# Patient Record
Sex: FEMALE | Race: WHITE | NOT HISPANIC OR LATINO | Employment: OTHER | URBAN - METROPOLITAN AREA
[De-identification: names, ages, dates, MRNs, and addresses within clinical notes are randomized per-mention and may not be internally consistent; named-entity substitution may affect disease eponyms.]

---

## 2018-02-07 ENCOUNTER — TELEPHONE (OUTPATIENT)
Dept: OBSTETRICS AND GYNECOLOGY | Facility: CLINIC | Age: 63
End: 2018-02-07

## 2018-02-07 DIAGNOSIS — R92.8 ABNORMAL MAMMOGRAM: Primary | ICD-10-CM

## 2018-02-07 NOTE — TELEPHONE ENCOUNTER
Tried to call patient to schedule her for the [facility] with Dr. Damon, left voicemail for patient to call back.

## 2018-02-07 NOTE — TELEPHONE ENCOUNTER
----- Message from Clara Puga MD sent at 2/7/2018 10:19 AM CST -----  Hi,     This patient is a former patient of Dr. Leigh and would like to see Dr. Damon at the [facility] for an annual exam. Please call to schedule.     Thanks!  Dr. Puga

## 2018-02-07 NOTE — TELEPHONE ENCOUNTER
Spoke with patient and notified her that annual exam is due 04/25/2018. Advised to call the office the beginning of April to schedule. She verbalized understanding

## 2018-02-07 NOTE — TELEPHONE ENCOUNTER
----- Message from Marielena Ramos MA sent at 2/7/2018  8:52 AM CST -----  Contact: patient      ----- Message -----  From: Rachid Sheridan  Sent: 2/6/2018   3:20 PM  To: Edd Elizabeth Staff    x_ 1st Request   _ 2nd Request   _ 3rd Request     Who: AKANKSHA MCINTYRE [[id number]]    Why: Former patient of Dr Bay is requesting a call back in reference to getting an U/S ordered at [facility].  Patient had a mammogram with findings and needs additional testing.  Patient did not want to schedule appointment in clinic.  Please call the patient and see how you can help her.    What Number to Call Back: [phone]    When to Expect a call back: (Before the end of the day)   -- if call after 3:00 call back will be tomorrow.

## 2018-02-15 ENCOUNTER — HOSPITAL ENCOUNTER (OUTPATIENT)
Dept: RADIOLOGY | Facility: HOSPITAL | Age: 63
Discharge: HOME | End: 2018-02-15
Attending: INTERNAL MEDICINE
Payer: COMMERCIAL

## 2018-02-15 VITALS — WEIGHT: 170 LBS | HEIGHT: 69 IN | BODY MASS INDEX: 25.18 KG/M2

## 2018-02-15 DIAGNOSIS — R92.8 ABNORMAL MAMMOGRAM: ICD-10-CM

## 2018-02-15 PROCEDURE — 76642 ULTRASOUND BREAST LIMITED: CPT | Mod: TC,PO,RT

## 2018-02-15 PROCEDURE — 76642 ULTRASOUND BREAST LIMITED: CPT | Mod: 26,RT,, | Performed by: RADIOLOGY

## 2018-03-20 ENCOUNTER — HOSPITAL ENCOUNTER (OUTPATIENT)
Dept: RADIOLOGY | Facility: HOSPITAL | Age: 63
Discharge: HOME | End: 2018-03-20
Payer: COMMERCIAL

## 2018-03-20 DIAGNOSIS — J40 BRONCHITIS, NOT SPECIFIED AS ACUTE OR CHRONIC: Primary | ICD-10-CM

## 2018-03-20 DIAGNOSIS — J40 BRONCHITIS, NOT SPECIFIED AS ACUTE OR CHRONIC: ICD-10-CM

## 2018-03-20 PROCEDURE — 71046 X-RAY EXAM CHEST 2 VIEWS: CPT | Mod: 26,,, | Performed by: RADIOLOGY

## 2018-03-20 PROCEDURE — 71046 X-RAY EXAM CHEST 2 VIEWS: CPT | Mod: TC

## 2018-05-08 ENCOUNTER — CLINICAL SUPPORT (OUTPATIENT)
Dept: SMOKING CESSATION | Facility: CLINIC | Age: 63
End: 2018-05-08
Payer: COMMERCIAL

## 2018-05-08 ENCOUNTER — TELEPHONE (OUTPATIENT)
Dept: SMOKING CESSATION | Facility: CLINIC | Age: 63
End: 2018-05-08

## 2018-05-08 VITALS
BODY MASS INDEX: 25.85 KG/M2 | DIASTOLIC BLOOD PRESSURE: 80 MMHG | HEART RATE: 89 BPM | WEIGHT: 175.06 LBS | SYSTOLIC BLOOD PRESSURE: 133 MMHG

## 2018-05-08 DIAGNOSIS — F17.210 NICOTINE DEPENDENCE, CIGARETTES, UNCOMPLICATED: Primary | ICD-10-CM

## 2018-05-08 PROCEDURE — 99404 PREV MED CNSL INDIV APPRX 60: CPT | Mod: S$GLB,,,

## 2018-05-08 RX ORDER — VARENICLINE TARTRATE 0.5 (11)-1
KIT ORAL
Qty: 1 PACKAGE | Refills: 0 | Status: SHIPPED | OUTPATIENT
Start: 2018-05-08 | End: 2018-07-23 | Stop reason: SINTOL

## 2018-05-08 RX ORDER — DM/P-EPHED/ACETAMINOPH/DOXYLAM 30-7.5/3
LIQUID (ML) ORAL
Qty: 168 LOZENGE | Refills: 0 | Status: SHIPPED | OUTPATIENT
Start: 2018-05-08 | End: 2019-03-11

## 2018-05-08 NOTE — PROGRESS NOTES
5/8/18    See Smoking Cessation Smart Form    Additional Interventions:  · Recommended patient participate in Smoking Cessation Group .  · Discussed triggers and planning for quit date.  · Given patient education handouts from [employer] Tool Kit #3  · Educated patient about and gave patient education handouts from  [employer] Drug Information on:  NRT, Wellbutrin, Chantix  · Provided phone number to reach Cessation Clinic CTTS (Certified Tobacco Treatment Specialist) for future assistance and numbers to 24/7 Quit lines.

## 2018-05-14 ENCOUNTER — CLINICAL SUPPORT (OUTPATIENT)
Dept: SMOKING CESSATION | Facility: CLINIC | Age: 63
End: 2018-05-14
Payer: COMMERCIAL

## 2018-05-14 DIAGNOSIS — F17.210 NICOTINE DEPENDENCE, CIGARETTES, UNCOMPLICATED: Primary | ICD-10-CM

## 2018-05-14 PROCEDURE — 90853 GROUP PSYCHOTHERAPY: CPT | Mod: S$GLB,,,

## 2018-05-14 NOTE — PROGRESS NOTES
Smoking Cessation Group Pre Group Session     Site: [facility]  Date:  5/14/18   Clinical Status of Patient: Outpatient   Length of Service and Code: 60 minutes - 80774   Number in Attendance: 3  Group Activities/Focus of Group:  Completion of TCRS (Tobacco Cessation Rating Scale) learned addiction model, personal reasons for quitting, medications, goals, quit date.  Specific session focus:  Personal views of necessary behaviors to get and stay quit, quit date planning, stages of readiness, behaviors and circumstances of previous quits, what seemed to work and what didnt, importance of assigning a planned quit date, mindfulness practices to assist with stress management and moving through urges without smoking.   Group Guidelines and orientation, withdrawal symptoms and side effects listed on the TCRS.   We viewed a [employer] news piece explaining mindfulness and the benefits.    Group practiced a mindful meditation on the breath and we processed this afterward.       Target symptoms:  withdrawal and medication side effects             The following were rated moderate (3) to severe (4) on TCRS:       Moderate 3: none     Severe 4:   none  Patient's Response to Intervention: Active participation, self-disclosure, supportive of group peers.  Progress Toward Goals and Other Mental Status Changes:  Patient shared smoking history and reasons for quitting with the group.  She has not received Chantix from [employer] yet.  Diagnosis:   F17.210  Plan: Group therapy, individual support/cessation counseling and medication monitoring by CTTS. Medication management by providers.  Return to Clinic: 1 week  Planned Quit Date:  Unknown

## 2018-06-13 ENCOUNTER — CLINICAL SUPPORT (OUTPATIENT)
Dept: SMOKING CESSATION | Facility: CLINIC | Age: 63
End: 2018-06-13
Payer: COMMERCIAL

## 2018-06-13 DIAGNOSIS — F17.210 NICOTINE DEPENDENCE, CIGARETTES, UNCOMPLICATED: Primary | ICD-10-CM

## 2018-06-13 PROCEDURE — 99407 BEHAV CHNG SMOKING > 10 MIN: CPT | Mod: S$GLB,,,

## 2018-06-20 ENCOUNTER — LAB VISIT (OUTPATIENT)
Dept: LAB | Facility: HOSPITAL | Age: 63
End: 2018-06-20
Attending: INTERNAL MEDICINE
Payer: COMMERCIAL

## 2018-06-20 ENCOUNTER — TELEPHONE (OUTPATIENT)
Dept: INTERNAL MEDICINE | Facility: CLINIC | Age: 63
End: 2018-06-20

## 2018-06-20 ENCOUNTER — OFFICE VISIT (OUTPATIENT)
Dept: INTERNAL MEDICINE | Facility: CLINIC | Age: 63
End: 2018-06-20
Payer: COMMERCIAL

## 2018-06-20 VITALS
HEART RATE: 87 BPM | TEMPERATURE: 98 F | BODY MASS INDEX: 25.87 KG/M2 | DIASTOLIC BLOOD PRESSURE: 70 MMHG | HEIGHT: 69 IN | WEIGHT: 174.69 LBS | SYSTOLIC BLOOD PRESSURE: 122 MMHG

## 2018-06-20 DIAGNOSIS — E78.5 HYPERLIPIDEMIA, UNSPECIFIED HYPERLIPIDEMIA TYPE: ICD-10-CM

## 2018-06-20 DIAGNOSIS — Z12.83 SKIN CANCER SCREENING: ICD-10-CM

## 2018-06-20 DIAGNOSIS — Z72.0 TOBACCO USE: ICD-10-CM

## 2018-06-20 DIAGNOSIS — Z12.11 COLON CANCER SCREENING: ICD-10-CM

## 2018-06-20 DIAGNOSIS — Z00.00 ANNUAL PHYSICAL EXAM: ICD-10-CM

## 2018-06-20 DIAGNOSIS — Z00.00 ANNUAL PHYSICAL EXAM: Primary | ICD-10-CM

## 2018-06-20 DIAGNOSIS — F41.9 ANXIETY: ICD-10-CM

## 2018-06-20 DIAGNOSIS — K21.9 GASTROESOPHAGEAL REFLUX DISEASE, ESOPHAGITIS PRESENCE NOT SPECIFIED: ICD-10-CM

## 2018-06-20 LAB
25(OH)D3+25(OH)D2 SERPL-MCNC: 40 NG/ML
ALBUMIN SERPL BCP-MCNC: 3.9 G/DL
ALP SERPL-CCNC: 53 U/L
ALT SERPL W/O P-5'-P-CCNC: 38 U/L
ANION GAP SERPL CALC-SCNC: 8 MMOL/L
AST SERPL-CCNC: 34 U/L
BASOPHILS # BLD AUTO: 0.02 K/UL
BASOPHILS NFR BLD: 0.3 %
BILIRUB SERPL-MCNC: 0.6 MG/DL
BUN SERPL-MCNC: 11 MG/DL
CALCIUM SERPL-MCNC: 9.5 MG/DL
CHLORIDE SERPL-SCNC: 103 MMOL/L
CHOLEST SERPL-MCNC: 214 MG/DL
CHOLEST/HDLC SERPL: 2.6 {RATIO}
CO2 SERPL-SCNC: 30 MMOL/L
CREAT SERPL-MCNC: 0.6 MG/DL
DIFFERENTIAL METHOD: ABNORMAL
EOSINOPHIL # BLD AUTO: 0.2 K/UL
EOSINOPHIL NFR BLD: 2.2 %
ERYTHROCYTE [DISTWIDTH] IN BLOOD BY AUTOMATED COUNT: 13.3 %
EST. GFR  (AFRICAN AMERICAN): >60 ML/MIN/1.73 M^2
EST. GFR  (NON AFRICAN AMERICAN): >60 ML/MIN/1.73 M^2
ESTIMATED AVG GLUCOSE: 100 MG/DL
GLUCOSE SERPL-MCNC: 116 MG/DL
HBA1C MFR BLD HPLC: 5.1 %
HCT VFR BLD AUTO: 45.1 %
HDLC SERPL-MCNC: 83 MG/DL
HDLC SERPL: 38.8 %
HGB BLD-MCNC: 15.1 G/DL
LDLC SERPL CALC-MCNC: 121.6 MG/DL
LYMPHOCYTES # BLD AUTO: 2.8 K/UL
LYMPHOCYTES NFR BLD: 41.2 %
MCH RBC QN AUTO: 31.8 PG
MCHC RBC AUTO-ENTMCNC: 33.5 G/DL
MCV RBC AUTO: 95 FL
MONOCYTES # BLD AUTO: 0.7 K/UL
MONOCYTES NFR BLD: 9.8 %
NEUTROPHILS # BLD AUTO: 3.2 K/UL
NEUTROPHILS NFR BLD: 46.4 %
NONHDLC SERPL-MCNC: 131 MG/DL
PLATELET # BLD AUTO: 260 K/UL
PMV BLD AUTO: 9.5 FL
POTASSIUM SERPL-SCNC: 4.2 MMOL/L
PROT SERPL-MCNC: 7.1 G/DL
RBC # BLD AUTO: 4.75 M/UL
SODIUM SERPL-SCNC: 141 MMOL/L
TRIGL SERPL-MCNC: 47 MG/DL
TSH SERPL DL<=0.005 MIU/L-ACNC: 0.95 UIU/ML
WBC # BLD AUTO: 6.85 K/UL

## 2018-06-20 PROCEDURE — 80053 COMPREHEN METABOLIC PANEL: CPT

## 2018-06-20 PROCEDURE — 99386 PREV VISIT NEW AGE 40-64: CPT | Mod: S$GLB,,, | Performed by: INTERNAL MEDICINE

## 2018-06-20 PROCEDURE — 82306 VITAMIN D 25 HYDROXY: CPT

## 2018-06-20 PROCEDURE — 84443 ASSAY THYROID STIM HORMONE: CPT

## 2018-06-20 PROCEDURE — 80061 LIPID PANEL: CPT

## 2018-06-20 PROCEDURE — 85025 COMPLETE CBC W/AUTO DIFF WBC: CPT

## 2018-06-20 PROCEDURE — 83036 HEMOGLOBIN GLYCOSYLATED A1C: CPT

## 2018-06-20 PROCEDURE — 36415 COLL VENOUS BLD VENIPUNCTURE: CPT

## 2018-06-20 PROCEDURE — 99999 PR PBB SHADOW E&M-EST. PATIENT-LVL III: CPT | Mod: PBBFAC,,, | Performed by: INTERNAL MEDICINE

## 2018-06-20 RX ORDER — ASCORBIC ACID 1000 MG
175 TABLET ORAL DAILY
COMMUNITY
End: 2023-03-14

## 2018-06-20 NOTE — TELEPHONE ENCOUNTER
Spoke with pt, advised of results. Pt is still taking pravastatin 80mg and fish oil daily. Pt would like to know if an order can be placed for a colonoscopy    Thanks

## 2018-06-21 ENCOUNTER — TELEPHONE (OUTPATIENT)
Dept: ENDOSCOPY | Facility: HOSPITAL | Age: 63
End: 2018-06-21

## 2018-06-21 NOTE — TELEPHONE ENCOUNTER
Contacted Pt to schedule colonoscopy,Pt not ready to schedule at this time, Pt to call back to schedule, number provided [phone].

## 2018-06-28 ENCOUNTER — CLINICAL SUPPORT (OUTPATIENT)
Dept: SMOKING CESSATION | Facility: CLINIC | Age: 63
End: 2018-06-28
Payer: COMMERCIAL

## 2018-06-28 DIAGNOSIS — F17.210 NICOTINE DEPENDENCE, CIGARETTES, UNCOMPLICATED: Primary | ICD-10-CM

## 2018-06-28 PROCEDURE — 99402 PREV MED CNSL INDIV APPRX 30: CPT | Mod: S$GLB,,,

## 2018-06-28 NOTE — PROGRESS NOTES
Individual Follow-Up Form    6/28/2018    Quit Date:   Has not quit yet.    Clinical Status of Patient: Outpatient    Length of Service: 30 minutes    Continuing Medication:   Chantix & nicotine lozenges    Target Symptoms: Withdrawal and medication side effects. The following were  rated moderate (3) to severe (4) on TCRS:  · Moderate (3): none  · Severe (4): none    Comments:   Patient is still smoking 1 pack of cigarettes/day.  She had to put things on hold for a while because of other pressing responsibilities.  She started the Chantix about 2 days ago.  No problems or side effects.  I reviewed with her how to take the Chantix correctly to avoid side effects and reminded her to call me if she has any problems. We talked at length about making a plan prior to her planned quit day so she could set up the process to be as successful as possible.  We went over the planned quit date checklist together.   We also talked about her supports and teaching them how to be supportive of her in a way that was helpful, ie not policing.  I gave her a handout she could share with her family about this.    Diagnosis: F17.210    Next Visit:   I will call patient in two weeks.  It is difficult for her to come in for a Clinic appointment because she is helping her mother-in-law settle in after a recent move and helps take care of her grandchildren.

## 2018-07-23 ENCOUNTER — CLINICAL SUPPORT (OUTPATIENT)
Dept: SMOKING CESSATION | Facility: CLINIC | Age: 63
End: 2018-07-23
Payer: COMMERCIAL

## 2018-07-23 DIAGNOSIS — F17.210 NICOTINE DEPENDENCE, CIGARETTES, UNCOMPLICATED: Primary | ICD-10-CM

## 2018-07-23 PROCEDURE — 99407 BEHAV CHNG SMOKING > 10 MIN: CPT | Mod: S$GLB,,,

## 2018-07-23 RX ORDER — IBUPROFEN 200 MG
1 TABLET ORAL DAILY
Qty: 28 PATCH | Refills: 0 | Status: SHIPPED | OUTPATIENT
Start: 2018-07-23 | End: 2019-03-11

## 2018-08-03 ENCOUNTER — CLINICAL SUPPORT (OUTPATIENT)
Dept: SMOKING CESSATION | Facility: CLINIC | Age: 63
End: 2018-08-03
Payer: COMMERCIAL

## 2018-08-03 DIAGNOSIS — F17.200 NICOTINE DEPENDENCE: Primary | ICD-10-CM

## 2018-08-03 PROCEDURE — 99407 BEHAV CHNG SMOKING > 10 MIN: CPT | Mod: S$GLB,,,

## 2018-08-03 NOTE — PROGRESS NOTES
Successful contact with patient regarding tobacco cessation quit #1. Pt states, she continue to work on quitting, she currently smoke about 10 cigarettes per day, and she is not ready to schedule a follow up appointment at this time.  Pt inform of her current benefit status, contact information to schedule an appointment when she is ready, and her 6 month telephone follow up. Tobacco cessation smart form completed for 3 month.

## 2018-08-14 ENCOUNTER — TELEPHONE (OUTPATIENT)
Dept: SMOKING CESSATION | Facility: CLINIC | Age: 63
End: 2018-08-14

## 2018-08-14 NOTE — TELEPHONE ENCOUNTER
8/14/18   10:15 a.m.      Telephone call to patient to follow up on progress quitting smoking.  Left voice mail #1 for return call.

## 2018-10-11 ENCOUNTER — INITIAL CONSULT (OUTPATIENT)
Dept: DERMATOLOGY | Facility: CLINIC | Age: 63
End: 2018-10-11
Payer: COMMERCIAL

## 2018-10-11 VITALS — BODY MASS INDEX: 25.7 KG/M2 | WEIGHT: 174 LBS

## 2018-10-11 DIAGNOSIS — L81.4 LENTIGINES: ICD-10-CM

## 2018-10-11 DIAGNOSIS — L30.1 DYSHIDROSIS: ICD-10-CM

## 2018-10-11 DIAGNOSIS — D22.9 NEVUS OF MULTIPLE SITES: ICD-10-CM

## 2018-10-11 DIAGNOSIS — L82.1 SEBORRHEIC KERATOSES: Primary | ICD-10-CM

## 2018-10-11 PROCEDURE — 99999 PR PBB SHADOW E&M-EST. PATIENT-LVL III: CPT | Mod: PBBFAC,,, | Performed by: DERMATOLOGY

## 2018-10-11 PROCEDURE — 3008F BODY MASS INDEX DOCD: CPT | Mod: CPTII,S$GLB,, | Performed by: DERMATOLOGY

## 2018-10-11 PROCEDURE — 99202 OFFICE O/P NEW SF 15 MIN: CPT | Mod: S$GLB,,, | Performed by: DERMATOLOGY

## 2018-10-11 NOTE — PROGRESS NOTES
Subjective:       Patient ID:  Karina Thomas is a 63 y.o. female who presents for   Chief Complaint   Patient presents with    Skin Discoloration     right back     Dry Skin     scalp    Skin Check     History of Present Illness: The patient presents with chief complaint of spots  Location: back  Duration: years  Signs/Symptoms: none    Prior treatments: none  Also rash on right foot for years previously treated per Dr [facility] tested not tinea, tried multiple topicals always recurs.         Review of Systems   Constitutional: Negative for fever.   Skin: Positive for rash. Negative for itching.   Hematologic/Lymphatic: Does not bruise/bleed easily.        Objective:    Physical Exam   Skin:   Areas Examined (abnormalities noted in diagram):   Scalp / Hair Palpated and Inspected  Head / Face Inspection Performed  Neck Inspection Performed  Chest / Axilla Inspection Performed  Back Inspection Performed  RUE Inspected  LUE Inspection Performed  RLE Inspected                       Diagram Legend     Erythematous scaling macule/papule c/w actinic keratosis       Vascular papule c/w angioma      Pigmented verrucoid papule/plaque c/w seborrheic keratosis      Yellow umbilicated papule c/w sebaceous hyperplasia      Irregularly shaped tan macule c/w lentigo     1-2 mm smooth white papules consistent with Milia      Movable subcutaneous cyst with punctum c/w epidermal inclusion cyst      Subcutaneous movable cyst c/w pilar cyst      Firm pink to brown papule c/w dermatofibroma      Pedunculated fleshy papule(s) c/w skin tag(s)      Evenly pigmented macule c/w junctional nevus     Mildly variegated pigmented, slightly irregular-bordered macule c/w mildly atypical nevus      Flesh colored to evenly pigmented papule c/w intradermal nevus       Pink pearly papule/plaque c/w basal cell carcinoma      Erythematous hyperkeratotic cursted plaque c/w SCC      Surgical scar with no sign of skin cancer recurrence      Open and  "closed comedones      Inflammatory papules and pustules      Verrucoid papule consistent consistent with wart     Erythematous eczematous patches and plaques     Dystrophic onycholytic nail with subungual debris c/w onychomycosis     Umbilicated papule    Erythematous-base heme-crusted tan verrucoid plaque consistent with inflamed seborrheic keratosis     Erythematous Silvery Scaling Plaque c/w Psoriasis     See annotation      Assessment / Plan:        Seborrheic keratoses  Brochure provided  reassurance      Dyshidrosis right foot  hibiclens weekly  Zeasorb powder  cerave psoriasis cream    Lentigines  The "ABCD" rules to observe pigmented lesions were reviewed.      Nevus of multiple sites  The "ABCD" rules to observe pigmented lesions were reviewed.               Follow-up in about 1 year (around 10/11/2019).  "

## 2018-10-11 NOTE — LETTER
October 11, 2018      Pinky Reyes MD  [address]  [facility] [address]           [place] [facility]  [address]  [place] [address]  Phone: [phone]  Fax: [phone]          Patient: Karina Thomas   MR Number: [id number]   YOB: 1955   Date of Visit: 10/11/2018       Dear Dr. Pinky Reyes:    Thank you for referring Karina Thomas to me for evaluation. Attached you will find relevant portions of my assessment and plan of care.    If you have questions, please do not hesitate to call me. I look forward to following Karina Thomas along with you.    Sincerely,    Wendy Shelley MD    Enclosure  CC:  No Recipients    If you would like to receive this communication electronically, please contact [email or web address] or [phone] to request more information on [email or web address] Link access.    For providers and/or their staff who would like to refer a patient to [facility], please contact us through our one-stop-shop provider referral line, [facility], at [phone].    If you feel you have received this communication in error or would no longer like to receive these types of communications, please e-mail [email or web address]

## 2018-11-05 ENCOUNTER — TELEPHONE (OUTPATIENT)
Dept: SMOKING CESSATION | Facility: CLINIC | Age: 63
End: 2018-11-05

## 2018-11-15 ENCOUNTER — OFFICE VISIT (OUTPATIENT)
Dept: URGENT CARE | Facility: CLINIC | Age: 63
End: 2018-11-15
Payer: COMMERCIAL

## 2018-11-15 VITALS
WEIGHT: 174 LBS | SYSTOLIC BLOOD PRESSURE: 131 MMHG | HEART RATE: 105 BPM | OXYGEN SATURATION: 96 % | DIASTOLIC BLOOD PRESSURE: 81 MMHG | TEMPERATURE: 97 F | HEIGHT: 69 IN | BODY MASS INDEX: 25.77 KG/M2

## 2018-11-15 DIAGNOSIS — J98.01 ACUTE BRONCHOSPASM: Primary | ICD-10-CM

## 2018-11-15 PROCEDURE — 99214 OFFICE O/P EST MOD 30 MIN: CPT | Mod: 25,S$GLB,, | Performed by: INTERNAL MEDICINE

## 2018-11-15 PROCEDURE — 94640 AIRWAY INHALATION TREATMENT: CPT | Mod: 59,S$GLB,, | Performed by: INTERNAL MEDICINE

## 2018-11-15 PROCEDURE — 3008F BODY MASS INDEX DOCD: CPT | Mod: CPTII,S$GLB,, | Performed by: INTERNAL MEDICINE

## 2018-11-15 PROCEDURE — 96372 THER/PROPH/DIAG INJ SC/IM: CPT | Mod: 59,S$GLB,, | Performed by: INTERNAL MEDICINE

## 2018-11-15 RX ORDER — METHYLPREDNISOLONE 4 MG/1
TABLET ORAL
Qty: 21 TABLET | Refills: 0 | Status: SHIPPED | OUTPATIENT
Start: 2018-11-16 | End: 2018-11-19

## 2018-11-15 RX ORDER — IPRATROPIUM BROMIDE 0.5 MG/2.5ML
0.5 SOLUTION RESPIRATORY (INHALATION)
Status: COMPLETED | OUTPATIENT
Start: 2018-11-15 | End: 2018-11-15

## 2018-11-15 RX ORDER — BETAMETHASONE SODIUM PHOSPHATE AND BETAMETHASONE ACETATE 3; 3 MG/ML; MG/ML
9 INJECTION, SUSPENSION INTRA-ARTICULAR; INTRALESIONAL; INTRAMUSCULAR; SOFT TISSUE ONCE
Status: COMPLETED | OUTPATIENT
Start: 2018-11-15 | End: 2018-11-15

## 2018-11-15 RX ORDER — ALBUTEROL SULFATE 0.83 MG/ML
2.5 SOLUTION RESPIRATORY (INHALATION)
Status: COMPLETED | OUTPATIENT
Start: 2018-11-15 | End: 2018-11-15

## 2018-11-15 RX ORDER — METHYLPREDNISOLONE 4 MG/1
TABLET ORAL
Qty: 21 TABLET | Refills: 0 | Status: SHIPPED | OUTPATIENT
Start: 2018-11-16 | End: 2018-11-15 | Stop reason: SDUPTHER

## 2018-11-15 RX ADMIN — BETAMETHASONE SODIUM PHOSPHATE AND BETAMETHASONE ACETATE 9 MG: 3; 3 INJECTION, SUSPENSION INTRA-ARTICULAR; INTRALESIONAL; INTRAMUSCULAR; SOFT TISSUE at 01:11

## 2018-11-15 RX ADMIN — IPRATROPIUM BROMIDE 0.5 MG: 0.5 SOLUTION RESPIRATORY (INHALATION) at 01:11

## 2018-11-15 RX ADMIN — ALBUTEROL SULFATE 2.5 MG: 0.83 SOLUTION RESPIRATORY (INHALATION) at 01:11

## 2018-11-15 NOTE — PROGRESS NOTES
"Subjective:       Patient ID: Karina Thomas is a 63 y.o. female.    Vitals:  height is 5' 9" (1.753 m) and weight is 78.9 kg (174 lb). Her oral temperature is 97.2 °F (36.2 °C). Her blood pressure is 131/81 and her pulse is 105. Her oxygen saturation is 96%.     Chief Complaint: URI    URI    This is a new problem. Episode onset: Sunday. The problem has been gradually worsening. There has been no fever. Associated symptoms include congestion, coughing, rhinorrhea and sneezing. Pertinent negatives include no ear pain, nausea, rash, sinus pain, vomiting or wheezing. She has tried decongestant for the symptoms. The treatment provided mild relief.       Constitution: Positive for chills and fatigue. Negative for sweating and fever.   HENT: Positive for congestion. Negative for ear pain, sinus pain, sinus pressure and voice change.    Neck: Negative for painful lymph nodes.   Eyes: Negative for eye redness.   Respiratory: Positive for chest tightness, cough and sputum production. Negative for bloody sputum, COPD, shortness of breath, stridor, wheezing and asthma.    Gastrointestinal: Negative for nausea and vomiting.   Musculoskeletal: Negative for muscle ache.   Skin: Negative for rash.   Allergic/Immunologic: Positive for sneezing. Negative for seasonal allergies and asthma.   Hematologic/Lymphatic: Negative for swollen lymph nodes.       Objective:      Physical Exam    Assessment:       1. Acute bronchospasm        Plan:         Acute bronchospasm  -     betamethasone acetate-betamethasone sodium phosphate injection 9 mg  -     ipratropium 0.02 % nebulizer solution 0.5 mg  -     albuterol nebulizer solution 2.5 mg    Other orders  -     Discontinue: methylPREDNISolone (MEDROL DOSEPACK) 4 mg tablet; use as directed  Dispense: 21 tablet; Refill: 0  -     methylPREDNISolone (MEDROL DOSEPACK) 4 mg tablet; use as directed  Dispense: 21 tablet; Refill: 0         "

## 2018-11-19 ENCOUNTER — OFFICE VISIT (OUTPATIENT)
Dept: URGENT CARE | Facility: CLINIC | Age: 63
End: 2018-11-19
Payer: COMMERCIAL

## 2018-11-19 VITALS
TEMPERATURE: 97 F | DIASTOLIC BLOOD PRESSURE: 83 MMHG | RESPIRATION RATE: 18 BRPM | HEIGHT: 69 IN | BODY MASS INDEX: 25.77 KG/M2 | WEIGHT: 174 LBS | HEART RATE: 87 BPM | OXYGEN SATURATION: 97 % | SYSTOLIC BLOOD PRESSURE: 136 MMHG

## 2018-11-19 DIAGNOSIS — F17.200 SMOKER: ICD-10-CM

## 2018-11-19 DIAGNOSIS — R09.1 PLEURISY: ICD-10-CM

## 2018-11-19 DIAGNOSIS — R05.9 COUGH: Primary | ICD-10-CM

## 2018-11-19 DIAGNOSIS — J98.01 ACUTE BRONCHOSPASM: ICD-10-CM

## 2018-11-19 DIAGNOSIS — J20.9 ACUTE PURULENT BRONCHITIS: ICD-10-CM

## 2018-11-19 PROCEDURE — 3008F BODY MASS INDEX DOCD: CPT | Mod: CPTII,S$GLB,, | Performed by: INTERNAL MEDICINE

## 2018-11-19 PROCEDURE — 71046 X-RAY EXAM CHEST 2 VIEWS: CPT | Mod: FY,S$GLB,, | Performed by: RADIOLOGY

## 2018-11-19 PROCEDURE — 99214 OFFICE O/P EST MOD 30 MIN: CPT | Mod: S$GLB,,, | Performed by: INTERNAL MEDICINE

## 2018-11-19 RX ORDER — ALBUTEROL SULFATE 90 UG/1
2 AEROSOL, METERED RESPIRATORY (INHALATION) EVERY 4 HOURS PRN
Qty: 1 INHALER | Refills: 1 | Status: SHIPPED | OUTPATIENT
Start: 2018-11-19 | End: 2018-11-19

## 2018-11-19 RX ORDER — LEVOFLOXACIN 500 MG/1
500 TABLET, FILM COATED ORAL DAILY
Qty: 10 TABLET | Refills: 0 | Status: SHIPPED | OUTPATIENT
Start: 2018-11-19 | End: 2018-11-29

## 2018-11-19 RX ORDER — METHYLPREDNISOLONE 4 MG/1
TABLET ORAL
Qty: 1 PACKAGE | Refills: 0 | Status: SHIPPED | OUTPATIENT
Start: 2018-11-19 | End: 2019-03-11

## 2018-11-19 RX ORDER — LEVOFLOXACIN 500 MG/1
500 TABLET, FILM COATED ORAL DAILY
Qty: 10 TABLET | Refills: 0 | Status: SHIPPED | OUTPATIENT
Start: 2018-11-19 | End: 2018-11-19

## 2018-11-19 RX ORDER — ALBUTEROL SULFATE 90 UG/1
2 AEROSOL, METERED RESPIRATORY (INHALATION) EVERY 4 HOURS PRN
Qty: 1 INHALER | Refills: 1 | Status: SHIPPED | OUTPATIENT
Start: 2018-11-19 | End: 2019-03-11 | Stop reason: ALTCHOICE

## 2018-11-19 RX ORDER — METHYLPREDNISOLONE 4 MG/1
TABLET ORAL
Qty: 1 PACKAGE | Refills: 0 | Status: SHIPPED | OUTPATIENT
Start: 2018-11-19 | End: 2018-11-19

## 2018-11-19 RX ORDER — CODEINE PHOSPHATE AND GUAIFENESIN 10; 100 MG/5ML; MG/5ML
5 SOLUTION ORAL EVERY 4 HOURS PRN
Qty: 118 ML | Refills: 0 | Status: SHIPPED | OUTPATIENT
Start: 2018-11-19 | End: 2018-11-29

## 2018-11-19 NOTE — PROGRESS NOTES
"Subjective:       Patient ID: Karina Thomas is a 63 y.o. female.    Vitals:  height is 5' 9" (1.753 m) and weight is 78.9 kg (174 lb). Her temperature is 97.1 °F (36.2 °C). Her blood pressure is 136/83 and her pulse is 87. Her respiration is 18 and oxygen saturation is 97%.     Chief Complaint: URI    Pt seen here on Thursday, thinks she is getting better but thinks may there is more that can be done      URI    This is a new problem. The current episode started in the past 7 days. The problem has been unchanged. Associated symptoms include coughing and wheezing. Pertinent negatives include no congestion, ear pain, nausea, rash, sinus pain or vomiting.       Constitution: Negative for chills, sweating, fatigue and fever.   HENT: Negative for ear pain, congestion, sinus pain, sinus pressure and voice change.    Neck: Negative for painful lymph nodes.   Eyes: Negative for eye redness.   Respiratory: Positive for cough and wheezing. Negative for chest tightness, sputum production, bloody sputum, COPD, shortness of breath, stridor and asthma.    Gastrointestinal: Negative for nausea and vomiting.   Musculoskeletal: Negative for muscle ache.   Skin: Negative for rash.   Allergic/Immunologic: Negative for seasonal allergies and asthma.   Hematologic/Lymphatic: Negative for swollen lymph nodes.       Objective:      Physical Exam   Constitutional: She appears well-developed and well-nourished.   HENT:   Head: Normocephalic and atraumatic.   Eyes: Conjunctivae and EOM are normal. Pupils are equal, round, and reactive to light.   Neck: Normal range of motion. Neck supple.   Cardiovascular: Normal rate and regular rhythm.   Pulmonary/Chest: She has wheezes.   Diffuse ronchi and upper airway congestion   Nursing note and vitals reviewed.      Assessment:       1. Cough    2. Pleurisy    3. Smoker    4. Acute bronchospasm    5. Acute purulent bronchitis        Plan:         Cough  -     XR CHEST PA AND LATERAL; Future; Expected " date: 11/19/2018    Pleurisy  -     XR CHEST PA AND LATERAL; Future; Expected date: 11/19/2018    Smoker  -     Ambulatory referral to Smoking Cessation Program    Acute bronchospasm  -     Discontinue: methylPREDNISolone (MEDROL DOSEPACK) 4 mg tablet; use as directed  Dispense: 1 Package; Refill: 0  -     Discontinue: albuterol (PROVENTIL/VENTOLIN HFA) 90 mcg/actuation inhaler; Inhale 2 puffs into the lungs every 4 (four) hours as needed for Wheezing. Rescue  Dispense: 1 Inhaler; Refill: 1  -     Discontinue: levoFLOXacin (LEVAQUIN) 500 MG tablet; Take 1 tablet (500 mg total) by mouth once daily. for 10 days  Dispense: 10 tablet; Refill: 0    Acute purulent bronchitis  -     Discontinue: methylPREDNISolone (MEDROL DOSEPACK) 4 mg tablet; use as directed  Dispense: 1 Package; Refill: 0  -     Discontinue: albuterol (PROVENTIL/VENTOLIN HFA) 90 mcg/actuation inhaler; Inhale 2 puffs into the lungs every 4 (four) hours as needed for Wheezing. Rescue  Dispense: 1 Inhaler; Refill: 1  -     Discontinue: levoFLOXacin (LEVAQUIN) 500 MG tablet; Take 1 tablet (500 mg total) by mouth once daily. for 10 days  Dispense: 10 tablet; Refill: 0    Other orders  -     albuterol (PROVENTIL/VENTOLIN HFA) 90 mcg/actuation inhaler; Inhale 2 puffs into the lungs every 4 (four) hours as needed for Wheezing. Rescue  Dispense: 1 Inhaler; Refill: 1  -     methylPREDNISolone (MEDROL DOSEPACK) 4 mg tablet; use as directed  Dispense: 1 Package; Refill: 0  -     levoFLOXacin (LEVAQUIN) 500 MG tablet; Take 1 tablet (500 mg total) by mouth once daily. for 10 days  Dispense: 10 tablet; Refill: 0  -     guaifenesin-codeine 100-10 mg/5 ml (CHERATUSSIN AC)  mg/5 mL syrup; Take 5 mLs by mouth every 4 (four) hours as needed for Cough.  Dispense: 118 mL; Refill: 0

## 2018-11-20 ENCOUNTER — TELEPHONE (OUTPATIENT)
Dept: SMOKING CESSATION | Facility: CLINIC | Age: 63
End: 2018-11-20

## 2018-11-30 RX ORDER — LORAZEPAM 0.5 MG/1
0.5 TABLET ORAL DAILY PRN
Qty: 30 TABLET | Refills: 1 | Status: SHIPPED | OUTPATIENT
Start: 2018-11-30 | End: 2019-05-08 | Stop reason: SDUPTHER

## 2018-11-30 RX ORDER — PRAVASTATIN SODIUM 80 MG/1
80 TABLET ORAL DAILY
Qty: 90 TABLET | Refills: 3 | Status: SHIPPED | OUTPATIENT
Start: 2018-11-30 | End: 2019-11-18 | Stop reason: SDUPTHER

## 2018-11-30 NOTE — TELEPHONE ENCOUNTER
----- Message from Jaswinder Mares sent at 11/30/2018 11:35 AM CST -----  Contact: [facility]/[phone]  Type: Rx    Name of medication(s): pravastatin (PRAVACHOL) 80 MG tablet,lorazepam (ATIVAN) 0.5 MG tablet    Is this a refill? New rx? New     Who prescribed medication? NA    Pharmacy Name, Phone, & Location:[facility] - [address]    Comments: Please advise,        Thanks

## 2019-02-04 RX ORDER — ESOMEPRAZOLE MAGNESIUM 40 MG/1
40 CAPSULE, DELAYED RELEASE ORAL DAILY
Qty: 90 CAPSULE | Refills: 0 | Status: SHIPPED | OUTPATIENT
Start: 2019-02-04 | End: 2019-03-12 | Stop reason: SDUPTHER

## 2019-02-07 ENCOUNTER — TELEPHONE (OUTPATIENT)
Dept: INTERNAL MEDICINE | Facility: CLINIC | Age: 64
End: 2019-02-07

## 2019-02-07 DIAGNOSIS — Z12.31 ENCOUNTER FOR SCREENING MAMMOGRAM FOR BREAST CANCER: Primary | ICD-10-CM

## 2019-02-07 NOTE — TELEPHONE ENCOUNTER
----- Message from Wyatt Moser sent at 2/7/2019 11:41 AM CST -----  Contact: Patient [phone] Cell  Type: Orders Request    What orders/ testing are being requested? Mammo Gram    Is there a future appointment scheduled for the patient with PCP? No    When?    Comments: last Mammo Gram 02/15 , would like to have test done at [facility], Patient [phone] Cell    Please call an advise  Thank you

## 2019-02-19 ENCOUNTER — HOSPITAL ENCOUNTER (OUTPATIENT)
Dept: RADIOLOGY | Facility: HOSPITAL | Age: 64
Discharge: HOME | End: 2019-02-19
Attending: INTERNAL MEDICINE
Payer: COMMERCIAL

## 2019-02-19 DIAGNOSIS — Z12.31 ENCOUNTER FOR SCREENING MAMMOGRAM FOR BREAST CANCER: ICD-10-CM

## 2019-02-19 PROCEDURE — 77067 MAMMO DIGITAL SCREENING BILAT WITH TOMOSYNTHESIS_CAD: ICD-10-PCS | Mod: 26,,, | Performed by: RADIOLOGY

## 2019-02-19 PROCEDURE — 77067 SCR MAMMO BI INCL CAD: CPT | Mod: 26,,, | Performed by: RADIOLOGY

## 2019-02-19 PROCEDURE — 77063 MAMMO DIGITAL SCREENING BILAT WITH TOMOSYNTHESIS_CAD: ICD-10-PCS | Mod: 26,,, | Performed by: RADIOLOGY

## 2019-02-19 PROCEDURE — 77063 BREAST TOMOSYNTHESIS BI: CPT | Mod: 26,,, | Performed by: RADIOLOGY

## 2019-02-19 PROCEDURE — 77067 SCR MAMMO BI INCL CAD: CPT | Mod: TC

## 2019-03-08 ENCOUNTER — TELEPHONE (OUTPATIENT)
Dept: INTERNAL MEDICINE | Facility: CLINIC | Age: 64
End: 2019-03-08

## 2019-03-08 DIAGNOSIS — Z00.00 ANNUAL PHYSICAL EXAM: Primary | ICD-10-CM

## 2019-03-08 NOTE — TELEPHONE ENCOUNTER
----- Message from Wyatt Moser sent at 3/8/2019 10:21 AM CST -----  Contact: Patient   7/09/19 Annual Physical need lab orders placed and linked    Thank you

## 2019-03-11 ENCOUNTER — OFFICE VISIT (OUTPATIENT)
Dept: URGENT CARE | Facility: CLINIC | Age: 64
End: 2019-03-11
Payer: COMMERCIAL

## 2019-03-11 VITALS
OXYGEN SATURATION: 97 % | TEMPERATURE: 98 F | HEIGHT: 69 IN | WEIGHT: 174 LBS | HEART RATE: 75 BPM | SYSTOLIC BLOOD PRESSURE: 150 MMHG | BODY MASS INDEX: 25.77 KG/M2 | RESPIRATION RATE: 16 BRPM | DIASTOLIC BLOOD PRESSURE: 91 MMHG

## 2019-03-11 DIAGNOSIS — J06.9 VIRAL URI WITH COUGH: Primary | ICD-10-CM

## 2019-03-11 DIAGNOSIS — F17.200 SMOKER: ICD-10-CM

## 2019-03-11 DIAGNOSIS — R52 BODY ACHES: ICD-10-CM

## 2019-03-11 LAB
CTP QC/QA: YES
FLUAV AG NPH QL: NEGATIVE
FLUBV AG NPH QL: NEGATIVE

## 2019-03-11 PROCEDURE — 3008F BODY MASS INDEX DOCD: CPT | Mod: CPTII,S$GLB,, | Performed by: NURSE PRACTITIONER

## 2019-03-11 PROCEDURE — 99214 PR OFFICE/OUTPT VISIT, EST, LEVL IV, 30-39 MIN: ICD-10-PCS | Mod: S$GLB,,, | Performed by: NURSE PRACTITIONER

## 2019-03-11 PROCEDURE — 87804 POCT INFLUENZA A/B: ICD-10-PCS | Mod: 59,QW,S$GLB, | Performed by: NURSE PRACTITIONER

## 2019-03-11 PROCEDURE — 3008F PR BODY MASS INDEX (BMI) DOCUMENTED: ICD-10-PCS | Mod: CPTII,S$GLB,, | Performed by: NURSE PRACTITIONER

## 2019-03-11 PROCEDURE — 99214 OFFICE O/P EST MOD 30 MIN: CPT | Mod: S$GLB,,, | Performed by: NURSE PRACTITIONER

## 2019-03-11 PROCEDURE — 87804 INFLUENZA ASSAY W/OPTIC: CPT | Mod: QW,S$GLB,, | Performed by: NURSE PRACTITIONER

## 2019-03-11 NOTE — PROGRESS NOTES
"Subjective:       Patient ID: Karina Thomas is a 63 y.o. female.    Vitals:  height is 5' 9" (1.753 m) and weight is 78.9 kg (174 lb). Her oral temperature is 98.4 °F (36.9 °C). Her blood pressure is 150/91 (abnormal) and her pulse is 75. Her respiration is 16 and oxygen saturation is 97%.     Chief Complaint: Generalized Body Aches    Denies exposure to the flu. Did not receive flu vaccine this year.      Cough   This is a new problem. Episode onset: 2 days. The problem has been gradually worsening. The problem occurs hourly. Associated symptoms include chills, ear congestion, myalgias and postnasal drip. Pertinent negatives include no ear pain, eye redness, fever, hemoptysis, rash, sore throat, shortness of breath or wheezing. She has tried nothing for the symptoms. The treatment provided no relief.       Constitution: Positive for chills. Negative for sweating, fatigue and fever.   HENT: Positive for postnasal drip. Negative for ear pain, congestion, sinus pain, sore throat and voice change.    Neck: Negative for painful lymph nodes.   Eyes: Negative for eye redness.   Respiratory: Positive for cough. Negative for chest tightness, sputum production, bloody sputum, COPD, shortness of breath, stridor, wheezing and asthma.    Gastrointestinal: Negative for nausea and vomiting.   Musculoskeletal: Positive for muscle ache.   Skin: Negative for rash.   Allergic/Immunologic: Negative for seasonal allergies and asthma.   Hematologic/Lymphatic: Negative for swollen lymph nodes.       Objective:      Physical Exam   Constitutional: She is oriented to person, place, and time. She appears well-developed and well-nourished. She is cooperative.  Non-toxic appearance. She does not appear ill. No distress.   HENT:   Head: Normocephalic and atraumatic.   Right Ear: Hearing, tympanic membrane, external ear and ear canal normal.   Left Ear: Hearing, tympanic membrane, external ear and ear canal normal.   Nose: Nose normal. No " mucosal edema, rhinorrhea or nasal deformity. No epistaxis. Right sinus exhibits no maxillary sinus tenderness and no frontal sinus tenderness. Left sinus exhibits no maxillary sinus tenderness and no frontal sinus tenderness.   Mouth/Throat: Uvula is midline and mucous membranes are normal. No trismus in the jaw. Normal dentition. No uvula swelling. Posterior oropharyngeal erythema (MILD) present.   Eyes: Conjunctivae and lids are normal. No scleral icterus.   Sclera clear bilat   Neck: Trachea normal, full passive range of motion without pain and phonation normal. Neck supple.   Cardiovascular: Normal rate, regular rhythm, normal heart sounds, intact distal pulses and normal pulses.   Pulmonary/Chest: Effort normal and breath sounds normal. No respiratory distress.   Abdominal: Soft. Normal appearance and bowel sounds are normal. She exhibits no distension. There is no tenderness.   Musculoskeletal: Normal range of motion. She exhibits no edema or deformity.   Neurological: She is alert and oriented to person, place, and time. She exhibits normal muscle tone. Coordination normal.   Skin: Skin is warm, dry and intact. She is not diaphoretic. No pallor.   Psychiatric: She has a normal mood and affect. Her speech is normal and behavior is normal. Judgment and thought content normal. Cognition and memory are normal.   Nursing note and vitals reviewed.      Results for orders placed or performed in visit on 03/11/19   POCT Influenza A/B   Result Value Ref Range    Rapid Influenza A Ag Negative Negative    Rapid Influenza B Ag Negative Negative     Acceptable Yes      Assessment:       1. Viral URI with cough    2. Body aches        Plan:         Viral URI with cough    Body aches  -     POCT Influenza A/B            Patient Instructions     FOLLOW-UP IF YOUR SYMPTOMS WORSEN OR DO NOT IMPROVE.    You must understand that you've received an Urgent Care treatment only and that you may be released before all  your medical problems are known or treated. You, the patient, will arrange for follow up care as instructed.  If your condition worsens we recommend that you receive another evaluation at the emergency room immediately or contact your primary medical clinics after hours call service to discuss your concerns.  Please return here or go to the Emergency Department for any concerns or worsening of condition.      Viral Upper Respiratory Illness (Adult)  You have a viral upper respiratory illness (URI), which is another term for the common cold. This illness is contagious during the first few days. It is spread through the air by coughing and sneezing. It may also be spread by direct contact (touching the sick person and then touching your own eyes, nose, or mouth). Frequent handwashing will decrease risk of spread. Most viral illnesses go away within 7 to 10 days with rest and simple home remedies. Sometimes the illness may last for several weeks. Antibiotics will not kill a virus, and they are generally not prescribed for this condition.    Home care  · If symptoms are severe, rest at home for the first 2 to 3 days. When you resume activity, don't let yourself get too tired.  · Avoid being exposed to cigarette smoke (yours or others).  · You may use acetaminophen or ibuprofen to control pain and fever, unless another medicine was prescribed. (Note: If you have chronic liver or kidney disease, have ever had a stomach ulcer or gastrointestinal bleeding, or are taking blood-thinning medicines, talk with your healthcare provider before using these medicines.) Aspirin should never be given to anyone under 18 years of age who is ill with a viral infection or fever. It may cause severe liver or brain damage.  · Your appetite may be poor, so a light diet is fine. Avoid dehydration by drinking 6 to 8 glasses of fluids per day (water, soft drinks, juices, tea, or soup). Extra fluids will help loosen secretions in the nose and  lungs.  · Over-the-counter cold medicines will not shorten the length of time youre sick, but they may be helpful for the following symptoms: cough, sore throat, and nasal and sinus congestion. (Note: Do not use decongestants if you have high blood pressure.)  Follow-up care  Follow up with your healthcare provider, or as advised.  When to seek medical advice  Call your healthcare provider right away if any of these occur:  · Cough with lots of colored sputum (mucus)  · Severe headache; face, neck, or ear pain  · Difficulty swallowing due to throat pain  · Fever of 100.4°F (38°C)  Call 911, or get immediate medical care  Call emergency services right away if any of these occur:  · Chest pain, shortness of breath, wheezing, or difficulty breathing  · Coughing up blood  · Inability to swallow due to throat pain  Date Last Reviewed: 9/13/2015  © 6436-5755 [employer]. [address] [place] [address], [place] [address]. All rights reserved. This information is not intended as a substitute for professional medical care. Always follow your healthcare professional's instructions.

## 2019-03-11 NOTE — PATIENT INSTRUCTIONS
FOLLOW-UP IF YOUR SYMPTOMS WORSEN OR DO NOT IMPROVE.    You must understand that you've received an Urgent Care treatment only and that you may be released before all your medical problems are known or treated. You, the patient, will arrange for follow up care as instructed.  If your condition worsens we recommend that you receive another evaluation at the emergency room immediately or contact your primary medical clinics after hours call service to discuss your concerns.  Please return here or go to the Emergency Department for any concerns or worsening of condition.      Viral Upper Respiratory Illness (Adult)  You have a viral upper respiratory illness (URI), which is another term for the common cold. This illness is contagious during the first few days. It is spread through the air by coughing and sneezing. It may also be spread by direct contact (touching the sick person and then touching your own eyes, nose, or mouth). Frequent handwashing will decrease risk of spread. Most viral illnesses go away within 7 to 10 days with rest and simple home remedies. Sometimes the illness may last for several weeks. Antibiotics will not kill a virus, and they are generally not prescribed for this condition.    Home care  · If symptoms are severe, rest at home for the first 2 to 3 days. When you resume activity, don't let yourself get too tired.  · Avoid being exposed to cigarette smoke (yours or others).  · You may use acetaminophen or ibuprofen to control pain and fever, unless another medicine was prescribed. (Note: If you have chronic liver or kidney disease, have ever had a stomach ulcer or gastrointestinal bleeding, or are taking blood-thinning medicines, talk with your healthcare provider before using these medicines.) Aspirin should never be given to anyone under 18 years of age who is ill with a viral infection or fever. It may cause severe liver or brain damage.  · Your appetite may be poor, so a light diet is  fine. Avoid dehydration by drinking 6 to 8 glasses of fluids per day (water, soft drinks, juices, tea, or soup). Extra fluids will help loosen secretions in the nose and lungs.  · Over-the-counter cold medicines will not shorten the length of time youre sick, but they may be helpful for the following symptoms: cough, sore throat, and nasal and sinus congestion. (Note: Do not use decongestants if you have high blood pressure.)  Follow-up care  Follow up with your healthcare provider, or as advised.  When to seek medical advice  Call your healthcare provider right away if any of these occur:  · Cough with lots of colored sputum (mucus)  · Severe headache; face, neck, or ear pain  · Difficulty swallowing due to throat pain  · Fever of 100.4°F (38°C)  Call 911, or get immediate medical care  Call emergency services right away if any of these occur:  · Chest pain, shortness of breath, wheezing, or difficulty breathing  · Coughing up blood  · Inability to swallow due to throat pain  Date Last Reviewed: 9/13/2015 © 2000-2017 [employer]. [address] [place] [address]. All rights reserved. This information is not intended as a substitute for professional medical care. Always follow your healthcare professional's instructions.

## 2019-03-12 RX ORDER — ESOMEPRAZOLE MAGNESIUM 40 MG/1
CAPSULE, DELAYED RELEASE ORAL
Qty: 90 CAPSULE | Refills: 3 | Status: SHIPPED | OUTPATIENT
Start: 2019-03-12 | End: 2020-04-03

## 2019-03-13 DIAGNOSIS — E78.5 HYPERLIPIDEMIA, UNSPECIFIED HYPERLIPIDEMIA TYPE: Primary | ICD-10-CM

## 2019-03-13 RX ORDER — OMEGA-3-ACID ETHYL ESTERS 1 G/1
2 CAPSULE, LIQUID FILLED ORAL 2 TIMES DAILY
Qty: 360 CAPSULE | Refills: 3 | Status: SHIPPED | OUTPATIENT
Start: 2019-03-13 | End: 2023-03-14

## 2019-03-13 NOTE — TELEPHONE ENCOUNTER
----- Message from Demetrice Espinal sent at 3/13/2019  8:01 AM CDT -----  Contact: [facility] [phone]  Prescription Request:     Name of medication: omega-3 acid ethyl esters (LOVAZA) 1 gram capsule    Reason for request: Refill    Pharmacy: [facility] - [address]    Please advise.    Thank You

## 2019-05-07 ENCOUNTER — OFFICE VISIT (OUTPATIENT)
Dept: URGENT CARE | Facility: CLINIC | Age: 64
End: 2019-05-07
Payer: COMMERCIAL

## 2019-05-07 VITALS
WEIGHT: 175 LBS | TEMPERATURE: 98 F | OXYGEN SATURATION: 98 % | HEART RATE: 82 BPM | DIASTOLIC BLOOD PRESSURE: 85 MMHG | HEIGHT: 69 IN | SYSTOLIC BLOOD PRESSURE: 134 MMHG | BODY MASS INDEX: 25.92 KG/M2

## 2019-05-07 DIAGNOSIS — M70.22 OLECRANON BURSITIS OF LEFT ELBOW: Primary | ICD-10-CM

## 2019-05-07 PROCEDURE — 99214 PR OFFICE/OUTPT VISIT, EST, LEVL IV, 30-39 MIN: ICD-10-PCS | Mod: 25,S$GLB,, | Performed by: FAMILY MEDICINE

## 2019-05-07 PROCEDURE — 99214 OFFICE O/P EST MOD 30 MIN: CPT | Mod: 25,S$GLB,, | Performed by: FAMILY MEDICINE

## 2019-05-07 PROCEDURE — 20600 PR DRAIN/INJECT SMALL JOINT/BURSA: ICD-10-PCS | Mod: S$GLB,,, | Performed by: FAMILY MEDICINE

## 2019-05-07 PROCEDURE — 20600 DRAIN/INJ JOINT/BURSA W/O US: CPT | Mod: S$GLB,,, | Performed by: FAMILY MEDICINE

## 2019-05-07 RX ORDER — DICLOFENAC SODIUM 75 MG/1
75 TABLET, DELAYED RELEASE ORAL 2 TIMES DAILY
Qty: 30 TABLET | Refills: 0 | Status: SHIPPED | OUTPATIENT
Start: 2019-05-07 | End: 2019-05-22

## 2019-05-07 NOTE — PROCEDURES
Procedures   The left elbow bursitis was sterile prepped with alcohol.  4 ml of lidocaine 1% without epi use for local anesthesia.  18 gauge needle with a 30 ml syringes use to aspirate the bursitis.  15 ml of clear red fluid drained.  1 ml of celestone use to inject into the bursitis.  Clean dressing applied.  Pt tolerated the procedure well.

## 2019-05-07 NOTE — PATIENT INSTRUCTIONS
Bursitis  You have bursitis. This is an inflammation of the bursa. These are small, fluid-filled sacs that surround the larger joints of the body. The bursa help the muscles and tendons move smoothly over the joints.  Bursitis often happens in the shoulder. But it can also affect the elbows, hips, pelvis, knees, toes, and heels. Bursitis can be caused by injury, overuse of the joint, or infection of the bursa. Symptoms include pain and tenderness over a joint. Symptoms get worse with movement.  Bursitis is treated with an anti-inflammatory medicine and by resting the joint. More severe cases require injection of medicine directly into the bursa.    Home care  · Rest the painful joint and protect it from movement. This will allow the inflammation to heal faster.  · Apply an ice pack over the injured area for no more than 15 to 20 minutes. Do this every 3 to 6 hours for the first 24 to 48 hours. Keep using ice packs 3 to 4 times a day until the pain and swelling improves.   · To make an ice pack, put ice cubes in a sealed plastic zip-lock bag. Wrap the bag in a clean, thin towel or cloth. Never put ice or an ice pack directly on the skin. As the ice melts, be careful to avoid getting any wrap or splint wet.  · You may take over-the-counter pain medicine to treat pain and inflammation, unless another medicine was prescribed. Anti-inflammatory pain medicines may be more effective. Talk with your provider beforeusing these medicines if you have chronic liver or kidney disease, or ever had a stomach ulcer or GI (gastrointestinal) bleeding.  · As your symptoms improve, slowly begin to move the joint. Do not overuse the joint. This may cause the symptoms to flare up again.  When to seek medical advice  Call your healthcare provider right away if any of these occur:  · Redness over the painful area  · Increasing pain or swelling at the joint  · Fever of 100.4°F (38°C) or above lasting for 24 to 48 hours  Date Last  Reviewed: 11/21/2015  © 0790-9691 [employer]. [address] [place] [address], [place] [address]. All rights reserved. This information is not intended as a substitute for professional medical care. Always follow your healthcare professional's instructions.      Follow up with your doctor in a few days as needed.  Return to the urgent care or go to the ER if symptoms get worse.    Jaswinder Rodriguez MD

## 2019-05-07 NOTE — PROGRESS NOTES
"Subjective:       Patient ID: Karina Thomas is a 64 y.o. female.    Vitals:  height is 5' 9" (1.753 m) and weight is 79.4 kg (175 lb). Her oral temperature is 97.8 °F (36.6 °C). Her blood pressure is 134/85 and her pulse is 82. Her oxygen saturation is 98%.     Chief Complaint: Joint Swelling    Pt has 'goose egg' on L. Elbow from hitting it while mopping 3wks ago. No pain    Edema   This is a new problem. The current episode started 1 to 4 weeks ago (3 wks). The problem occurs constantly. The problem has been unchanged. Pertinent negatives include no arthralgias, chest pain, chills, congestion, coughing, fatigue, fever, headaches, joint swelling, myalgias, nausea, rash, sore throat, vertigo, vomiting or weakness. Nothing aggravates the symptoms. She has tried nothing for the symptoms.       Constitution: Negative for chills, fatigue and fever.   HENT: Negative for congestion and sore throat.    Neck: Negative for painful lymph nodes.   Cardiovascular: Negative for chest pain and leg swelling.   Eyes: Negative for double vision and blurred vision.   Respiratory: Negative for cough and shortness of breath.    Gastrointestinal: Negative for nausea, vomiting and diarrhea.   Genitourinary: Negative for dysuria, frequency, urgency and history of kidney stones.   Musculoskeletal: Negative for joint pain, joint swelling, muscle cramps and muscle ache.   Skin: Negative for color change, pale, rash, erythema and bruising.   Allergic/Immunologic: Negative for seasonal allergies.   Neurological: Negative for dizziness, history of vertigo, light-headedness, passing out and headaches.   Hematologic/Lymphatic: Negative for swollen lymph nodes.   Psychiatric/Behavioral: Negative for nervous/anxious, sleep disturbance and depression. The patient is not nervous/anxious.        Objective:      Physical Exam   Constitutional: She is oriented to person, place, and time. She appears well-developed and well-nourished.   HENT:   Head: " Normocephalic and atraumatic.   Eyes: Pupils are equal, round, and reactive to light. EOM are normal.   Neck: Normal range of motion. Neck supple.   Cardiovascular: Normal rate, regular rhythm and normal heart sounds.   No murmur heard.  Pulmonary/Chest: Breath sounds normal. No respiratory distress. She has no wheezes. She has no rales.   Abdominal: Soft. Bowel sounds are normal. She exhibits no distension. There is no tenderness.   Musculoskeletal: Normal range of motion.        Arms:  Lymphadenopathy:     She has no cervical adenopathy.   Neurological: She is alert and oriented to person, place, and time. No cranial nerve deficit. She exhibits normal muscle tone. Coordination normal.   Skin: Skin is warm. Capillary refill takes less than 2 seconds. No rash noted. No erythema. No pallor.   Psychiatric: She has a normal mood and affect. Her behavior is normal. Judgment and thought content normal.   Vitals reviewed.      Assessment:       1. Olecranon bursitis of left elbow        Plan:         Olecranon bursitis of left elbow  -     diclofenac (VOLTAREN) 75 MG EC tablet; Take 1 tablet (75 mg total) by mouth 2 (two) times daily. for 15 days  Dispense: 30 tablet; Refill: 0    Other orders  -     Cancel: Incision & Drainage          Patient Instructions     Bursitis  You have bursitis. This is an inflammation of the bursa. These are small, fluid-filled sacs that surround the larger joints of the body. The bursa help the muscles and tendons move smoothly over the joints.  Bursitis often happens in the shoulder. But it can also affect the elbows, hips, pelvis, knees, toes, and heels. Bursitis can be caused by injury, overuse of the joint, or infection of the bursa. Symptoms include pain and tenderness over a joint. Symptoms get worse with movement.  Bursitis is treated with an anti-inflammatory medicine and by resting the joint. More severe cases require injection of medicine directly into the bursa.    Home  care  · Rest the painful joint and protect it from movement. This will allow the inflammation to heal faster.  · Apply an ice pack over the injured area for no more than 15 to 20 minutes. Do this every 3 to 6 hours for the first 24 to 48 hours. Keep using ice packs 3 to 4 times a day until the pain and swelling improves.   · To make an ice pack, put ice cubes in a sealed plastic zip-lock bag. Wrap the bag in a clean, thin towel or cloth. Never put ice or an ice pack directly on the skin. As the ice melts, be careful to avoid getting any wrap or splint wet.  · You may take over-the-counter pain medicine to treat pain and inflammation, unless another medicine was prescribed. Anti-inflammatory pain medicines may be more effective. Talk with your provider beforeusing these medicines if you have chronic liver or kidney disease, or ever had a stomach ulcer or GI (gastrointestinal) bleeding.  · As your symptoms improve, slowly begin to move the joint. Do not overuse the joint. This may cause the symptoms to flare up again.  When to seek medical advice  Call your healthcare provider right away if any of these occur:  · Redness over the painful area  · Increasing pain or swelling at the joint  · Fever of 100.4°F (38°C) or above lasting for 24 to 48 hours  Date Last Reviewed: 11/21/2015  © 2311-3774 [employer]. [address] [place] [address], [place] [address]. All rights reserved. This information is not intended as a substitute for professional medical care. Always follow your healthcare professional's instructions.      Follow up with your doctor in a few days as needed.  Return to the urgent care or go to the ER if symptoms get worse.    Jaswinder Rodriguez MD

## 2019-05-08 RX ORDER — LORAZEPAM 0.5 MG/1
TABLET ORAL
Qty: 30 TABLET | Refills: 1 | Status: SHIPPED | OUTPATIENT
Start: 2019-05-08 | End: 2019-11-17 | Stop reason: SDUPTHER

## 2019-06-06 ENCOUNTER — CLINICAL SUPPORT (OUTPATIENT)
Dept: SMOKING CESSATION | Facility: CLINIC | Age: 64
End: 2019-06-06
Payer: COMMERCIAL

## 2019-06-06 DIAGNOSIS — F17.200 NICOTINE DEPENDENCE: Primary | ICD-10-CM

## 2019-06-06 PROCEDURE — 99407 PR TOBACCO USE CESSATION INTENSIVE >10 MINUTES: ICD-10-PCS | Mod: S$GLB,,,

## 2019-06-06 PROCEDURE — 99407 BEHAV CHNG SMOKING > 10 MIN: CPT | Mod: S$GLB,,,

## 2019-06-06 NOTE — PROGRESS NOTES
Successful contact with patient regarding tobacco cessation quit #1. Pt states, she was unable to become tobacco free and she's not ready to return to the program at this time. Pt informed of her benefit status and contact information to schedule an appointment when she's ready. Will update the tobacco cessation smart form for 6-12 months on quit #1 and resolve the episode.

## 2019-06-26 ENCOUNTER — PATIENT OUTREACH (OUTPATIENT)
Dept: ADMINISTRATIVE | Facility: HOSPITAL | Age: 64
End: 2019-06-26

## 2019-06-26 DIAGNOSIS — Z11.59 ENCOUNTER FOR HEPATITIS C SCREENING TEST FOR LOW RISK PATIENT: Primary | ICD-10-CM

## 2019-06-26 NOTE — PROGRESS NOTES
Health Maintenance Due   Topic Date Due    Hepatitis C Screening  1955    TETANUS VACCINE  04/16/1973    Colonoscopy  04/16/2005     Shingles vaccine due.    Pre-visit chart check complete.

## 2019-07-02 ENCOUNTER — LAB VISIT (OUTPATIENT)
Dept: LAB | Facility: HOSPITAL | Age: 64
End: 2019-07-02
Attending: INTERNAL MEDICINE
Payer: COMMERCIAL

## 2019-07-02 DIAGNOSIS — Z00.00 ANNUAL PHYSICAL EXAM: ICD-10-CM

## 2019-07-02 DIAGNOSIS — Z11.59 ENCOUNTER FOR HEPATITIS C SCREENING TEST FOR LOW RISK PATIENT: ICD-10-CM

## 2019-07-02 LAB
ALBUMIN SERPL BCP-MCNC: 4.2 G/DL (ref 3.5–5.2)
ALP SERPL-CCNC: 56 U/L (ref 55–135)
ALT SERPL W/O P-5'-P-CCNC: 35 U/L (ref 10–44)
ANION GAP SERPL CALC-SCNC: 12 MMOL/L (ref 8–16)
AST SERPL-CCNC: 27 U/L (ref 10–40)
BASOPHILS # BLD AUTO: 0.01 K/UL (ref 0–0.2)
BASOPHILS NFR BLD: 0.1 % (ref 0–1.9)
BILIRUB SERPL-MCNC: 0.6 MG/DL (ref 0.1–1)
BUN SERPL-MCNC: 10 MG/DL (ref 8–23)
CALCIUM SERPL-MCNC: 9.7 MG/DL (ref 8.7–10.5)
CHLORIDE SERPL-SCNC: 101 MMOL/L (ref 95–110)
CHOLEST SERPL-MCNC: 218 MG/DL (ref 120–199)
CHOLEST/HDLC SERPL: 2.5 {RATIO} (ref 2–5)
CO2 SERPL-SCNC: 26 MMOL/L (ref 23–29)
CREAT SERPL-MCNC: 0.8 MG/DL (ref 0.5–1.4)
DIFFERENTIAL METHOD: ABNORMAL
EOSINOPHIL # BLD AUTO: 0.2 K/UL (ref 0–0.5)
EOSINOPHIL NFR BLD: 3.1 % (ref 0–8)
ERYTHROCYTE [DISTWIDTH] IN BLOOD BY AUTOMATED COUNT: 13 % (ref 11.5–14.5)
EST. GFR  (AFRICAN AMERICAN): >60 ML/MIN/1.73 M^2
EST. GFR  (NON AFRICAN AMERICAN): >60 ML/MIN/1.73 M^2
GLUCOSE SERPL-MCNC: 104 MG/DL (ref 70–110)
HCT VFR BLD AUTO: 46.2 % (ref 37–48.5)
HCV AB SERPL QL IA: NEGATIVE
HDLC SERPL-MCNC: 86 MG/DL (ref 40–75)
HDLC SERPL: 39.4 % (ref 20–50)
HGB BLD-MCNC: 15.5 G/DL (ref 12–16)
LDLC SERPL CALC-MCNC: 116.2 MG/DL (ref 63–159)
LYMPHOCYTES # BLD AUTO: 2.7 K/UL (ref 1–4.8)
LYMPHOCYTES NFR BLD: 37.5 % (ref 18–48)
MCH RBC QN AUTO: 32.8 PG (ref 27–31)
MCHC RBC AUTO-ENTMCNC: 33.5 G/DL (ref 32–36)
MCV RBC AUTO: 98 FL (ref 82–98)
MONOCYTES # BLD AUTO: 0.6 K/UL (ref 0.3–1)
MONOCYTES NFR BLD: 8.5 % (ref 4–15)
NEUTROPHILS # BLD AUTO: 3.6 K/UL (ref 1.8–7.7)
NEUTROPHILS NFR BLD: 50.8 % (ref 38–73)
NONHDLC SERPL-MCNC: 132 MG/DL
PLATELET # BLD AUTO: 257 K/UL (ref 150–350)
PMV BLD AUTO: 9.5 FL (ref 9.2–12.9)
POTASSIUM SERPL-SCNC: 4.1 MMOL/L (ref 3.5–5.1)
PROT SERPL-MCNC: 7.3 G/DL (ref 6–8.4)
RBC # BLD AUTO: 4.73 M/UL (ref 4–5.4)
SODIUM SERPL-SCNC: 139 MMOL/L (ref 136–145)
TRIGL SERPL-MCNC: 79 MG/DL (ref 30–150)
TSH SERPL DL<=0.005 MIU/L-ACNC: 1.24 UIU/ML (ref 0.4–4)
WBC # BLD AUTO: 7.15 K/UL (ref 3.9–12.7)

## 2019-07-02 PROCEDURE — 84443 ASSAY THYROID STIM HORMONE: CPT

## 2019-07-02 PROCEDURE — 80053 COMPREHEN METABOLIC PANEL: CPT

## 2019-07-02 PROCEDURE — 80061 LIPID PANEL: CPT

## 2019-07-02 PROCEDURE — 85025 COMPLETE CBC W/AUTO DIFF WBC: CPT

## 2019-07-02 PROCEDURE — 36415 COLL VENOUS BLD VENIPUNCTURE: CPT

## 2019-07-02 PROCEDURE — 86803 HEPATITIS C AB TEST: CPT

## 2019-07-09 ENCOUNTER — OFFICE VISIT (OUTPATIENT)
Dept: INTERNAL MEDICINE | Facility: CLINIC | Age: 64
End: 2019-07-09
Payer: COMMERCIAL

## 2019-07-09 VITALS
WEIGHT: 176.56 LBS | TEMPERATURE: 98 F | DIASTOLIC BLOOD PRESSURE: 84 MMHG | SYSTOLIC BLOOD PRESSURE: 126 MMHG | HEART RATE: 85 BPM | BODY MASS INDEX: 26.15 KG/M2 | HEIGHT: 69 IN

## 2019-07-09 DIAGNOSIS — K21.9 GASTROESOPHAGEAL REFLUX DISEASE, ESOPHAGITIS PRESENCE NOT SPECIFIED: ICD-10-CM

## 2019-07-09 DIAGNOSIS — F41.9 ANXIETY: ICD-10-CM

## 2019-07-09 DIAGNOSIS — E78.5 HYPERLIPIDEMIA, UNSPECIFIED HYPERLIPIDEMIA TYPE: ICD-10-CM

## 2019-07-09 DIAGNOSIS — Z72.0 TOBACCO USE: ICD-10-CM

## 2019-07-09 DIAGNOSIS — Z00.00 ANNUAL PHYSICAL EXAM: Primary | ICD-10-CM

## 2019-07-09 PROCEDURE — 99999 PR PBB SHADOW E&M-EST. PATIENT-LVL III: CPT | Mod: PBBFAC,,, | Performed by: INTERNAL MEDICINE

## 2019-07-09 PROCEDURE — 99999 PR PBB SHADOW E&M-EST. PATIENT-LVL III: ICD-10-PCS | Mod: PBBFAC,,, | Performed by: INTERNAL MEDICINE

## 2019-07-09 PROCEDURE — 99396 PR PREVENTIVE VISIT,EST,40-64: ICD-10-PCS | Mod: S$GLB,,, | Performed by: INTERNAL MEDICINE

## 2019-07-09 PROCEDURE — 99396 PREV VISIT EST AGE 40-64: CPT | Mod: S$GLB,,, | Performed by: INTERNAL MEDICINE

## 2019-07-09 NOTE — PROGRESS NOTES
INTERNAL MEDICINE ANNUAL VISIT NOTE      CHIEF COMPLAINT     ANNUAL    HPI     Karina Thomas is a 64 y.o. C female who presents for her annual exam.    Rare use of ativan 0.5mg prn. Takes a few times a week.  appropriate. #30 5/8/19 and still w/ about 15 left.   Mom is at [facility].     HLD - pravastatin 80mg daily.     GERD nexium 40mg daily.     Chronic smoker.     Past Medical History:  Past Medical History:   Diagnosis Date    Anxiety     GERD (gastroesophageal reflux disease)     GERD (gastroesophageal reflux disease)     Hyperlipidemia        Past Surgical History:  Past Surgical History:   Procedure Laterality Date    APPENDECTOMY      HYSTERECTOMY  1989    TVH ov in situ - due to abnormal Pap smear       Allergies:  Review of patient's allergies indicates:  No Known Allergies    Home Medications:  Prior to Admission medications    Medication Sig Start Date End Date Taking? Authorizing Provider   ascorbate calcium (ZENIA-C ORAL) Take by mouth.    Historical Provider, MD   aspirin 81 MG Chew Take 81 mg by mouth once daily.    Historical Provider, MD   BILBERRY ORAL Take by mouth.    Historical Provider, MD   CHOLINE ORAL Take 500 mg by mouth.    Historical Provider, MD   cyanocobalamin (VITAMIN B-12) 1000 MCG tablet Take 3,000 mcg by mouth once daily.     Historical Provider, MD   esomeprazole (NEXIUM) 40 MG capsule TAKE ONE CAPSULE BY MOUTH EVERY DAY 3/12/19   Pinky Reyes MD   INV folate 800 MCG tablet Take 800 mcg by mouth once daily. Methylfolate    Historical Provider, MD   L. rhamnosus GG/inulin (CULTURELLE PROBIOTICS ORAL) Take by mouth.    Historical Provider, MD   LORazepam (ATIVAN) 0.5 MG tablet TAKE ONE TABLET BY MOUTH EVERY DAY AS NEEDED 5/8/19   Pinky Reyes MD   milk thistle 175 mg tablet Take 175 mg by mouth once daily.    Historical Provider, MD   omega-3 acid ethyl esters (LOVAZA) 1 gram capsule Take 2 capsules (2 g total) by mouth 2 (two) times daily. 3/13/19 3/12/20  Pinky Reyes MD  "  pravastatin (PRAVACHOL) 80 MG tablet Take 1 tablet (80 mg total) by mouth once daily. 11/30/18   Pinky Reyes MD   ubidecarenone/vitamin E mixed (COQ10  ORAL) Take by mouth.    Historical Provider, MD   vitamin D 1000 units Tab Take 185 mg by mouth once daily.    Historical Provider, MD       Family History:  Family History   Problem Relation Age of Onset    Cancer Father         unknown CA    Alzheimer's disease Mother     Heart disease Brother         cad s/p stent 64    No Known Problems Daughter     No Known Problems Son     Breast cancer Neg Hx     Colon cancer Neg Hx     Ovarian cancer Neg Hx        Social History:  Social History     Tobacco Use    Smoking status: Current Every Day Smoker     Packs/day: 1.00     Years: 25.00     Pack years: 25.00    Smokeless tobacco: Never Used   Substance Use Topics    Alcohol use: Yes     Alcohol/week: 4.2 oz     Types: 7 Glasses of wine per week    Drug use: No       Review of Systems:  Review of Systems Comprehensive review of systems otherwise negative. See history/subjective section for more details.    Health Maintainence:   Td - within last 10 yrs.   Flu - out of season  Pneumovax - had this already.   Zoster - had this already.   MMG - 2/19/19 - neg  C-SCOPE - due. Follows w/ Dr. Simmons - upcoming appt to schedule Cscope.   Hep C screening - done. Need record    PHYSICAL EXAM     /84 (BP Location: Left arm, Patient Position: Sitting, BP Method: Large (Manual))   Pulse 85   Temp 98.2 °F (36.8 °C)   Ht 5' 9" (1.753 m)   Wt 80.1 kg (176 lb 9.4 oz)   BMI 26.08 kg/m²     GEN - A+OX4, NAD   HEENT - PERRL, EOMI, OP clear. MMM.   Neck - No thyromegaly or cervical LAD. No thyroid masses felt.  CV - RRR, no m/r   Chest - CTAB, no wheezing or rhonchi  Abd - S/NT/ND/+BS.   Ext - 2+BDP and radial pulses. No LE edema.   Neuro - PERRL, EOMI, no nystagmus, eyebrow raise, facial sensation, hearing, m of mastication, smile, palatal raise, shoulder shrug, " tongue protrusion symmetric and intact. 5/5 BUE and BLE strength. Sensation to light touch intact throughout. 2+ DTRs. Normal gait.   MSK - No spinal tenderness to palpation. Normal gait.   Skin - No rash.    LABS     Previous labs reviewed.    ASSESSMENT/PLAN     Karina Thomas is a 64 y.o. female with  Karina was seen today for annual exam.    Diagnoses and all orders for this visit:    Annual physical exam - labs reviewed.     Anxiety - infrequent ativan use. Can cont. No issues.     Hyperlipidemia, unspecified hyperlipidemia type - cont prava 80mg dialy.     Gastroesophageal reflux disease, esophagitis presence not specified - cont PPI.    Tobacco use - Still smoking. Not ready to quit yet.     Cscope w/ Dr. Simmons and will send me a copy.  RTC in 12 months, sooner if needed and depending on labs.    Pinky Reyes MD  Department of Internal Medicine - [facility]  9:48 AM

## 2019-10-24 ENCOUNTER — OFFICE VISIT (OUTPATIENT)
Dept: URGENT CARE | Facility: CLINIC | Age: 64
End: 2019-10-24
Payer: COMMERCIAL

## 2019-10-24 VITALS
HEIGHT: 69 IN | WEIGHT: 176 LBS | HEART RATE: 93 BPM | DIASTOLIC BLOOD PRESSURE: 90 MMHG | TEMPERATURE: 97 F | SYSTOLIC BLOOD PRESSURE: 143 MMHG | RESPIRATION RATE: 18 BRPM | BODY MASS INDEX: 26.07 KG/M2 | OXYGEN SATURATION: 97 %

## 2019-10-24 DIAGNOSIS — J01.90 ACUTE BACTERIAL SINUSITIS: Primary | ICD-10-CM

## 2019-10-24 DIAGNOSIS — R05.9 COUGH: ICD-10-CM

## 2019-10-24 DIAGNOSIS — B96.89 ACUTE BACTERIAL SINUSITIS: Primary | ICD-10-CM

## 2019-10-24 DIAGNOSIS — R09.82 POST-NASAL DRIP: ICD-10-CM

## 2019-10-24 PROCEDURE — 96372 THER/PROPH/DIAG INJ SC/IM: CPT | Mod: S$GLB,,, | Performed by: NURSE PRACTITIONER

## 2019-10-24 PROCEDURE — 3008F BODY MASS INDEX DOCD: CPT | Mod: CPTII,S$GLB,, | Performed by: NURSE PRACTITIONER

## 2019-10-24 PROCEDURE — 99214 PR OFFICE/OUTPT VISIT, EST, LEVL IV, 30-39 MIN: ICD-10-PCS | Mod: 25,S$GLB,, | Performed by: NURSE PRACTITIONER

## 2019-10-24 PROCEDURE — 3008F PR BODY MASS INDEX (BMI) DOCUMENTED: ICD-10-PCS | Mod: CPTII,S$GLB,, | Performed by: NURSE PRACTITIONER

## 2019-10-24 PROCEDURE — 96372 PR INJECTION,THERAP/PROPH/DIAG2ST, IM OR SUBCUT: ICD-10-PCS | Mod: S$GLB,,, | Performed by: NURSE PRACTITIONER

## 2019-10-24 PROCEDURE — 99214 OFFICE O/P EST MOD 30 MIN: CPT | Mod: 25,S$GLB,, | Performed by: NURSE PRACTITIONER

## 2019-10-24 RX ORDER — CODEINE PHOSPHATE AND GUAIFENESIN 10; 100 MG/5ML; MG/5ML
5 SOLUTION ORAL 3 TIMES DAILY PRN
Qty: 120 ML | Refills: 0 | Status: SHIPPED | OUTPATIENT
Start: 2019-10-24 | End: 2019-11-03

## 2019-10-24 RX ORDER — BETAMETHASONE SODIUM PHOSPHATE AND BETAMETHASONE ACETATE 3; 3 MG/ML; MG/ML
9 INJECTION, SUSPENSION INTRA-ARTICULAR; INTRALESIONAL; INTRAMUSCULAR; SOFT TISSUE
Status: COMPLETED | OUTPATIENT
Start: 2019-10-24 | End: 2019-10-24

## 2019-10-24 RX ORDER — FLUTICASONE PROPIONATE 50 MCG
2 SPRAY, SUSPENSION (ML) NASAL DAILY
Qty: 1 BOTTLE | Refills: 0 | Status: SHIPPED | OUTPATIENT
Start: 2019-10-24 | End: 2022-05-03

## 2019-10-24 RX ORDER — AMOXICILLIN AND CLAVULANATE POTASSIUM 875; 125 MG/1; MG/1
1 TABLET, FILM COATED ORAL 2 TIMES DAILY
Qty: 20 TABLET | Refills: 0 | Status: SHIPPED | OUTPATIENT
Start: 2019-10-24 | End: 2020-06-01

## 2019-10-24 RX ADMIN — BETAMETHASONE SODIUM PHOSPHATE AND BETAMETHASONE ACETATE 9 MG: 3; 3 INJECTION, SUSPENSION INTRA-ARTICULAR; INTRALESIONAL; INTRAMUSCULAR; SOFT TISSUE at 10:10

## 2019-10-24 NOTE — PROGRESS NOTES
"Subjective:       Patient ID: Karina Thomas is a 64 y.o. female.    Vitals:  height is 5' 9" (1.753 m) and weight is 79.8 kg (176 lb). Her oral temperature is 97.4 °F (36.3 °C). Her blood pressure is 143/90 (abnormal) and her pulse is 93. Her respiration is 18 and oxygen saturation is 97%.     Chief Complaint: URI    Patient presents to clinic today with complaints of worsening sinus congestion and sinus pressure with persistent postnasal drip over the last week.  Patient reports that she has developed a worsening cough that is worse at night when lying flat.  Patient has been taking over-the-counter cough suppressant with minimal relief.  Patient denies any chest pain or shortness of breath.  Denies any fever or chills.    URI    This is a new problem. The current episode started 1 to 4 weeks ago (a wk). The problem has been gradually worsening. There has been no fever. Associated symptoms include congestion, coughing, headaches, rhinorrhea and sneezing. Pertinent negatives include no ear pain, nausea, rash, sinus pain, sore throat, vomiting or wheezing. Treatments tried: delsum. The treatment provided mild relief.       Constitution: Positive for fatigue. Negative for chills, sweating and fever.   HENT: Positive for congestion, postnasal drip and sinus pressure. Negative for ear pain, sinus pain, sore throat and voice change.    Neck: Negative for painful lymph nodes.   Eyes: Negative for eye redness.   Respiratory: Positive for cough and sputum production. Negative for chest tightness, bloody sputum, COPD, shortness of breath, stridor, wheezing and asthma.    Gastrointestinal: Negative for nausea and vomiting.   Musculoskeletal: Negative for muscle ache.   Skin: Negative for rash.   Allergic/Immunologic: Positive for sneezing. Negative for seasonal allergies and asthma.   Neurological: Positive for headaches.   Hematologic/Lymphatic: Negative for swollen lymph nodes.       Objective:      Physical Exam "   Constitutional: She is oriented to person, place, and time. She appears well-developed and well-nourished. She is cooperative.  Non-toxic appearance. She does not have a sickly appearance. She does not appear ill. No distress.   HENT:   Head: Normocephalic and atraumatic.   Right Ear: Hearing, external ear and ear canal normal. Tympanic membrane is bulging.   Left Ear: Hearing, external ear and ear canal normal. Tympanic membrane is bulging.   Nose: Mucosal edema and rhinorrhea present. No nasal deformity. No epistaxis. Right sinus exhibits no maxillary sinus tenderness and no frontal sinus tenderness. Left sinus exhibits no maxillary sinus tenderness and no frontal sinus tenderness.   Mouth/Throat: Uvula is midline and mucous membranes are normal. No trismus in the jaw. Normal dentition. No uvula swelling. Posterior oropharyngeal erythema and cobblestoning present. No oropharyngeal exudate or posterior oropharyngeal edema.   Eyes: Conjunctivae and lids are normal. No scleral icterus.   Neck: Trachea normal, full passive range of motion without pain and phonation normal. Neck supple. No neck rigidity. No edema and no erythema present.   Cardiovascular: Normal rate, regular rhythm, normal heart sounds, intact distal pulses and normal pulses.   Pulmonary/Chest: Effort normal and breath sounds normal. No respiratory distress. She has no decreased breath sounds. She has no rhonchi.   Abdominal: Normal appearance.   Musculoskeletal: Normal range of motion. She exhibits no edema or deformity.   Neurological: She is alert and oriented to person, place, and time. She exhibits normal muscle tone. Coordination normal.   Skin: Skin is warm, dry, intact, not diaphoretic and not pale.   Psychiatric: She has a normal mood and affect. Her speech is normal and behavior is normal. Judgment and thought content normal. Cognition and memory are normal.   Nursing note and vitals reviewed.        Assessment:       1. Acute bacterial  sinusitis    2. Post-nasal drip    3. Cough        Plan:         Acute bacterial sinusitis  -     betamethasone acetate-betamethasone sodium phosphate injection 9 mg  -     fluticasone propionate (FLONASE) 50 mcg/actuation nasal spray; 2 sprays (100 mcg total) by Each Nostril route once daily.  Dispense: 1 Bottle; Refill: 0  -     amoxicillin-clavulanate 875-125mg (AUGMENTIN) 875-125 mg per tablet; Take 1 tablet by mouth 2 (two) times daily.  Dispense: 20 tablet; Refill: 0    Post-nasal drip  -     betamethasone acetate-betamethasone sodium phosphate injection 9 mg  -     fluticasone propionate (FLONASE) 50 mcg/actuation nasal spray; 2 sprays (100 mcg total) by Each Nostril route once daily.  Dispense: 1 Bottle; Refill: 0    Cough  -     betamethasone acetate-betamethasone sodium phosphate injection 9 mg  -     guaifenesin-codeine 100-10 mg/5 ml (TUSSI-ORGANIDIN NR)  mg/5 mL syrup; Take 5 mLs by mouth 3 (three) times daily as needed for Cough.  Dispense: 120 mL; Refill: 0      Patient Instructions     Sinusitis (Antibiotic Treatment)    The sinuses are air-filled spaces within the bones of the face. They connect to the inside of the nose. Sinusitis is an inflammation of the tissue lining the sinus cavity. Sinus inflammation can occur during a cold. It can also be due to allergies to pollens and other particles in the air. Sinusitis can cause symptoms of sinus congestion and fullness. A sinus infection causes fever, headache and facial pain. There is often green or yellow drainage from the nose or into the back of the throat (post-nasal drip). You have been given antibiotics to treat this condition.  Home care:  · Take the full course of antibiotics as instructed. Do not stop taking them, even if you feel better.  · Drink plenty of water, hot tea, and other liquids. This may help thin mucus. It also may promote sinus drainage.  · Heat may help soothe painful areas of the face. Use a towel soaked in hot water.  Or,  the shower and direct the hot spray onto your face. Using a vaporizer along with a menthol rub at night may also help.   · An expectorant containing guaifenesin may help thin the mucus and promote drainage from the sinuses.  · Over-the-counter decongestants may be used unless a similar medicine was prescribed. Nasal sprays work the fastest. Use one that contains phenylephrine or oxymetazoline. First blow the nose gently. Then use the spray. Do not use these medicines more often than directed on the label or symptoms may get worse. You may also use tablets containing pseudoephedrine. Avoid products that combine ingredients, because side effects may be increased. Read labels. You can also ask the pharmacist for help. (NOTE: Persons with high blood pressure should not use decongestants. They can raise blood pressure.)  · Over-the-counter antihistamines may help if allergies contributed to your sinusitis.    · Do not use nasal rinses or irrigation during an acute sinus infection, unless told to by your health care provider. Rinsing may spread the infection to other sinuses.  · Use acetaminophen or ibuprofen to control pain, unless another pain medicine was prescribed. (If you have chronic liver or kidney disease or ever had a stomach ulcer, talk with your doctor before using these medicines. Aspirin should never be used in anyone under 18 years of age who is ill with a fever. It may cause severe liver damage.)  · Don't smoke. This can worsen symptoms.  Follow-up care  Follow up with your healthcare provider or our staff if you are not improving within the next week.  When to seek medical advice  Call your healthcare provider if any of these occur:  · Facial pain or headache becoming more severe  · Stiff neck  · Unusual drowsiness or confusion  · Swelling of the forehead or eyelids  · Vision problems, including blurred or double vision  · Fever of 100.4ºF (38ºC) or higher, or as directed by your healthcare  provider  · Seizure  · Breathing problems  · Symptoms not resolving within 10 days  Date Last Reviewed: 4/13/2015  © 5950-6838 [employer], [employer]. [address] [place] [address], [place] [address]. All rights reserved. This information is not intended as a substitute for professional medical care. Always follow your healthcare professional's instructions.      -Flonase daily.  -Steroid shot given here today.  -Cough syrup to take at night, do not drive or operate machinery while taking.  -Increase your fluid intake.  -10 days of antibiotics.  Please follow up with your Primary care provider within 2-5 days if your signs and symptoms have not resolved or worsen.     If your condition worsens or fails to improve we recommend that you receive another evaluation at the emergency room immediately or contact your primary medical clinic to discuss your concerns.   You must understand that you have received an Urgent Care treatment only and that you may be released before all of your medical problems are known or treated. You, the patient, will arrange for follow up care as instructed.

## 2019-10-24 NOTE — PATIENT INSTRUCTIONS
Sinusitis (Antibiotic Treatment)    The sinuses are air-filled spaces within the bones of the face. They connect to the inside of the nose. Sinusitis is an inflammation of the tissue lining the sinus cavity. Sinus inflammation can occur during a cold. It can also be due to allergies to pollens and other particles in the air. Sinusitis can cause symptoms of sinus congestion and fullness. A sinus infection causes fever, headache and facial pain. There is often green or yellow drainage from the nose or into the back of the throat (post-nasal drip). You have been given antibiotics to treat this condition.  Home care:  · Take the full course of antibiotics as instructed. Do not stop taking them, even if you feel better.  · Drink plenty of water, hot tea, and other liquids. This may help thin mucus. It also may promote sinus drainage.  · Heat may help soothe painful areas of the face. Use a towel soaked in hot water. Or,  the shower and direct the hot spray onto your face. Using a vaporizer along with a menthol rub at night may also help.   · An expectorant containing guaifenesin may help thin the mucus and promote drainage from the sinuses.  · Over-the-counter decongestants may be used unless a similar medicine was prescribed. Nasal sprays work the fastest. Use one that contains phenylephrine or oxymetazoline. First blow the nose gently. Then use the spray. Do not use these medicines more often than directed on the label or symptoms may get worse. You may also use tablets containing pseudoephedrine. Avoid products that combine ingredients, because side effects may be increased. Read labels. You can also ask the pharmacist for help. (NOTE: Persons with high blood pressure should not use decongestants. They can raise blood pressure.)  · Over-the-counter antihistamines may help if allergies contributed to your sinusitis.    · Do not use nasal rinses or irrigation during an acute sinus infection, unless told to by  your health care provider. Rinsing may spread the infection to other sinuses.  · Use acetaminophen or ibuprofen to control pain, unless another pain medicine was prescribed. (If you have chronic liver or kidney disease or ever had a stomach ulcer, talk with your doctor before using these medicines. Aspirin should never be used in anyone under 18 years of age who is ill with a fever. It may cause severe liver damage.)  · Don't smoke. This can worsen symptoms.  Follow-up care  Follow up with your healthcare provider or our staff if you are not improving within the next week.  When to seek medical advice  Call your healthcare provider if any of these occur:  · Facial pain or headache becoming more severe  · Stiff neck  · Unusual drowsiness or confusion  · Swelling of the forehead or eyelids  · Vision problems, including blurred or double vision  · Fever of 100.4ºF (38ºC) or higher, or as directed by your healthcare provider  · Seizure  · Breathing problems  · Symptoms not resolving within 10 days  Date Last Reviewed: 4/13/2015  © 7195-4778 [employer]. [address] [place] [address]. All rights reserved. This information is not intended as a substitute for professional medical care. Always follow your healthcare professional's instructions.      -Flonase daily.  -Steroid shot given here today.  -Cough syrup to take at night, do not drive or operate machinery while taking.  -Increase your fluid intake.  -10 days of antibiotics.  Please follow up with your Primary care provider within 2-5 days if your signs and symptoms have not resolved or worsen.     If your condition worsens or fails to improve we recommend that you receive another evaluation at the emergency room immediately or contact your primary medical clinic to discuss your concerns.   You must understand that you have received an Urgent Care treatment only and that you may be released before all of your medical problems are known or  treated. You, the patient, will arrange for follow up care as instructed.

## 2019-10-26 ENCOUNTER — PATIENT MESSAGE (OUTPATIENT)
Dept: INTERNAL MEDICINE | Facility: CLINIC | Age: 64
End: 2019-10-26

## 2019-10-30 ENCOUNTER — PATIENT MESSAGE (OUTPATIENT)
Dept: INTERNAL MEDICINE | Facility: CLINIC | Age: 64
End: 2019-10-30

## 2019-11-18 DIAGNOSIS — Z12.11 COLON CANCER SCREENING: ICD-10-CM

## 2019-11-18 RX ORDER — PRAVASTATIN SODIUM 80 MG/1
TABLET ORAL
Qty: 90 TABLET | Refills: 0 | Status: SHIPPED | OUTPATIENT
Start: 2019-11-18 | End: 2020-04-03

## 2019-11-18 RX ORDER — LORAZEPAM 0.5 MG/1
TABLET ORAL
Qty: 30 TABLET | Refills: 1 | Status: SHIPPED | OUTPATIENT
Start: 2019-11-18 | End: 2020-06-01 | Stop reason: SDUPTHER

## 2019-11-18 NOTE — TELEPHONE ENCOUNTER
Please let her know that the electronic prescription is not working currently for controlled substances. I printed to script. Please see if she can come .

## 2020-02-19 ENCOUNTER — LAB VISIT (OUTPATIENT)
Dept: LAB | Facility: HOSPITAL | Age: 65
End: 2020-02-19
Attending: INTERNAL MEDICINE
Payer: COMMERCIAL

## 2020-02-19 ENCOUNTER — TELEPHONE (OUTPATIENT)
Dept: INTERNAL MEDICINE | Facility: CLINIC | Age: 65
End: 2020-02-19

## 2020-02-19 ENCOUNTER — OFFICE VISIT (OUTPATIENT)
Dept: INTERNAL MEDICINE | Facility: CLINIC | Age: 65
End: 2020-02-19
Payer: COMMERCIAL

## 2020-02-19 VITALS
HEIGHT: 69 IN | WEIGHT: 176.38 LBS | OXYGEN SATURATION: 98 % | HEART RATE: 92 BPM | DIASTOLIC BLOOD PRESSURE: 82 MMHG | BODY MASS INDEX: 26.12 KG/M2 | SYSTOLIC BLOOD PRESSURE: 120 MMHG

## 2020-02-19 DIAGNOSIS — R19.7 DIARRHEA, UNSPECIFIED TYPE: ICD-10-CM

## 2020-02-19 DIAGNOSIS — R19.7 DIARRHEA, UNSPECIFIED TYPE: Primary | ICD-10-CM

## 2020-02-19 LAB
ALBUMIN SERPL BCP-MCNC: 3.9 G/DL (ref 3.5–5.2)
ALP SERPL-CCNC: 56 U/L (ref 55–135)
ALT SERPL W/O P-5'-P-CCNC: 33 U/L (ref 10–44)
ANION GAP SERPL CALC-SCNC: 8 MMOL/L (ref 8–16)
AST SERPL-CCNC: 24 U/L (ref 10–40)
BASOPHILS # BLD AUTO: 0.02 K/UL (ref 0–0.2)
BASOPHILS NFR BLD: 0.3 % (ref 0–1.9)
BILIRUB SERPL-MCNC: 0.4 MG/DL (ref 0.1–1)
BUN SERPL-MCNC: 9 MG/DL (ref 8–23)
CALCIUM SERPL-MCNC: 8.9 MG/DL (ref 8.7–10.5)
CHLORIDE SERPL-SCNC: 104 MMOL/L (ref 95–110)
CO2 SERPL-SCNC: 27 MMOL/L (ref 23–29)
CREAT SERPL-MCNC: 0.7 MG/DL (ref 0.5–1.4)
DIFFERENTIAL METHOD: ABNORMAL
EOSINOPHIL # BLD AUTO: 0.2 K/UL (ref 0–0.5)
EOSINOPHIL NFR BLD: 2.7 % (ref 0–8)
ERYTHROCYTE [DISTWIDTH] IN BLOOD BY AUTOMATED COUNT: 12.5 % (ref 11.5–14.5)
EST. GFR  (AFRICAN AMERICAN): >60 ML/MIN/1.73 M^2
EST. GFR  (NON AFRICAN AMERICAN): >60 ML/MIN/1.73 M^2
GLUCOSE SERPL-MCNC: 99 MG/DL (ref 70–110)
HCT VFR BLD AUTO: 44.4 % (ref 37–48.5)
HGB BLD-MCNC: 14.6 G/DL (ref 12–16)
IMM GRANULOCYTES # BLD AUTO: 0.02 K/UL (ref 0–0.04)
IMM GRANULOCYTES NFR BLD AUTO: 0.3 % (ref 0–0.5)
LYMPHOCYTES # BLD AUTO: 1.8 K/UL (ref 1–4.8)
LYMPHOCYTES NFR BLD: 26.4 % (ref 18–48)
MCH RBC QN AUTO: 32 PG (ref 27–31)
MCHC RBC AUTO-ENTMCNC: 32.9 G/DL (ref 32–36)
MCV RBC AUTO: 97 FL (ref 82–98)
MONOCYTES # BLD AUTO: 0.7 K/UL (ref 0.3–1)
MONOCYTES NFR BLD: 9.8 % (ref 4–15)
NEUTROPHILS # BLD AUTO: 4 K/UL (ref 1.8–7.7)
NEUTROPHILS NFR BLD: 60.5 % (ref 38–73)
NRBC BLD-RTO: 0 /100 WBC
PLATELET # BLD AUTO: 255 K/UL (ref 150–350)
PMV BLD AUTO: 9.6 FL (ref 9.2–12.9)
POTASSIUM SERPL-SCNC: 3.8 MMOL/L (ref 3.5–5.1)
PROT SERPL-MCNC: 6.9 G/DL (ref 6–8.4)
RBC # BLD AUTO: 4.56 M/UL (ref 4–5.4)
SODIUM SERPL-SCNC: 139 MMOL/L (ref 136–145)
WBC # BLD AUTO: 6.63 K/UL (ref 3.9–12.7)

## 2020-02-19 PROCEDURE — 99213 OFFICE O/P EST LOW 20 MIN: CPT | Mod: S$GLB,,, | Performed by: INTERNAL MEDICINE

## 2020-02-19 PROCEDURE — 85025 COMPLETE CBC W/AUTO DIFF WBC: CPT

## 2020-02-19 PROCEDURE — 3008F BODY MASS INDEX DOCD: CPT | Mod: CPTII,S$GLB,, | Performed by: INTERNAL MEDICINE

## 2020-02-19 PROCEDURE — 36415 COLL VENOUS BLD VENIPUNCTURE: CPT

## 2020-02-19 PROCEDURE — 99999 PR PBB SHADOW E&M-EST. PATIENT-LVL III: CPT | Mod: PBBFAC,,, | Performed by: INTERNAL MEDICINE

## 2020-02-19 PROCEDURE — 99999 PR PBB SHADOW E&M-EST. PATIENT-LVL III: ICD-10-PCS | Mod: PBBFAC,,, | Performed by: INTERNAL MEDICINE

## 2020-02-19 PROCEDURE — 80053 COMPREHEN METABOLIC PANEL: CPT

## 2020-02-19 PROCEDURE — 99213 PR OFFICE/OUTPT VISIT, EST, LEVL III, 20-29 MIN: ICD-10-PCS | Mod: S$GLB,,, | Performed by: INTERNAL MEDICINE

## 2020-02-19 PROCEDURE — 3008F PR BODY MASS INDEX (BMI) DOCUMENTED: ICD-10-PCS | Mod: CPTII,S$GLB,, | Performed by: INTERNAL MEDICINE

## 2020-02-19 NOTE — PROGRESS NOTES
Subjective:       Patient ID: Karina Thomas is a 64 y.o. female.    Chief Complaint: Diarrhea; Abdominal Cramping; and Shoulder Pain    64 year old lady with intermittent diarrhea for about a month.  No fever, no travel, no questionable food or water sources.  Diarrhea is watery, never bloody.  Occasionally has associated cramping but not consistently.  Some days she has no BM at all.  Most days she has at least 2 watery stools.  Is able to eat and drink normally.   No ill family members.  Patient is scheduled for colonoscopy in April with [facility]    Review of Systems   Constitutional: Negative for activity change, chills, fatigue and fever.   HENT: Negative for congestion, ear pain, nosebleeds, postnasal drip, sinus pressure and sore throat.    Eyes: Negative.  Negative for visual disturbance.   Respiratory: Negative for cough, chest tightness, shortness of breath and wheezing.    Cardiovascular: Negative for chest pain.   Gastrointestinal: Negative for abdominal pain, diarrhea, nausea and vomiting.   Genitourinary: Negative for difficulty urinating, dysuria, frequency and urgency.   Musculoskeletal: Negative for arthralgias and neck stiffness.   Skin: Negative for rash.   Neurological: Negative for dizziness, weakness and headaches.   Psychiatric/Behavioral: Negative for sleep disturbance. The patient is not nervous/anxious.        Objective:      Physical Exam   Constitutional: She is oriented to person, place, and time. She appears well-developed and well-nourished.  Non-toxic appearance. No distress.   HENT:   Head: Normocephalic and atraumatic.   Right Ear: Tympanic membrane, external ear and ear canal normal.   Left Ear: Tympanic membrane, external ear and ear canal normal.   Eyes: Pupils are equal, round, and reactive to light. EOM are normal. No scleral icterus.   Neck: Normal range of motion. Neck supple. No thyromegaly present.   Cardiovascular: Normal rate, regular rhythm and normal heart sounds.    Pulmonary/Chest: Effort normal and breath sounds normal.   Abdominal: Soft. Bowel sounds are normal. She exhibits no mass. There is no tenderness. There is no rebound.   Musculoskeletal: Normal range of motion.   Lymphadenopathy:     She has no cervical adenopathy.   Neurological: She is alert and oriented to person, place, and time. She has normal reflexes. She displays normal reflexes. No cranial nerve deficit. She exhibits normal muscle tone. Coordination normal.   Skin: Skin is warm and dry.   Psychiatric: She has a normal mood and affect. Her behavior is normal.       Assessment:       1. Diarrhea, unspecified type        Plan:   Karina was seen today for diarrhea, abdominal cramping and shoulder pain.    Diagnoses and all orders for this visit:    Diarrhea, unspecified type  -     CBC auto differential; Future  -     Comprehensive metabolic panel; Future  -     Stool Exam-Ova,Cysts,Parasites; Future  -     Stool culture; Future

## 2020-02-20 ENCOUNTER — LAB VISIT (OUTPATIENT)
Dept: LAB | Facility: HOSPITAL | Age: 65
End: 2020-02-20
Attending: INTERNAL MEDICINE
Payer: COMMERCIAL

## 2020-02-20 DIAGNOSIS — R19.7 DIARRHEA, UNSPECIFIED TYPE: ICD-10-CM

## 2020-02-20 PROCEDURE — 87045 FECES CULTURE AEROBIC BACT: CPT

## 2020-02-20 PROCEDURE — 87046 STOOL CULTR AEROBIC BACT EA: CPT | Mod: 59

## 2020-02-20 PROCEDURE — 87427 SHIGA-LIKE TOXIN AG IA: CPT | Mod: 59

## 2020-02-20 PROCEDURE — 87209 SMEAR COMPLEX STAIN: CPT

## 2020-02-21 LAB
E COLI SXT1 STL QL IA: NEGATIVE
E COLI SXT2 STL QL IA: NEGATIVE
O+P STL TRI STN: NORMAL

## 2020-02-24 LAB — BACTERIA STL CULT: NORMAL

## 2020-04-03 RX ORDER — PRAVASTATIN SODIUM 80 MG/1
TABLET ORAL
Qty: 90 TABLET | Refills: 0 | Status: SHIPPED | OUTPATIENT
Start: 2020-04-03 | End: 2020-06-29

## 2020-04-03 RX ORDER — ESOMEPRAZOLE MAGNESIUM 40 MG/1
CAPSULE, DELAYED RELEASE ORAL
Qty: 90 CAPSULE | Refills: 0 | Status: SHIPPED | OUTPATIENT
Start: 2020-04-03 | End: 2020-06-29

## 2020-04-05 ENCOUNTER — OFFICE VISIT (OUTPATIENT)
Dept: INTERNAL MEDICINE | Facility: CLINIC | Age: 65
End: 2020-04-05
Attending: INTERNAL MEDICINE
Payer: MEDICARE

## 2020-04-05 ENCOUNTER — PATIENT MESSAGE (OUTPATIENT)
Dept: INTERNAL MEDICINE | Facility: CLINIC | Age: 65
End: 2020-04-05

## 2020-04-05 ENCOUNTER — PATIENT MESSAGE (OUTPATIENT)
Dept: PRIMARY CARE CLINIC | Facility: CLINIC | Age: 65
End: 2020-04-05

## 2020-04-05 DIAGNOSIS — J30.2 SEASONAL ALLERGIC RHINITIS, UNSPECIFIED TRIGGER: Primary | ICD-10-CM

## 2020-04-05 PROCEDURE — 99213 PR OFFICE/OUTPT VISIT, EST, LEVL III, 20-29 MIN: ICD-10-PCS | Mod: 95,,, | Performed by: INTERNAL MEDICINE

## 2020-04-05 PROCEDURE — 99213 OFFICE O/P EST LOW 20 MIN: CPT | Mod: 95,,, | Performed by: INTERNAL MEDICINE

## 2020-04-05 NOTE — PATIENT INSTRUCTIONS
Fluticasone nasal spray  What is this medicine?  FLUTICASONE (floo TIK a sone) is a corticosteroid. This medicine is used to treat the symptoms of allergies like sneezing, itchy red eyes, and itchy, runny, or stuffy nose.  How should I use this medicine?  This medicine is for use in the nose. Follow the directions on your product or prescription label. This medicine works best if used at regular intervals. Do not use more often than directed. Make sure that you are using your nasal spray correctly. After 6 months of daily use without a prescription, talk to your doctor or health care professional before using it for a longer time. Ask your doctor or health care professional if you have any questions.  Talk to your pediatrician regarding the use of this medicine in children. Special care may be needed. This medicine has been used in children as young as 2 years. After two months of daily use without a prescription in a child, talk to your pediatrician before using it for a longer time.  What side effects may I notice from receiving this medicine?  Side effects that you should report to your doctor or health care professional as soon as possible:  · allergic reactions like skin rash, itching or hives, swelling of the face, lips, or tongue  · changes in vision  · flu-like symptoms  · white patches or sores in the mouth or nose  Side effects that usually do not require medical attention (report to your doctor or health care professional if they continue or are bothersome):  · burning or irritation inside the nose or throat  · cough  · headache  · nosebleed  · unusual taste or smell  What may interact with this medicine?  · ketoconazole  · metyrapone  · some medicines for HIV  · vaccines  What if I miss a dose?  If you miss a dose, use it as soon as you remember. If it is almost time for your next dose, use only that dose and continue with your regular schedule. Do not use double or extra doses.  Where should I keep my  medicine?  Keep out of the reach of children.  Store at room temperature between 15 and 30 degrees C (59 and 86 degrees F). Throw away any unused medicine after the expiration date.  What should I tell my health care provider before I take this medicine?  They need to know if you have any of these conditions:  · infection, like tuberculosis, herpes, or fungal infection  · recent surgery on nose or sinuses  · taking corticosteroid by mouth  · an unusual or allergic reaction to fluticasone, steroids, other medicines, foods, dyes, or preservatives  · pregnant or trying to get pregnant  · breast-feeding  What should I watch for while using this medicine?  Visit your doctor or health care professional for regular checks on your progress. Some symptoms may improve within 12 hours after starting use. Check with your doctor or health care professional if there is no improvement in your condition after 3 weeks of use.  Do not come in contact with people who have chickenpox or the measles while you are taking this medicine. If you do, call your doctor right away.  NOTE:This sheet is a summary. It may not cover all possible information. If you have questions about this medicine, talk to your doctor, pharmacist, or health care provider. Copyright© 2017 [employer]        Causes of Nasal Allergies  Nasal allergies are most commonly caused by one or more of 4 kinds of allergens: pollen (which causes seasonal allergies), house-dust mites, mold, and animals. Other substances, called irritants, can bother the nose and make allergy symptoms worse.    Pollen  Plants reproduce by moving tiny grains of pollen from plant to plant. Some pollen is carried by bees, and some is blown by the wind. Its the wind-blown pollen that causes nasal allergies. The amount of pollen in the air varies from season to season.  House-dust mites  House-dust mites are tiny bugs too small to see. They can live in mattresses, blankets, stuffed toys, carpets,  and curtains. The droppings of these mites are a common indoor cause of nasal allergies.  Mold  Mold loves dark, damp areas. It tends to grow in bathrooms, basements, refrigerators, and in the soil of houseplants. Mold reproduces by sending tiny grains called spores into the air. If these spores are breathed in, they can cause a nasal allergic reaction.  Animals  Pets, such as cats, dogs, birds, horses, and rabbits, are common causes of nasal allergies. Flakes of skin (dander), saliva left on fur when an animal cleans itself, urine in litter boxes and cages, and feathers can all cause nasal allergies.  Irritants make allergies worse  Although irritants dont cause nasal allergies, they can make allergy symptoms worse. Cigarette smoke, perfume, aerosol sprays, smoke from wood stoves or fireplaces, car exhaust, and strong odors are examples of irritants.   Date Last Reviewed: 9/1/2016  © 9302-4364 [employer]. [address] [place] [address], [place] [address]. All rights reserved. This information is not intended as a substitute for professional medical care. Always follow your healthcare professional's instructions.

## 2020-04-05 NOTE — TELEPHONE ENCOUNTER
Spoke with pt, she would like a virtual visit. She has the [facility] portal and pt downloaded [employer] brian.    She would like a 2pm virtual visit.

## 2020-04-05 NOTE — PROGRESS NOTES
The patient location is: home  The chief complaint leading to consultation is: congestion and ear pressure  Visit type: Virtual visit with synchronous audio and video  Total time spent with patient: 15 minutes  Each patient to whom he or she provides medical services by telemedicine is:  (1) informed of the relationship between the physician and patient and the respective role of any other health care provider with respect to management of the patient; and (2) notified that he or she may decline to receive medical services by telemedicine and may withdraw from such care at any time.    Notes: 64 year old woman who twice a year experiences some nasal congestion with ear pressure.  These symptoms started about 3 days ago.  Not much rhinorrhea or sneezing, but she is experiencing some post-nasal drip.  No antecedent sore throat.  No headache.  No fever or chills.  She has checked her temp several times the past few days, and has not had any fever.  She has been on nasal steroids in the past.  No malaise, no myalgias.  No toothache.  No purulent nasal discharge.  She has some flonase at home.     Exam:  No acute distress    A:  Allergic rhinitis with post-nasal drip -- I suspect she has some eustachian tube dysfunction from congestion that is impeding ear drainage  Plan:  Advised using flonase 2 sprays each nostril twice daily -- one spray directed at back of head while head bent forward, and second spray at ipsilateral ear  Add zyrtec or claritin once daily  Also suggested she could use afrin nasal spray but not for more than 3 days  No evidence of secondary bacterial infection at this point -- she will contact us if she develops any signs or symptoms of secondary infection, especially if symptoms persist beyond 10-14 days

## 2020-05-11 ENCOUNTER — TELEPHONE (OUTPATIENT)
Dept: INTERNAL MEDICINE | Facility: CLINIC | Age: 65
End: 2020-05-11

## 2020-05-11 DIAGNOSIS — Z12.31 ENCOUNTER FOR SCREENING MAMMOGRAM FOR BREAST CANCER: Primary | ICD-10-CM

## 2020-05-11 NOTE — TELEPHONE ENCOUNTER
----- Message from Trini Costa sent at 5/11/2020 10:04 AM CDT -----  Contact: Pt  Reason: Mammo Order Needed     Communication: [phone]

## 2020-05-20 ENCOUNTER — PATIENT MESSAGE (OUTPATIENT)
Dept: INTERNAL MEDICINE | Facility: CLINIC | Age: 65
End: 2020-05-20

## 2020-05-20 DIAGNOSIS — Z00.00 ANNUAL PHYSICAL EXAM: Primary | ICD-10-CM

## 2020-05-20 DIAGNOSIS — E78.5 HYPERLIPIDEMIA, UNSPECIFIED HYPERLIPIDEMIA TYPE: ICD-10-CM

## 2020-05-20 DIAGNOSIS — F41.9 ANXIETY: ICD-10-CM

## 2020-05-20 DIAGNOSIS — E55.9 VITAMIN D DEFICIENCY: ICD-10-CM

## 2020-05-28 ENCOUNTER — LAB VISIT (OUTPATIENT)
Dept: LAB | Facility: HOSPITAL | Age: 65
End: 2020-05-28
Attending: INTERNAL MEDICINE
Payer: MEDICARE

## 2020-05-28 DIAGNOSIS — Z00.00 ANNUAL PHYSICAL EXAM: ICD-10-CM

## 2020-05-28 DIAGNOSIS — E78.5 HYPERLIPIDEMIA, UNSPECIFIED HYPERLIPIDEMIA TYPE: ICD-10-CM

## 2020-05-28 DIAGNOSIS — E55.9 VITAMIN D DEFICIENCY: ICD-10-CM

## 2020-05-28 LAB
25(OH)D3+25(OH)D2 SERPL-MCNC: 41 NG/ML (ref 30–96)
ALBUMIN SERPL BCP-MCNC: 3.9 G/DL (ref 3.5–5.2)
ALP SERPL-CCNC: 66 U/L (ref 55–135)
ALT SERPL W/O P-5'-P-CCNC: 39 U/L (ref 10–44)
ANION GAP SERPL CALC-SCNC: 8 MMOL/L (ref 8–16)
AST SERPL-CCNC: 30 U/L (ref 10–40)
BASOPHILS # BLD AUTO: 0.04 K/UL (ref 0–0.2)
BASOPHILS NFR BLD: 0.5 % (ref 0–1.9)
BILIRUB SERPL-MCNC: 0.5 MG/DL (ref 0.1–1)
BUN SERPL-MCNC: 8 MG/DL (ref 8–23)
CALCIUM SERPL-MCNC: 9.3 MG/DL (ref 8.7–10.5)
CHLORIDE SERPL-SCNC: 101 MMOL/L (ref 95–110)
CHOLEST SERPL-MCNC: 207 MG/DL (ref 120–199)
CHOLEST/HDLC SERPL: 2.3 {RATIO} (ref 2–5)
CO2 SERPL-SCNC: 28 MMOL/L (ref 23–29)
CREAT SERPL-MCNC: 0.7 MG/DL (ref 0.5–1.4)
DIFFERENTIAL METHOD: ABNORMAL
EOSINOPHIL # BLD AUTO: 0.2 K/UL (ref 0–0.5)
EOSINOPHIL NFR BLD: 2.3 % (ref 0–8)
ERYTHROCYTE [DISTWIDTH] IN BLOOD BY AUTOMATED COUNT: 13 % (ref 11.5–14.5)
EST. GFR  (AFRICAN AMERICAN): >60 ML/MIN/1.73 M^2
EST. GFR  (NON AFRICAN AMERICAN): >60 ML/MIN/1.73 M^2
GLUCOSE SERPL-MCNC: 100 MG/DL (ref 70–110)
HCT VFR BLD AUTO: 44 % (ref 37–48.5)
HDLC SERPL-MCNC: 90 MG/DL (ref 40–75)
HDLC SERPL: 43.5 % (ref 20–50)
HGB BLD-MCNC: 14.1 G/DL (ref 12–16)
IMM GRANULOCYTES # BLD AUTO: 0.03 K/UL (ref 0–0.04)
IMM GRANULOCYTES NFR BLD AUTO: 0.4 % (ref 0–0.5)
LDLC SERPL CALC-MCNC: 108.6 MG/DL (ref 63–159)
LYMPHOCYTES # BLD AUTO: 2.2 K/UL (ref 1–4.8)
LYMPHOCYTES NFR BLD: 26.3 % (ref 18–48)
MCH RBC QN AUTO: 31.9 PG (ref 27–31)
MCHC RBC AUTO-ENTMCNC: 32 G/DL (ref 32–36)
MCV RBC AUTO: 100 FL (ref 82–98)
MONOCYTES # BLD AUTO: 0.7 K/UL (ref 0.3–1)
MONOCYTES NFR BLD: 8.6 % (ref 4–15)
NEUTROPHILS # BLD AUTO: 5.2 K/UL (ref 1.8–7.7)
NEUTROPHILS NFR BLD: 61.9 % (ref 38–73)
NONHDLC SERPL-MCNC: 117 MG/DL
NRBC BLD-RTO: 0 /100 WBC
PLATELET # BLD AUTO: 249 K/UL (ref 150–350)
PMV BLD AUTO: 10.2 FL (ref 9.2–12.9)
POTASSIUM SERPL-SCNC: 4.9 MMOL/L (ref 3.5–5.1)
PROT SERPL-MCNC: 6.8 G/DL (ref 6–8.4)
RBC # BLD AUTO: 4.42 M/UL (ref 4–5.4)
SODIUM SERPL-SCNC: 137 MMOL/L (ref 136–145)
TRIGL SERPL-MCNC: 42 MG/DL (ref 30–150)
WBC # BLD AUTO: 8.37 K/UL (ref 3.9–12.7)

## 2020-05-28 PROCEDURE — 80061 LIPID PANEL: CPT

## 2020-05-28 PROCEDURE — 85025 COMPLETE CBC W/AUTO DIFF WBC: CPT

## 2020-05-28 PROCEDURE — 36415 COLL VENOUS BLD VENIPUNCTURE: CPT

## 2020-05-28 PROCEDURE — 80053 COMPREHEN METABOLIC PANEL: CPT

## 2020-05-28 PROCEDURE — 82306 VITAMIN D 25 HYDROXY: CPT

## 2020-06-01 ENCOUNTER — HOSPITAL ENCOUNTER (OUTPATIENT)
Dept: RADIOLOGY | Facility: HOSPITAL | Age: 65
Discharge: HOME | End: 2020-06-01
Attending: INTERNAL MEDICINE
Payer: MEDICARE

## 2020-06-01 ENCOUNTER — OFFICE VISIT (OUTPATIENT)
Dept: INTERNAL MEDICINE | Facility: CLINIC | Age: 65
End: 2020-06-01
Payer: MEDICARE

## 2020-06-01 VITALS
WEIGHT: 175.5 LBS | HEART RATE: 84 BPM | SYSTOLIC BLOOD PRESSURE: 126 MMHG | HEIGHT: 69 IN | DIASTOLIC BLOOD PRESSURE: 82 MMHG | BODY MASS INDEX: 26 KG/M2

## 2020-06-01 DIAGNOSIS — K21.9 GASTROESOPHAGEAL REFLUX DISEASE, ESOPHAGITIS PRESENCE NOT SPECIFIED: ICD-10-CM

## 2020-06-01 DIAGNOSIS — F41.9 ANXIETY: ICD-10-CM

## 2020-06-01 DIAGNOSIS — Z78.0 POSTMENOPAUSAL ESTROGEN DEFICIENCY: ICD-10-CM

## 2020-06-01 DIAGNOSIS — Z12.31 ENCOUNTER FOR SCREENING MAMMOGRAM FOR BREAST CANCER: ICD-10-CM

## 2020-06-01 DIAGNOSIS — Z23 NEED FOR VACCINATION WITH 13-POLYVALENT PNEUMOCOCCAL CONJUGATE VACCINE: ICD-10-CM

## 2020-06-01 DIAGNOSIS — E78.5 HYPERLIPIDEMIA, UNSPECIFIED HYPERLIPIDEMIA TYPE: Primary | ICD-10-CM

## 2020-06-01 DIAGNOSIS — Z72.0 TOBACCO USE: ICD-10-CM

## 2020-06-01 PROCEDURE — 77063 MAMMO DIGITAL SCREENING BILAT WITH TOMOSYNTHESIS_CAD: ICD-10-PCS | Mod: 26,,, | Performed by: RADIOLOGY

## 2020-06-01 PROCEDURE — 99214 OFFICE O/P EST MOD 30 MIN: CPT | Mod: S$PBB,,, | Performed by: INTERNAL MEDICINE

## 2020-06-01 PROCEDURE — 99999 PR PBB SHADOW E&M-EST. PATIENT-LVL IV: CPT | Mod: PBBFAC,,, | Performed by: INTERNAL MEDICINE

## 2020-06-01 PROCEDURE — 77067 MAMMO DIGITAL SCREENING BILAT WITH TOMOSYNTHESIS_CAD: ICD-10-PCS | Mod: 26,,, | Performed by: RADIOLOGY

## 2020-06-01 PROCEDURE — 99999 PR PBB SHADOW E&M-EST. PATIENT-LVL IV: ICD-10-PCS | Mod: PBBFAC,,, | Performed by: INTERNAL MEDICINE

## 2020-06-01 PROCEDURE — 77067 SCR MAMMO BI INCL CAD: CPT | Mod: 26,,, | Performed by: RADIOLOGY

## 2020-06-01 PROCEDURE — 99214 OFFICE O/P EST MOD 30 MIN: CPT | Mod: PBBFAC,25 | Performed by: INTERNAL MEDICINE

## 2020-06-01 PROCEDURE — 99214 PR OFFICE/OUTPT VISIT, EST, LEVL IV, 30-39 MIN: ICD-10-PCS | Mod: S$PBB,,, | Performed by: INTERNAL MEDICINE

## 2020-06-01 PROCEDURE — 77067 SCR MAMMO BI INCL CAD: CPT | Mod: TC

## 2020-06-01 PROCEDURE — G0009 ADMIN PNEUMOCOCCAL VACCINE: HCPCS | Mod: PBBFAC

## 2020-06-01 PROCEDURE — 77063 BREAST TOMOSYNTHESIS BI: CPT | Mod: 26,,, | Performed by: RADIOLOGY

## 2020-06-01 RX ORDER — LORAZEPAM 0.5 MG/1
0.5 TABLET ORAL DAILY PRN
Qty: 30 TABLET | Refills: 1 | Status: SHIPPED | OUTPATIENT
Start: 2020-06-01 | End: 2020-10-06 | Stop reason: SDUPTHER

## 2020-06-01 RX ORDER — LORAZEPAM 0.5 MG/1
0.5 TABLET ORAL DAILY PRN
Qty: 30 TABLET | Refills: 1 | Status: SHIPPED | OUTPATIENT
Start: 2020-06-01 | End: 2020-06-01 | Stop reason: SDUPTHER

## 2020-06-01 NOTE — PROGRESS NOTES
INTERNAL MEDICINE YEARLY VISIT NOTE      CHIEF COMPLAINT     Chief Complaint   Patient presents with    Follow-up       HPI     Karina Thomas is a 65 y.o. C female who presents for yearly.     Rare use of ativan 0.5mg prn. Takes a few times a week.  appropriate. #30 3/11/20.   Mom [facility] at [facility].      HLD - pravastatin 80mg daily. Cholesterol looks good.      GERD nexium 40mg daily.      Chronic smoker.     Past Medical History:  Past Medical History:   Diagnosis Date    Anxiety     GERD (gastroesophageal reflux disease)     GERD (gastroesophageal reflux disease)     Hyperlipidemia        Past Surgical History:  Past Surgical History:   Procedure Laterality Date    APPENDECTOMY      HYSTERECTOMY  1989    TVH ov in situ - due to abnormal Pap smear       Allergies:  Review of patient's allergies indicates:  No Known Allergies    Home Medications:  Prior to Admission medications    Medication Sig Start Date End Date Taking? Authorizing Provider   ascorbate calcium (ZENIA-C ORAL) Take by mouth.   Yes Historical Provider, MD   aspirin 81 MG Chew Take 81 mg by mouth once daily.   Yes Historical Provider, MD   BILBERRY ORAL Take by mouth.   Yes Historical Provider, MD   CHOLINE ORAL Take 500 mg by mouth.   Yes Historical Provider, MD   cyanocobalamin (VITAMIN B-12) 1000 MCG tablet Take 3,000 mcg by mouth once daily.    Yes Historical Provider, MD   esomeprazole (NEXIUM) 40 MG capsule TAKE 1 CAPSULE BY MOUTH EVERY DAY 4/3/20  Yes Isis Ferrell MD   fluticasone propionate (FLONASE) 50 mcg/actuation nasal spray 2 sprays (100 mcg total) by Each Nostril route once daily. 10/24/19  Yes Ruba Mulligan NP   INV folate 800 MCG tablet Take 800 mcg by mouth once daily. Methylfolate   Yes Historical Provider, MD   L. rhamnosus GG/inulin (CULTURELLE PROBIOTICS ORAL) Take by mouth.   Yes Historical Provider, MD   LORazepam (ATIVAN) 0.5 MG tablet TAKE ONE TABLET BY MOUTH EVERY DAY AS NEEDED 11/18/19  Yes Pinky Reyes  MD   milk thistle 175 mg tablet Take 175 mg by mouth once daily.   Yes Historical Provider, MD   pravastatin (PRAVACHOL) 80 MG tablet TAKE 1 TABLET BY MOUTH EVERY DAY 4/3/20  Yes Isis Ferrell MD   ubidecarenone/vitamin E mixed (COQ10  ORAL) Take by mouth.   Yes Historical Provider, MD   vitamin D 1000 units Tab Take 185 mg by mouth once daily.   Yes Historical Provider, MD   amoxicillin-clavulanate 875-125mg (AUGMENTIN) 875-125 mg per tablet Take 1 tablet by mouth 2 (two) times daily.  Patient not taking: Reported on 2/19/2020 10/24/19   Ruba Mulligan NP   omega-3 acid ethyl esters (LOVAZA) 1 gram capsule Take 2 capsules (2 g total) by mouth 2 (two) times daily. 3/13/19 3/12/20  Pinky Reyes MD       Family History:  Family History   Problem Relation Age of Onset    Cancer Father         unknown CA    Alzheimer's disease Mother     Heart disease Brother         cad s/p stent 64    No Known Problems Daughter     No Known Problems Son     Breast cancer Neg Hx     Colon cancer Neg Hx     Ovarian cancer Neg Hx        Social History:  Social History     Tobacco Use    Smoking status: Current Every Day Smoker     Packs/day: 1.00     Years: 25.00     Pack years: 25.00    Smokeless tobacco: Never Used   Substance Use Topics    Alcohol use: Yes     Alcohol/week: 7.0 standard drinks     Types: 7 Glasses of wine per week     Frequency: 4 or more times a week     Drinks per session: 1 or 2     Binge frequency: Patient refused    Drug use: No       Review of Systems:  Review of Systems   Constitutional: Negative for activity change and unexpected weight change.   HENT: Negative for hearing loss, rhinorrhea and trouble swallowing.    Eyes: Negative for discharge and visual disturbance.   Respiratory: Positive for wheezing. Negative for chest tightness.    Cardiovascular: Negative for chest pain and palpitations.   Gastrointestinal: Positive for diarrhea. Negative for blood in stool, constipation and  "vomiting.   Endocrine: Negative for polydipsia and polyuria.   Genitourinary: Negative for difficulty urinating, dysuria, hematuria and menstrual problem.   Musculoskeletal: Positive for arthralgias and neck pain. Negative for joint swelling.   Neurological: Negative for weakness and headaches.   Psychiatric/Behavioral: Negative for confusion and dysphoric mood.       Health Maintainence:   Due for Belle shaw/ Dr. Simmons in Aug 2020.   Has MMG scheduled today.     PHYSICAL EXAM     /82 (BP Location: Left arm, Patient Position: Sitting, BP Method: Large (Manual))   Pulse 84   Ht 5' 9" (1.753 m)   Wt 79.6 kg (175 lb 7.8 oz)   BMI 25.91 kg/m²     GEN - A+OX4, NAD   HEENT - PERRL, EOMI, OP clear. MMM.   Neck - No thyromegaly or cervical LAD. No thyroid masses felt.  CV - RRR, no m/r   Chest - CTAB, no wheezing or rhonchi  Abd - S/NT/ND/+BS.   Ext - 2+BDP and radial pulses. No LE edema.   Neuro - 5/5 BUE and BLE strength. Sensation to light touch intact throughout. 2+ DTRs. Normal gait.   MSK - No spinal tenderness to palpation. Normal gait.   Skin - No rash.    LABS     Previous labs reviewed.    ASSESSMENT/PLAN     Karina Thomas is a 65 y.o. female with  Karina was seen today for follow-up.    Diagnoses and all orders for this visit:    Hyperlipidemia, unspecified hyperlipidemia type - doing well. Cont pravastatin.    Gastroesophageal reflux disease, esophagitis presence not specified - nexium prn.     Anxiety  -     LORazepam (ATIVAN) 0.5 MG tablet; Take 1 tablet (0.5 mg total) by mouth daily as needed.    Tobacco use - not ready to quit. Will let me know when ready.     Postmenopausal estrogen deficiency  -     DXA Bone Density Spine And Hip; Future; Expected date: 06/01/2020    prevnar today.   Scheduled for Belle Simmons in Aug 2020.   Scheduled for MMG today.     RTC in 12 months, sooner if needed and depending on labs.    Pinky Reyes MD  Department of Internal Medicine - [facility]  7:30 " AM

## 2020-06-09 ENCOUNTER — APPOINTMENT (OUTPATIENT)
Dept: RADIOLOGY | Facility: CLINIC | Age: 65
End: 2020-06-09
Attending: INTERNAL MEDICINE
Payer: MEDICARE

## 2020-06-09 DIAGNOSIS — Z78.0 POSTMENOPAUSAL ESTROGEN DEFICIENCY: ICD-10-CM

## 2020-06-09 PROCEDURE — 77080 DEXA BONE DENSITY SPINE HIP: ICD-10-PCS | Mod: 26,,, | Performed by: INTERNAL MEDICINE

## 2020-06-09 PROCEDURE — 77080 DXA BONE DENSITY AXIAL: CPT | Mod: TC,PO

## 2020-06-09 PROCEDURE — 77080 DXA BONE DENSITY AXIAL: CPT | Mod: 26,,, | Performed by: INTERNAL MEDICINE

## 2020-06-29 RX ORDER — ESOMEPRAZOLE MAGNESIUM 40 MG/1
CAPSULE, DELAYED RELEASE ORAL
Qty: 90 CAPSULE | Refills: 0 | Status: SHIPPED | OUTPATIENT
Start: 2020-06-29 | End: 2020-09-01

## 2020-06-29 RX ORDER — PRAVASTATIN SODIUM 80 MG/1
TABLET ORAL
Qty: 90 TABLET | Refills: 0 | Status: SHIPPED | OUTPATIENT
Start: 2020-06-29 | End: 2020-09-01

## 2020-07-07 ENCOUNTER — PATIENT OUTREACH (OUTPATIENT)
Dept: ADMINISTRATIVE | Facility: HOSPITAL | Age: 65
End: 2020-07-07

## 2020-08-20 ENCOUNTER — DOCUMENTATION ONLY (OUTPATIENT)
Dept: INTERNAL MEDICINE | Facility: CLINIC | Age: 65
End: 2020-08-20

## 2020-08-20 NOTE — PROGRESS NOTES
EGD AND COLONOSCOPY RECEIVED FROM DR. SOTO OFFICE   EGD 8/12/20-SMALL SIZE HIATAL HERNIA.  GRADE 1 ESOPHAGITIS WITH REFLUX.  NO BLEEDING.  COLONOSCOPY 08/12/2020-GRADE 1 INTERNAL AND EXTERNAL HEMORRHOIDS.  POLYPS IN TRANSVERSE COLON.  REPEAT IN 5 YEARS.

## 2020-08-21 ENCOUNTER — DOCUMENTATION ONLY (OUTPATIENT)
Dept: INTERNAL MEDICINE | Facility: CLINIC | Age: 65
End: 2020-08-21

## 2020-08-21 NOTE — PROGRESS NOTES
Outside pathology report 08/12/2020-stomach antrum normal and negative for H pylori.  Lower 3rd of esophagus shows chronic gastroesophagitis.  Negative for H pylori.  Tubular adenoma x2 in transverse colon.

## 2020-09-04 ENCOUNTER — PATIENT OUTREACH (OUTPATIENT)
Dept: ADMINISTRATIVE | Facility: HOSPITAL | Age: 65
End: 2020-09-04

## 2020-09-18 ENCOUNTER — OFFICE VISIT (OUTPATIENT)
Dept: INTERNAL MEDICINE | Facility: CLINIC | Age: 65
End: 2020-09-18
Payer: MEDICARE

## 2020-09-18 ENCOUNTER — LAB VISIT (OUTPATIENT)
Dept: LAB | Facility: HOSPITAL | Age: 65
End: 2020-09-18
Payer: MEDICARE

## 2020-09-18 VITALS
HEIGHT: 69 IN | HEART RATE: 88 BPM | OXYGEN SATURATION: 98 % | SYSTOLIC BLOOD PRESSURE: 138 MMHG | WEIGHT: 174.19 LBS | BODY MASS INDEX: 25.8 KG/M2 | DIASTOLIC BLOOD PRESSURE: 70 MMHG

## 2020-09-18 DIAGNOSIS — R14.0 ABDOMINAL DISTENSION: ICD-10-CM

## 2020-09-18 DIAGNOSIS — R10.13 EPIGASTRIC PAIN: ICD-10-CM

## 2020-09-18 DIAGNOSIS — H01.139 ECZEMA OF EYELID, UNSPECIFIED LATERALITY: ICD-10-CM

## 2020-09-18 DIAGNOSIS — K20.90 ESOPHAGITIS: ICD-10-CM

## 2020-09-18 DIAGNOSIS — R10.13 EPIGASTRIC PAIN: Primary | ICD-10-CM

## 2020-09-18 LAB
ALBUMIN SERPL BCP-MCNC: 4.1 G/DL (ref 3.5–5.2)
ALP SERPL-CCNC: 57 U/L (ref 55–135)
ALT SERPL W/O P-5'-P-CCNC: 35 U/L (ref 10–44)
AMYLASE SERPL-CCNC: 42 U/L (ref 20–110)
ANION GAP SERPL CALC-SCNC: 9 MMOL/L (ref 8–16)
AST SERPL-CCNC: 28 U/L (ref 10–40)
BASOPHILS # BLD AUTO: 0.02 K/UL (ref 0–0.2)
BASOPHILS NFR BLD: 0.3 % (ref 0–1.9)
BILIRUB SERPL-MCNC: 0.4 MG/DL (ref 0.1–1)
BUN SERPL-MCNC: 9 MG/DL (ref 8–23)
CALCIUM SERPL-MCNC: 8.9 MG/DL (ref 8.7–10.5)
CHLORIDE SERPL-SCNC: 102 MMOL/L (ref 95–110)
CO2 SERPL-SCNC: 29 MMOL/L (ref 23–29)
CREAT SERPL-MCNC: 0.7 MG/DL (ref 0.5–1.4)
DIFFERENTIAL METHOD: ABNORMAL
EOSINOPHIL # BLD AUTO: 0.1 K/UL (ref 0–0.5)
EOSINOPHIL NFR BLD: 2.2 % (ref 0–8)
ERYTHROCYTE [DISTWIDTH] IN BLOOD BY AUTOMATED COUNT: 12.9 % (ref 11.5–14.5)
EST. GFR  (AFRICAN AMERICAN): >60 ML/MIN/1.73 M^2
EST. GFR  (NON AFRICAN AMERICAN): >60 ML/MIN/1.73 M^2
GLUCOSE SERPL-MCNC: 168 MG/DL (ref 70–110)
HCT VFR BLD AUTO: 43.1 % (ref 37–48.5)
HGB BLD-MCNC: 14.2 G/DL (ref 12–16)
IMM GRANULOCYTES # BLD AUTO: 0.02 K/UL (ref 0–0.04)
IMM GRANULOCYTES NFR BLD AUTO: 0.3 % (ref 0–0.5)
LIPASE SERPL-CCNC: 26 U/L (ref 4–60)
LYMPHOCYTES # BLD AUTO: 2.3 K/UL (ref 1–4.8)
LYMPHOCYTES NFR BLD: 36 % (ref 18–48)
MCH RBC QN AUTO: 32.9 PG (ref 27–31)
MCHC RBC AUTO-ENTMCNC: 32.9 G/DL (ref 32–36)
MCV RBC AUTO: 100 FL (ref 82–98)
MONOCYTES # BLD AUTO: 0.5 K/UL (ref 0.3–1)
MONOCYTES NFR BLD: 8.2 % (ref 4–15)
NEUTROPHILS # BLD AUTO: 3.4 K/UL (ref 1.8–7.7)
NEUTROPHILS NFR BLD: 53 % (ref 38–73)
NRBC BLD-RTO: 0 /100 WBC
PLATELET # BLD AUTO: 294 K/UL (ref 150–350)
PMV BLD AUTO: 9.8 FL (ref 9.2–12.9)
POTASSIUM SERPL-SCNC: 3.7 MMOL/L (ref 3.5–5.1)
PROT SERPL-MCNC: 7 G/DL (ref 6–8.4)
RBC # BLD AUTO: 4.31 M/UL (ref 4–5.4)
SODIUM SERPL-SCNC: 140 MMOL/L (ref 136–145)
WBC # BLD AUTO: 6.34 K/UL (ref 3.9–12.7)

## 2020-09-18 PROCEDURE — 99213 OFFICE O/P EST LOW 20 MIN: CPT | Mod: S$PBB,,, | Performed by: NURSE PRACTITIONER

## 2020-09-18 PROCEDURE — 99215 OFFICE O/P EST HI 40 MIN: CPT | Mod: PBBFAC | Performed by: NURSE PRACTITIONER

## 2020-09-18 PROCEDURE — 82150 ASSAY OF AMYLASE: CPT

## 2020-09-18 PROCEDURE — 99213 PR OFFICE/OUTPT VISIT, EST, LEVL III, 20-29 MIN: ICD-10-PCS | Mod: S$PBB,,, | Performed by: NURSE PRACTITIONER

## 2020-09-18 PROCEDURE — 83690 ASSAY OF LIPASE: CPT

## 2020-09-18 PROCEDURE — 99999 PR PBB SHADOW E&M-EST. PATIENT-LVL V: ICD-10-PCS | Mod: PBBFAC,,, | Performed by: NURSE PRACTITIONER

## 2020-09-18 PROCEDURE — 99999 PR PBB SHADOW E&M-EST. PATIENT-LVL V: CPT | Mod: PBBFAC,,, | Performed by: NURSE PRACTITIONER

## 2020-09-18 PROCEDURE — 85025 COMPLETE CBC W/AUTO DIFF WBC: CPT

## 2020-09-18 PROCEDURE — 36415 COLL VENOUS BLD VENIPUNCTURE: CPT

## 2020-09-18 PROCEDURE — 80053 COMPREHEN METABOLIC PANEL: CPT

## 2020-09-18 RX ORDER — SUCRALFATE 1 G/1
1 TABLET ORAL
Qty: 120 TABLET | Refills: 3 | Status: SHIPPED | OUTPATIENT
Start: 2020-09-18 | End: 2020-10-18

## 2020-09-18 RX ORDER — DESONIDE 0.5 MG/G
CREAM TOPICAL 2 TIMES DAILY
Qty: 60 G | Refills: 0 | Status: SHIPPED | OUTPATIENT
Start: 2020-09-18 | End: 2020-09-22

## 2020-09-18 NOTE — PROGRESS NOTES
INTERNAL MEDICINE URGENT CARE NOTE    CHIEF COMPLAINT     Chief Complaint   Patient presents with    Bloated     x1 month     Abdominal Pain    Dry Skin     eye lids       HPI     Karina Thomas is a 65 y.o. female smoker with HLD, Anxiety, GERD who presents for an urgent visit today.    C/o burning and itching to the eyelids - treated with vaseline     Here with c/o abd pain and bloating x 1 month   C/o discomfort 2/2 bloating and radiates through to the back.   Denies diarrhea, constipation, nausea or vomiting. Denies alternating bw diarrhea and constipation   Taking nexium 40mg daily   Food improves pain - worse when hungry     EGD AND COLONOSCOPY RECEIVED FROM DR. SOTO OFFICE   EGD 8/12/20-SMALL SIZE HIATAL HERNIA.  GRADE 1 ESOPHAGITIS WITH REFLUX.  NO BLEEDING.  COLONOSCOPY 08/12/2020-GRADE 1 INTERNAL AND EXTERNAL HEMORRHOIDS.  POLYPS IN TRANSVERSE COLON.  REPEAT IN 5 YEARS  stomach antrum normal and negative for H pylori.  Lower 3rd of esophagus shows chronic gastroesophagitis.  Negative for H pylori.  Tubular adenoma x2 in transverse colon.      Past Medical History:  Past Medical History:   Diagnosis Date    Anxiety     GERD (gastroesophageal reflux disease)     GERD (gastroesophageal reflux disease)     Hyperlipidemia        Home Medications:  Prior to Admission medications    Medication Sig Start Date End Date Taking? Authorizing Provider   ascorbate calcium (ZENIA-C ORAL) Take by mouth.   Yes Historical Provider   aspirin 81 MG Chew Take 81 mg by mouth once daily.   Yes Historical Provider   BILBERRY ORAL Take by mouth.   Yes Historical Provider   CHOLINE ORAL Take 500 mg by mouth.   Yes Historical Provider   cyanocobalamin (VITAMIN B-12) 1000 MCG tablet Take 3,000 mcg by mouth once daily.    Yes Historical Provider   esomeprazole (NEXIUM) 40 MG capsule TAKE 1 CAPSULE BY MOUTH EVERY DAY 9/1/20  Yes Pinky Reyes MD   fluticasone propionate (FLONASE) 50 mcg/actuation nasal spray 2 sprays (100 mcg  total) by Each Nostril route once daily. 10/24/19  Yes Ruba Mulligan NP   INV folate 800 MCG tablet Take 800 mcg by mouth once daily. Methylfolate   Yes Historical Provider   L. rhamnosus GG/inulin (CULTURELLE PROBIOTICS ORAL) Take by mouth.   Yes Historical Provider   LORazepam (ATIVAN) 0.5 MG tablet Take 1 tablet (0.5 mg total) by mouth daily as needed. 6/1/20  Yes Pinky Reyes MD   milk thistle 175 mg tablet Take 175 mg by mouth once daily.   Yes Historical Provider   pravastatin (PRAVACHOL) 80 MG tablet TAKE 1 TABLET BY MOUTH EVERY DAY 9/1/20  Yes Pinky Reyes MD   ubidecarenone/vitamin E mixed (COQ10  ORAL) Take by mouth.   Yes Historical Provider   vitamin D 1000 units Tab Take 185 mg by mouth once daily.   Yes Historical Provider   omega-3 acid ethyl esters (LOVAZA) 1 gram capsule Take 2 capsules (2 g total) by mouth 2 (two) times daily. 3/13/19 3/12/20  Pinky Reyes MD       Review of Systems:  Review of Systems   Constitutional: Negative for chills, fever and unexpected weight change.   Eyes: Negative for visual disturbance.   Respiratory: Negative for cough, shortness of breath and wheezing.    Cardiovascular: Negative for chest pain and palpitations.   Gastrointestinal: Positive for abdominal distention and abdominal pain. Negative for constipation, diarrhea, nausea and vomiting.   Musculoskeletal: Positive for arthralgias (elbow, hand and back pain assocaited with abd distension ), back pain and myalgias.   Skin: Positive for rash (eyelids ).   Neurological: Negative for dizziness, light-headedness and headaches.       Health Maintainence:   Immunizations:  Health Maintenance       Date Due Completion Date    TETANUS VACCINE 04/16/1973 ---    Shingles Vaccine (2 of 3) 08/10/2016 6/15/2016    Influenza Vaccine (1) 08/01/2020 11/15/2019    HIV Screening 06/01/2026 (Originally 4/16/1970) ---    Mammogram 06/01/2021 6/1/2020    Pneumococcal Vaccine (65+ Low/Medium Risk) (2 of 2 - PPSV23) 12/13/2021 6/1/2020  "   Lipid Panel 05/28/2025 5/28/2020    DEXA SCAN 06/09/2025 6/9/2020    Colorectal Cancer Screening 08/12/2025 8/12/2020    Override on 8/12/2020: Done           PHYSICAL EXAM     BP (!) 142/86 (BP Location: Left arm, Patient Position: Sitting, BP Method: Large (Manual))   Pulse 88   Ht 5' 9" (1.753 m)   Wt 79 kg (174 lb 2.6 oz)   SpO2 98%   BMI 25.72 kg/m²     Physical Exam  Vitals signs reviewed.   Constitutional:       Appearance: She is well-developed.   HENT:      Head: Normocephalic.        Right Ear: External ear normal.      Left Ear: External ear normal.      Nose: Nose normal.      Mouth/Throat:      Pharynx: No oropharyngeal exudate.   Eyes:      Pupils: Pupils are equal, round, and reactive to light.   Neck:      Musculoskeletal: Neck supple.      Thyroid: No thyromegaly.      Vascular: No JVD.      Trachea: No tracheal deviation.   Cardiovascular:      Rate and Rhythm: Normal rate and regular rhythm.      Heart sounds: Normal heart sounds. No murmur. No friction rub. No gallop.    Pulmonary:      Effort: Pulmonary effort is normal. No respiratory distress.      Breath sounds: Normal breath sounds. No wheezing or rales.   Abdominal:      General: Bowel sounds are normal. There is no distension.      Palpations: Abdomen is soft.      Tenderness: There is abdominal tenderness in the right upper quadrant and epigastric area. There is no right CVA tenderness, left CVA tenderness, guarding or rebound. Negative signs include Herzog's sign.      Hernia: No hernia is present.       Musculoskeletal: Normal range of motion.         General: No tenderness.   Lymphadenopathy:      Cervical: No cervical adenopathy.   Skin:     General: Skin is warm and dry.      Findings: No rash.   Neurological:      Mental Status: She is alert and oriented to person, place, and time.   Psychiatric:         Behavior: Behavior normal.         LABS     Lab Results   Component Value Date    HGBA1C 5.1 06/20/2018     CMP  Sodium "   Date Value Ref Range Status   05/28/2020 137 136 - 145 mmol/L Final     Potassium   Date Value Ref Range Status   05/28/2020 4.9 3.5 - 5.1 mmol/L Final     Chloride   Date Value Ref Range Status   05/28/2020 101 95 - 110 mmol/L Final     CO2   Date Value Ref Range Status   05/28/2020 28 23 - 29 mmol/L Final     Glucose   Date Value Ref Range Status   05/28/2020 100 70 - 110 mg/dL Final     BUN, Bld   Date Value Ref Range Status   05/28/2020 8 8 - 23 mg/dL Final     Creatinine   Date Value Ref Range Status   05/28/2020 0.7 0.5 - 1.4 mg/dL Final     Calcium   Date Value Ref Range Status   05/28/2020 9.3 8.7 - 10.5 mg/dL Final     Total Protein   Date Value Ref Range Status   05/28/2020 6.8 6.0 - 8.4 g/dL Final     Albumin   Date Value Ref Range Status   05/28/2020 3.9 3.5 - 5.2 g/dL Final     Total Bilirubin   Date Value Ref Range Status   05/28/2020 0.5 0.1 - 1.0 mg/dL Final     Comment:     For infants and newborns, interpretation of results should be based  on gestational age, weight and in agreement with clinical  observations.  Premature Infant recommended reference ranges:  Up to 24 hours.............<8.0 mg/dL  Up to 48 hours............<12.0 mg/dL  3-5 days..................<15.0 mg/dL  6-29 days.................<15.0 mg/dL       Alkaline Phosphatase   Date Value Ref Range Status   05/28/2020 66 55 - 135 U/L Final     AST   Date Value Ref Range Status   05/28/2020 30 10 - 40 U/L Final     ALT   Date Value Ref Range Status   05/28/2020 39 10 - 44 U/L Final     Anion Gap   Date Value Ref Range Status   05/28/2020 8 8 - 16 mmol/L Final     eGFR if    Date Value Ref Range Status   05/28/2020 >60.0 >60 mL/min/1.73 m^2 Final     eGFR if non    Date Value Ref Range Status   05/28/2020 >60.0 >60 mL/min/1.73 m^2 Final     Comment:     Calculation used to obtain the estimated glomerular filtration  rate (eGFR) is the CKD-EPI equation.        Lab Results   Component Value Date    WBC  8.37 05/28/2020    HGB 14.1 05/28/2020    HCT 44.0 05/28/2020     (H) 05/28/2020     05/28/2020     Lab Results   Component Value Date    CHOL 207 (H) 05/28/2020    CHOL 218 (H) 07/02/2019    CHOL 214 (H) 06/20/2018     Lab Results   Component Value Date    HDL 90 (H) 05/28/2020    HDL 86 (H) 07/02/2019    HDL 83 (H) 06/20/2018     Lab Results   Component Value Date    LDLCALC 108.6 05/28/2020    LDLCALC 116.2 07/02/2019    LDLCALC 121.6 06/20/2018     Lab Results   Component Value Date    TRIG 42 05/28/2020    TRIG 79 07/02/2019    TRIG 47 06/20/2018     Lab Results   Component Value Date    CHOLHDL 43.5 05/28/2020    CHOLHDL 39.4 07/02/2019    CHOLHDL 38.8 06/20/2018     Lab Results   Component Value Date    TSH 1.241 07/02/2019       ASSESSMENT/PLAN     Karina Thomas is a 65 y.o. female     Epigastric pain- will send for labs and u/s. Cont nexium and start carafate   -     Comprehensive metabolic panel; Future; Expected date: 09/18/2020  -     CBC auto differential; Future; Expected date: 09/18/2020  -     Lipase; Future; Expected date: 09/18/2020  -     Amylase; Future; Expected date: 09/18/2020  -     US Abdomen Complete; Future; Expected date: 09/18/2020    Esophagitis  -     sucralfate (CARAFATE) 1 gram tablet; Take 1 tablet (1 g total) by mouth 4 (four) times daily before meals and nightly.  Dispense: 120 tablet; Refill: 3    Abdominal distension  -     Comprehensive metabolic panel; Future; Expected date: 09/18/2020  -     CBC auto differential; Future; Expected date: 09/18/2020  -     Lipase; Future; Expected date: 09/18/2020  -     Amylase; Future; Expected date: 09/18/2020    Eczema of eyelid, unspecified laterality- use emollient and steroid x 2 weeks.   -     desonide (DESOWEN) 0.05 % cream; Apply topically 2 (two) times daily. x2 weeks  Dispense: 60 g; Refill: 0    Follow up with PCP    Patient education provided from [facility]. Patient was counseled on when and how to seek emergent care.        Clara PASCAL, PAIGE, FNP-c   Department of Internal Medicine - [facility]  10:43 AM

## 2020-09-22 ENCOUNTER — TELEPHONE (OUTPATIENT)
Dept: INTERNAL MEDICINE | Facility: CLINIC | Age: 65
End: 2020-09-22

## 2020-09-22 RX ORDER — FLUTICASONE PROPIONATE 0.5 MG/G
CREAM TOPICAL 2 TIMES DAILY
Qty: 30 G | Refills: 0 | Status: SHIPPED | OUTPATIENT
Start: 2020-09-22 | End: 2020-09-22 | Stop reason: SDUPTHER

## 2020-09-22 RX ORDER — FLUTICASONE PROPIONATE 0.5 MG/G
CREAM TOPICAL 2 TIMES DAILY
Qty: 30 G | Refills: 0 | Status: SHIPPED | OUTPATIENT
Start: 2020-09-22 | End: 2022-05-03

## 2020-09-23 ENCOUNTER — HOSPITAL ENCOUNTER (OUTPATIENT)
Dept: RADIOLOGY | Facility: HOSPITAL | Age: 65
Discharge: HOME | End: 2020-09-23
Attending: NURSE PRACTITIONER
Payer: MEDICARE

## 2020-09-23 DIAGNOSIS — R10.13 EPIGASTRIC PAIN: ICD-10-CM

## 2020-09-23 PROCEDURE — 76700 US EXAM ABDOM COMPLETE: CPT | Mod: TC

## 2020-09-23 PROCEDURE — 76700 US EXAM ABDOM COMPLETE: CPT | Mod: 26,,, | Performed by: RADIOLOGY

## 2020-09-23 PROCEDURE — 76700 US ABDOMEN COMPLETE: ICD-10-PCS | Mod: 26,,, | Performed by: RADIOLOGY

## 2020-09-24 ENCOUNTER — TELEPHONE (OUTPATIENT)
Dept: INTERNAL MEDICINE | Facility: CLINIC | Age: 65
End: 2020-09-24

## 2020-09-24 NOTE — TELEPHONE ENCOUNTER
Spoke with the patient - bentyl is helping will start digestive enzymes and decrease raw vegetables and beans.   Will follow

## 2020-10-06 ENCOUNTER — PATIENT MESSAGE (OUTPATIENT)
Dept: INTERNAL MEDICINE | Facility: CLINIC | Age: 65
End: 2020-10-06

## 2020-10-06 DIAGNOSIS — F41.9 ANXIETY: ICD-10-CM

## 2020-10-06 RX ORDER — LORAZEPAM 0.5 MG/1
0.5 TABLET ORAL DAILY PRN
Qty: 30 TABLET | Refills: 1 | Status: SHIPPED | OUTPATIENT
Start: 2020-10-06 | End: 2021-03-08 | Stop reason: SDUPTHER

## 2021-01-09 ENCOUNTER — NURSE TRIAGE (OUTPATIENT)
Dept: ADMINISTRATIVE | Facility: CLINIC | Age: 66
End: 2021-01-09

## 2021-01-09 ENCOUNTER — OFFICE VISIT (OUTPATIENT)
Dept: URGENT CARE | Facility: CLINIC | Age: 66
End: 2021-01-09
Payer: MEDICARE

## 2021-01-09 VITALS
OXYGEN SATURATION: 96 % | HEART RATE: 99 BPM | TEMPERATURE: 98 F | SYSTOLIC BLOOD PRESSURE: 146 MMHG | HEIGHT: 69 IN | WEIGHT: 174 LBS | DIASTOLIC BLOOD PRESSURE: 87 MMHG | BODY MASS INDEX: 25.77 KG/M2

## 2021-01-09 DIAGNOSIS — N39.0 URINARY TRACT INFECTION WITH HEMATURIA, SITE UNSPECIFIED: Primary | ICD-10-CM

## 2021-01-09 DIAGNOSIS — R31.9 URINARY TRACT INFECTION WITH HEMATURIA, SITE UNSPECIFIED: Primary | ICD-10-CM

## 2021-01-09 LAB
BILIRUB UR QL STRIP: NEGATIVE
GLUCOSE UR QL STRIP: NEGATIVE
KETONES UR QL STRIP: NEGATIVE
LEUKOCYTE ESTERASE UR QL STRIP: POSITIVE
PH, POC UA: 5.5 (ref 5–8)
POC BLOOD, URINE: POSITIVE
POC NITRATES, URINE: NEGATIVE
PROT UR QL STRIP: NEGATIVE
SP GR UR STRIP: 1 (ref 1–1.03)
UROBILINOGEN UR STRIP-ACNC: NORMAL (ref 0.1–1.1)

## 2021-01-09 PROCEDURE — 81003 POCT URINALYSIS, DIPSTICK, AUTOMATED, W/O SCOPE: ICD-10-PCS | Mod: QW,S$GLB,, | Performed by: FAMILY MEDICINE

## 2021-01-09 PROCEDURE — 81003 URINALYSIS AUTO W/O SCOPE: CPT | Mod: QW,S$GLB,, | Performed by: FAMILY MEDICINE

## 2021-01-09 PROCEDURE — 99214 OFFICE O/P EST MOD 30 MIN: CPT | Mod: 25,S$GLB,, | Performed by: FAMILY MEDICINE

## 2021-01-09 PROCEDURE — 99214 PR OFFICE/OUTPT VISIT, EST, LEVL IV, 30-39 MIN: ICD-10-PCS | Mod: 25,S$GLB,, | Performed by: FAMILY MEDICINE

## 2021-01-09 RX ORDER — CIPROFLOXACIN 500 MG/1
500 TABLET ORAL 2 TIMES DAILY
Qty: 14 TABLET | Refills: 0 | Status: SHIPPED | OUTPATIENT
Start: 2021-01-09 | End: 2021-01-16

## 2021-03-08 ENCOUNTER — TELEPHONE (OUTPATIENT)
Dept: INTERNAL MEDICINE | Facility: CLINIC | Age: 66
End: 2021-03-08

## 2021-03-08 DIAGNOSIS — F41.9 ANXIETY: ICD-10-CM

## 2021-03-08 RX ORDER — LORAZEPAM 0.5 MG/1
0.5 TABLET ORAL DAILY PRN
Qty: 10 TABLET | Refills: 0 | Status: SHIPPED | OUTPATIENT
Start: 2021-03-08 | End: 2021-03-11 | Stop reason: SDUPTHER

## 2021-03-10 ENCOUNTER — TELEPHONE (OUTPATIENT)
Dept: INTERNAL MEDICINE | Facility: CLINIC | Age: 66
End: 2021-03-10

## 2021-03-11 ENCOUNTER — PATIENT MESSAGE (OUTPATIENT)
Dept: INTERNAL MEDICINE | Facility: CLINIC | Age: 66
End: 2021-03-11

## 2021-03-11 DIAGNOSIS — F41.9 ANXIETY: ICD-10-CM

## 2021-03-11 RX ORDER — LORAZEPAM 0.5 MG/1
0.5 TABLET ORAL DAILY PRN
Qty: 30 TABLET | Refills: 0 | Status: SHIPPED | OUTPATIENT
Start: 2021-03-11 | End: 2021-06-03 | Stop reason: SDUPTHER

## 2021-05-27 ENCOUNTER — TELEPHONE (OUTPATIENT)
Dept: INTERNAL MEDICINE | Facility: CLINIC | Age: 66
End: 2021-05-27

## 2021-05-27 DIAGNOSIS — F41.9 ANXIETY: ICD-10-CM

## 2021-05-27 DIAGNOSIS — Z51.81 ENCOUNTER FOR MONITORING LONG-TERM PROTON PUMP INHIBITOR THERAPY: ICD-10-CM

## 2021-05-27 DIAGNOSIS — Z79.899 ENCOUNTER FOR MONITORING LONG-TERM PROTON PUMP INHIBITOR THERAPY: ICD-10-CM

## 2021-05-27 DIAGNOSIS — E78.5 HYPERLIPIDEMIA, UNSPECIFIED HYPERLIPIDEMIA TYPE: Primary | ICD-10-CM

## 2021-05-27 DIAGNOSIS — R73.9 HYPERGLYCEMIA: ICD-10-CM

## 2021-05-31 ENCOUNTER — LAB VISIT (OUTPATIENT)
Dept: LAB | Facility: HOSPITAL | Age: 66
End: 2021-05-31
Attending: INTERNAL MEDICINE
Payer: MEDICARE

## 2021-05-31 DIAGNOSIS — E78.5 HYPERLIPIDEMIA, UNSPECIFIED HYPERLIPIDEMIA TYPE: ICD-10-CM

## 2021-05-31 DIAGNOSIS — R73.9 HYPERGLYCEMIA: ICD-10-CM

## 2021-05-31 DIAGNOSIS — Z79.899 ENCOUNTER FOR MONITORING LONG-TERM PROTON PUMP INHIBITOR THERAPY: ICD-10-CM

## 2021-05-31 DIAGNOSIS — F41.9 ANXIETY: ICD-10-CM

## 2021-05-31 DIAGNOSIS — Z51.81 ENCOUNTER FOR MONITORING LONG-TERM PROTON PUMP INHIBITOR THERAPY: ICD-10-CM

## 2021-05-31 LAB
ALBUMIN SERPL BCP-MCNC: 4.1 G/DL (ref 3.5–5.2)
ALP SERPL-CCNC: 69 U/L (ref 55–135)
ALT SERPL W/O P-5'-P-CCNC: 33 U/L (ref 10–44)
ANION GAP SERPL CALC-SCNC: 11 MMOL/L (ref 8–16)
AST SERPL-CCNC: 29 U/L (ref 10–40)
BASOPHILS # BLD AUTO: 0.03 K/UL (ref 0–0.2)
BASOPHILS NFR BLD: 0.4 % (ref 0–1.9)
BILIRUB SERPL-MCNC: 0.5 MG/DL (ref 0.1–1)
BUN SERPL-MCNC: 9 MG/DL (ref 8–23)
CALCIUM SERPL-MCNC: 9.5 MG/DL (ref 8.7–10.5)
CHLORIDE SERPL-SCNC: 102 MMOL/L (ref 95–110)
CHOLEST SERPL-MCNC: 213 MG/DL (ref 120–199)
CHOLEST/HDLC SERPL: 2.2 {RATIO} (ref 2–5)
CO2 SERPL-SCNC: 25 MMOL/L (ref 23–29)
CREAT SERPL-MCNC: 0.7 MG/DL (ref 0.5–1.4)
DIFFERENTIAL METHOD: ABNORMAL
EOSINOPHIL # BLD AUTO: 0.2 K/UL (ref 0–0.5)
EOSINOPHIL NFR BLD: 2.8 % (ref 0–8)
ERYTHROCYTE [DISTWIDTH] IN BLOOD BY AUTOMATED COUNT: 12.9 % (ref 11.5–14.5)
EST. GFR  (AFRICAN AMERICAN): >60 ML/MIN/1.73 M^2
EST. GFR  (NON AFRICAN AMERICAN): >60 ML/MIN/1.73 M^2
ESTIMATED AVG GLUCOSE: 97 MG/DL (ref 68–131)
GLUCOSE SERPL-MCNC: 108 MG/DL (ref 70–110)
HBA1C MFR BLD: 5 % (ref 4–5.6)
HCT VFR BLD AUTO: 44.7 % (ref 37–48.5)
HDLC SERPL-MCNC: 98 MG/DL (ref 40–75)
HDLC SERPL: 46 % (ref 20–50)
HGB BLD-MCNC: 14.8 G/DL (ref 12–16)
IMM GRANULOCYTES # BLD AUTO: 0.02 K/UL (ref 0–0.04)
IMM GRANULOCYTES NFR BLD AUTO: 0.3 % (ref 0–0.5)
LDLC SERPL CALC-MCNC: 105.2 MG/DL (ref 63–159)
LYMPHOCYTES # BLD AUTO: 2.7 K/UL (ref 1–4.8)
LYMPHOCYTES NFR BLD: 39.1 % (ref 18–48)
MCH RBC QN AUTO: 32.2 PG (ref 27–31)
MCHC RBC AUTO-ENTMCNC: 33.1 G/DL (ref 32–36)
MCV RBC AUTO: 97 FL (ref 82–98)
MONOCYTES # BLD AUTO: 0.6 K/UL (ref 0.3–1)
MONOCYTES NFR BLD: 9.1 % (ref 4–15)
NEUTROPHILS # BLD AUTO: 3.3 K/UL (ref 1.8–7.7)
NEUTROPHILS NFR BLD: 48.3 % (ref 38–73)
NONHDLC SERPL-MCNC: 115 MG/DL
NRBC BLD-RTO: 0 /100 WBC
PLATELET # BLD AUTO: 287 K/UL (ref 150–450)
PMV BLD AUTO: 9.9 FL (ref 9.2–12.9)
POTASSIUM SERPL-SCNC: 4.7 MMOL/L (ref 3.5–5.1)
PROT SERPL-MCNC: 7.3 G/DL (ref 6–8.4)
RBC # BLD AUTO: 4.59 M/UL (ref 4–5.4)
SODIUM SERPL-SCNC: 138 MMOL/L (ref 136–145)
TRIGL SERPL-MCNC: 49 MG/DL (ref 30–150)
TSH SERPL DL<=0.005 MIU/L-ACNC: 1.45 UIU/ML (ref 0.4–4)
WBC # BLD AUTO: 6.82 K/UL (ref 3.9–12.7)

## 2021-05-31 PROCEDURE — 83036 HEMOGLOBIN GLYCOSYLATED A1C: CPT | Performed by: INTERNAL MEDICINE

## 2021-05-31 PROCEDURE — 80053 COMPREHEN METABOLIC PANEL: CPT | Performed by: INTERNAL MEDICINE

## 2021-05-31 PROCEDURE — 80061 LIPID PANEL: CPT | Performed by: INTERNAL MEDICINE

## 2021-05-31 PROCEDURE — 84443 ASSAY THYROID STIM HORMONE: CPT | Performed by: INTERNAL MEDICINE

## 2021-05-31 PROCEDURE — 85025 COMPLETE CBC W/AUTO DIFF WBC: CPT | Performed by: INTERNAL MEDICINE

## 2021-05-31 PROCEDURE — 36415 COLL VENOUS BLD VENIPUNCTURE: CPT | Performed by: INTERNAL MEDICINE

## 2021-06-03 ENCOUNTER — OFFICE VISIT (OUTPATIENT)
Dept: INTERNAL MEDICINE | Facility: CLINIC | Age: 66
End: 2021-06-03
Payer: MEDICARE

## 2021-06-03 VITALS
SYSTOLIC BLOOD PRESSURE: 135 MMHG | OXYGEN SATURATION: 95 % | DIASTOLIC BLOOD PRESSURE: 82 MMHG | HEIGHT: 69 IN | WEIGHT: 175.94 LBS | HEART RATE: 87 BPM | BODY MASS INDEX: 26.06 KG/M2

## 2021-06-03 DIAGNOSIS — R06.2 WHEEZING: ICD-10-CM

## 2021-06-03 DIAGNOSIS — Z12.31 ENCOUNTER FOR SCREENING MAMMOGRAM FOR MALIGNANT NEOPLASM OF BREAST: ICD-10-CM

## 2021-06-03 DIAGNOSIS — Z72.0 TOBACCO USE: ICD-10-CM

## 2021-06-03 DIAGNOSIS — Z12.2 ENCOUNTER FOR SCREENING FOR LUNG CANCER: ICD-10-CM

## 2021-06-03 DIAGNOSIS — K21.9 GASTROESOPHAGEAL REFLUX DISEASE, UNSPECIFIED WHETHER ESOPHAGITIS PRESENT: ICD-10-CM

## 2021-06-03 DIAGNOSIS — F41.9 ANXIETY: ICD-10-CM

## 2021-06-03 DIAGNOSIS — E78.5 HYPERLIPIDEMIA, UNSPECIFIED HYPERLIPIDEMIA TYPE: Primary | ICD-10-CM

## 2021-06-03 DIAGNOSIS — Z87.891 PERSONAL HISTORY OF NICOTINE DEPENDENCE: ICD-10-CM

## 2021-06-03 PROCEDURE — 99999 PR PBB SHADOW E&M-EST. PATIENT-LVL IV: CPT | Mod: PBBFAC,,, | Performed by: INTERNAL MEDICINE

## 2021-06-03 PROCEDURE — 99214 OFFICE O/P EST MOD 30 MIN: CPT | Mod: PBBFAC | Performed by: INTERNAL MEDICINE

## 2021-06-03 PROCEDURE — 99999 PR PBB SHADOW E&M-EST. PATIENT-LVL IV: ICD-10-PCS | Mod: PBBFAC,,, | Performed by: INTERNAL MEDICINE

## 2021-06-03 PROCEDURE — 99214 PR OFFICE/OUTPT VISIT, EST, LEVL IV, 30-39 MIN: ICD-10-PCS | Mod: S$PBB,,, | Performed by: INTERNAL MEDICINE

## 2021-06-03 PROCEDURE — 99214 OFFICE O/P EST MOD 30 MIN: CPT | Mod: S$PBB,,, | Performed by: INTERNAL MEDICINE

## 2021-06-03 RX ORDER — ALBUTEROL SULFATE 90 UG/1
2 AEROSOL, METERED RESPIRATORY (INHALATION) EVERY 6 HOURS PRN
Qty: 18 G | Refills: 1 | Status: SHIPPED | OUTPATIENT
Start: 2021-06-03 | End: 2021-06-03

## 2021-06-03 RX ORDER — LORAZEPAM 0.5 MG/1
0.5 TABLET ORAL DAILY PRN
Qty: 30 TABLET | Refills: 1 | Status: SHIPPED | OUTPATIENT
Start: 2021-06-03 | End: 2021-09-29

## 2021-06-22 ENCOUNTER — HOSPITAL ENCOUNTER (OUTPATIENT)
Dept: RADIOLOGY | Facility: HOSPITAL | Age: 66
Discharge: HOME | End: 2021-06-22
Attending: INTERNAL MEDICINE
Payer: MEDICARE

## 2021-06-22 VITALS — BODY MASS INDEX: 24.88 KG/M2 | WEIGHT: 168 LBS | HEIGHT: 69 IN

## 2021-06-22 DIAGNOSIS — Z87.891 PERSONAL HISTORY OF NICOTINE DEPENDENCE: ICD-10-CM

## 2021-06-22 DIAGNOSIS — Z12.31 ENCOUNTER FOR SCREENING MAMMOGRAM FOR MALIGNANT NEOPLASM OF BREAST: ICD-10-CM

## 2021-06-22 DIAGNOSIS — Z12.2 ENCOUNTER FOR SCREENING FOR LUNG CANCER: ICD-10-CM

## 2021-06-22 PROCEDURE — 77067 MAMMO DIGITAL SCREENING BILAT WITH TOMO: ICD-10-PCS | Mod: 26,,, | Performed by: RADIOLOGY

## 2021-06-22 PROCEDURE — 77067 SCR MAMMO BI INCL CAD: CPT | Mod: TC

## 2021-06-22 PROCEDURE — 77063 MAMMO DIGITAL SCREENING BILAT WITH TOMO: ICD-10-PCS | Mod: 26,,, | Performed by: RADIOLOGY

## 2021-06-22 PROCEDURE — 77067 SCR MAMMO BI INCL CAD: CPT | Mod: 26,,, | Performed by: RADIOLOGY

## 2021-06-22 PROCEDURE — 71271 CT THORAX LUNG CANCER SCR C-: CPT | Mod: 26,,, | Performed by: RADIOLOGY

## 2021-06-22 PROCEDURE — 77063 BREAST TOMOSYNTHESIS BI: CPT | Mod: 26,,, | Performed by: RADIOLOGY

## 2021-06-22 PROCEDURE — 71271 CT CHEST LUNG SCREENING LOW DOSE: ICD-10-PCS | Mod: 26,,, | Performed by: RADIOLOGY

## 2021-06-22 PROCEDURE — 71271 CT THORAX LUNG CANCER SCR C-: CPT | Mod: TC

## 2021-07-15 ENCOUNTER — PATIENT MESSAGE (OUTPATIENT)
Dept: INTERNAL MEDICINE | Facility: CLINIC | Age: 66
End: 2021-07-15

## 2021-07-15 DIAGNOSIS — N39.0 URINARY TRACT INFECTION WITHOUT HEMATURIA, SITE UNSPECIFIED: Primary | ICD-10-CM

## 2021-07-29 ENCOUNTER — PATIENT MESSAGE (OUTPATIENT)
Dept: INTERNAL MEDICINE | Facility: CLINIC | Age: 66
End: 2021-07-29

## 2021-07-29 ENCOUNTER — LAB VISIT (OUTPATIENT)
Dept: LAB | Facility: HOSPITAL | Age: 66
End: 2021-07-29
Attending: INTERNAL MEDICINE
Payer: MEDICARE

## 2021-07-29 ENCOUNTER — OFFICE VISIT (OUTPATIENT)
Dept: URGENT CARE | Facility: CLINIC | Age: 66
End: 2021-07-29
Payer: MEDICARE

## 2021-07-29 DIAGNOSIS — N39.0 URINARY TRACT INFECTION WITHOUT HEMATURIA, SITE UNSPECIFIED: ICD-10-CM

## 2021-07-29 DIAGNOSIS — R30.0 DYSURIA: Primary | ICD-10-CM

## 2021-07-29 LAB
BILIRUB UR QL STRIP: NEGATIVE
CLARITY UR REFRACT.AUTO: ABNORMAL
COLOR UR AUTO: YELLOW
GLUCOSE UR QL STRIP: NEGATIVE
HGB UR QL STRIP: NEGATIVE
KETONES UR QL STRIP: ABNORMAL
LEUKOCYTE ESTERASE UR QL STRIP: ABNORMAL
MICROSCOPIC COMMENT: ABNORMAL
NITRITE UR QL STRIP: NEGATIVE
PH UR STRIP: 5 [PH] (ref 5–8)
PROT UR QL STRIP: NEGATIVE
RBC #/AREA URNS AUTO: 1 /HPF (ref 0–4)
SP GR UR STRIP: 1.01 (ref 1–1.03)
SQUAMOUS #/AREA URNS AUTO: 1 /HPF
URN SPEC COLLECT METH UR: ABNORMAL
WBC #/AREA URNS AUTO: 69 /HPF (ref 0–5)
WBC CLUMPS UR QL AUTO: ABNORMAL

## 2021-07-29 PROCEDURE — 87086 URINE CULTURE/COLONY COUNT: CPT | Performed by: INTERNAL MEDICINE

## 2021-07-29 PROCEDURE — 87088 URINE BACTERIA CULTURE: CPT | Performed by: INTERNAL MEDICINE

## 2021-07-29 PROCEDURE — 81001 URINALYSIS AUTO W/SCOPE: CPT | Performed by: INTERNAL MEDICINE

## 2021-07-29 PROCEDURE — 87186 SC STD MICRODIL/AGAR DIL: CPT | Performed by: INTERNAL MEDICINE

## 2021-07-29 PROCEDURE — 87077 CULTURE AEROBIC IDENTIFY: CPT | Performed by: INTERNAL MEDICINE

## 2021-07-30 ENCOUNTER — PATIENT MESSAGE (OUTPATIENT)
Dept: INTERNAL MEDICINE | Facility: CLINIC | Age: 66
End: 2021-07-30

## 2021-07-30 RX ORDER — NITROFURANTOIN 25; 75 MG/1; MG/1
100 CAPSULE ORAL 2 TIMES DAILY
Qty: 10 CAPSULE | Refills: 0 | Status: SHIPPED | OUTPATIENT
Start: 2021-07-30 | End: 2021-08-04

## 2021-08-01 LAB — BACTERIA UR CULT: ABNORMAL

## 2021-08-06 ENCOUNTER — LAB VISIT (OUTPATIENT)
Dept: LAB | Facility: HOSPITAL | Age: 66
End: 2021-08-06
Attending: INTERNAL MEDICINE
Payer: MEDICARE

## 2021-08-06 ENCOUNTER — OFFICE VISIT (OUTPATIENT)
Dept: INTERNAL MEDICINE | Facility: CLINIC | Age: 66
End: 2021-08-06
Payer: MEDICARE

## 2021-08-06 VITALS
SYSTOLIC BLOOD PRESSURE: 140 MMHG | OXYGEN SATURATION: 95 % | BODY MASS INDEX: 25.6 KG/M2 | DIASTOLIC BLOOD PRESSURE: 100 MMHG | HEIGHT: 69 IN | WEIGHT: 172.81 LBS | HEART RATE: 98 BPM

## 2021-08-06 DIAGNOSIS — Z79.899 CURRENT USE OF PROTON PUMP INHIBITOR: ICD-10-CM

## 2021-08-06 DIAGNOSIS — M79.10 MYALGIA: ICD-10-CM

## 2021-08-06 DIAGNOSIS — M79.10 MYALGIA: Primary | ICD-10-CM

## 2021-08-06 DIAGNOSIS — E78.5 HYPERLIPIDEMIA, UNSPECIFIED HYPERLIPIDEMIA TYPE: ICD-10-CM

## 2021-08-06 LAB
25(OH)D3+25(OH)D2 SERPL-MCNC: 49 NG/ML (ref 30–96)
CK SERPL-CCNC: 54 U/L (ref 20–180)
ERYTHROCYTE [SEDIMENTATION RATE] IN BLOOD BY WESTERGREN METHOD: 7 MM/HR (ref 0–36)
VIT B12 SERPL-MCNC: >2000 PG/ML (ref 210–950)

## 2021-08-06 PROCEDURE — 82607 VITAMIN B-12: CPT | Performed by: INTERNAL MEDICINE

## 2021-08-06 PROCEDURE — 99214 OFFICE O/P EST MOD 30 MIN: CPT | Mod: PBBFAC | Performed by: INTERNAL MEDICINE

## 2021-08-06 PROCEDURE — 99214 PR OFFICE/OUTPT VISIT, EST, LEVL IV, 30-39 MIN: ICD-10-PCS | Mod: S$PBB,,, | Performed by: INTERNAL MEDICINE

## 2021-08-06 PROCEDURE — 36415 COLL VENOUS BLD VENIPUNCTURE: CPT | Performed by: INTERNAL MEDICINE

## 2021-08-06 PROCEDURE — 82550 ASSAY OF CK (CPK): CPT | Performed by: INTERNAL MEDICINE

## 2021-08-06 PROCEDURE — 99999 PR PBB SHADOW E&M-EST. PATIENT-LVL IV: CPT | Mod: PBBFAC,,, | Performed by: INTERNAL MEDICINE

## 2021-08-06 PROCEDURE — 99214 OFFICE O/P EST MOD 30 MIN: CPT | Mod: S$PBB,,, | Performed by: INTERNAL MEDICINE

## 2021-08-06 PROCEDURE — 85652 RBC SED RATE AUTOMATED: CPT | Performed by: INTERNAL MEDICINE

## 2021-08-06 PROCEDURE — 82306 VITAMIN D 25 HYDROXY: CPT | Performed by: INTERNAL MEDICINE

## 2021-08-06 PROCEDURE — 99999 PR PBB SHADOW E&M-EST. PATIENT-LVL IV: ICD-10-PCS | Mod: PBBFAC,,, | Performed by: INTERNAL MEDICINE

## 2021-08-06 RX ORDER — TIZANIDINE 2 MG/1
4 TABLET ORAL EVERY 8 HOURS PRN
Qty: 60 TABLET | Refills: 2 | Status: SHIPPED | OUTPATIENT
Start: 2021-08-06 | End: 2022-03-03

## 2021-08-06 RX ORDER — PRAVASTATIN SODIUM 40 MG/1
80 TABLET ORAL DAILY
Qty: 90 TABLET | Refills: 1 | Status: SHIPPED | OUTPATIENT
Start: 2021-08-06 | End: 2021-08-16 | Stop reason: SDUPTHER

## 2021-08-16 ENCOUNTER — PATIENT MESSAGE (OUTPATIENT)
Dept: INTERNAL MEDICINE | Facility: CLINIC | Age: 66
End: 2021-08-16

## 2021-08-16 DIAGNOSIS — E78.5 HYPERLIPIDEMIA, UNSPECIFIED HYPERLIPIDEMIA TYPE: ICD-10-CM

## 2021-08-16 RX ORDER — PRAVASTATIN SODIUM 40 MG/1
40 TABLET ORAL DAILY
Qty: 90 TABLET | Refills: 3 | Status: SHIPPED | OUTPATIENT
Start: 2021-08-16 | End: 2022-03-03

## 2021-08-19 ENCOUNTER — PATIENT MESSAGE (OUTPATIENT)
Dept: INTERNAL MEDICINE | Facility: CLINIC | Age: 66
End: 2021-08-19

## 2021-08-19 ENCOUNTER — LAB VISIT (OUTPATIENT)
Dept: LAB | Facility: HOSPITAL | Age: 66
End: 2021-08-19
Attending: INTERNAL MEDICINE
Payer: MEDICARE

## 2021-08-19 ENCOUNTER — TELEPHONE (OUTPATIENT)
Dept: INTERNAL MEDICINE | Facility: CLINIC | Age: 66
End: 2021-08-19

## 2021-08-19 DIAGNOSIS — R31.9 URINARY TRACT INFECTION WITH HEMATURIA, SITE UNSPECIFIED: Primary | ICD-10-CM

## 2021-08-19 DIAGNOSIS — R31.9 URINARY TRACT INFECTION WITH HEMATURIA, SITE UNSPECIFIED: ICD-10-CM

## 2021-08-19 DIAGNOSIS — N39.0 URINARY TRACT INFECTION WITH HEMATURIA, SITE UNSPECIFIED: Primary | ICD-10-CM

## 2021-08-19 DIAGNOSIS — N39.0 URINARY TRACT INFECTION WITH HEMATURIA, SITE UNSPECIFIED: ICD-10-CM

## 2021-08-19 LAB
BACTERIA #/AREA URNS AUTO: ABNORMAL /HPF
BILIRUB UR QL STRIP: NEGATIVE
CLARITY UR REFRACT.AUTO: ABNORMAL
COLOR UR AUTO: YELLOW
GLUCOSE UR QL STRIP: NEGATIVE
HGB UR QL STRIP: ABNORMAL
HYALINE CASTS UR QL AUTO: 0 /LPF
KETONES UR QL STRIP: NEGATIVE
LEUKOCYTE ESTERASE UR QL STRIP: ABNORMAL
MICROSCOPIC COMMENT: ABNORMAL
NITRITE UR QL STRIP: NEGATIVE
PH UR STRIP: 6 [PH] (ref 5–8)
PROT UR QL STRIP: ABNORMAL
RBC #/AREA URNS AUTO: 3 /HPF (ref 0–4)
SP GR UR STRIP: 1 (ref 1–1.03)
SQUAMOUS #/AREA URNS AUTO: 1 /HPF
URN SPEC COLLECT METH UR: ABNORMAL
WBC #/AREA URNS AUTO: 90 /HPF (ref 0–5)
WBC CLUMPS UR QL AUTO: ABNORMAL

## 2021-08-19 PROCEDURE — 87086 URINE CULTURE/COLONY COUNT: CPT | Performed by: INTERNAL MEDICINE

## 2021-08-19 PROCEDURE — 81001 URINALYSIS AUTO W/SCOPE: CPT | Performed by: INTERNAL MEDICINE

## 2021-08-19 RX ORDER — NITROFURANTOIN 25; 75 MG/1; MG/1
100 CAPSULE ORAL 2 TIMES DAILY
Qty: 14 CAPSULE | Refills: 0 | Status: SHIPPED | OUTPATIENT
Start: 2021-08-19 | End: 2021-08-26

## 2021-08-20 LAB — BACTERIA UR CULT: NO GROWTH

## 2022-02-24 ENCOUNTER — TELEPHONE (OUTPATIENT)
Dept: PRIMARY CARE CLINIC | Facility: CLINIC | Age: 67
End: 2022-02-24
Payer: MEDICARE

## 2022-02-24 NOTE — TELEPHONE ENCOUNTER
----- Message -----   From: [facility], System Message   Sent: 2/23/2022  12:07 PM CST   To: [facility] Primary [facility] Clinical Support Staff   Subject: Appointment scheduled from Patient Portal          Appointment For: Karina Thomas ([id number])   Visit Type: MYCHART FOLLOWUP/OFFICE VISIT (2382)      3/14/2022    1:40 PM  40 mins.  Pinky Reyes MD               [facility] PRIMARY CARE      Patient Comments:   Dizziness

## 2022-03-02 ENCOUNTER — PATIENT MESSAGE (OUTPATIENT)
Dept: PRIMARY CARE CLINIC | Facility: CLINIC | Age: 67
End: 2022-03-02
Payer: MEDICARE

## 2022-03-03 ENCOUNTER — OFFICE VISIT (OUTPATIENT)
Dept: PRIMARY CARE CLINIC | Facility: CLINIC | Age: 67
End: 2022-03-03
Payer: MEDICARE

## 2022-03-03 ENCOUNTER — LAB VISIT (OUTPATIENT)
Dept: LAB | Facility: HOSPITAL | Age: 67
End: 2022-03-03
Attending: INTERNAL MEDICINE
Payer: MEDICARE

## 2022-03-03 VITALS
WEIGHT: 166.69 LBS | SYSTOLIC BLOOD PRESSURE: 126 MMHG | HEART RATE: 63 BPM | HEIGHT: 69 IN | TEMPERATURE: 98 F | OXYGEN SATURATION: 98 % | DIASTOLIC BLOOD PRESSURE: 84 MMHG | BODY MASS INDEX: 24.69 KG/M2

## 2022-03-03 DIAGNOSIS — I25.10 ATHEROSCLEROTIC CARDIOVASCULAR DISEASE: ICD-10-CM

## 2022-03-03 DIAGNOSIS — R14.0 BLOATING: Primary | ICD-10-CM

## 2022-03-03 DIAGNOSIS — R20.0 RIGHT LEG NUMBNESS: ICD-10-CM

## 2022-03-03 DIAGNOSIS — R11.0 NAUSEA: ICD-10-CM

## 2022-03-03 DIAGNOSIS — M25.511 CHRONIC RIGHT SHOULDER PAIN: ICD-10-CM

## 2022-03-03 DIAGNOSIS — I70.0 AORTIC ATHEROSCLEROSIS: ICD-10-CM

## 2022-03-03 DIAGNOSIS — G89.29 CHRONIC RIGHT SHOULDER PAIN: ICD-10-CM

## 2022-03-03 DIAGNOSIS — K82.4 GALLBLADDER POLYP: ICD-10-CM

## 2022-03-03 DIAGNOSIS — F17.200 SMOKER: ICD-10-CM

## 2022-03-03 DIAGNOSIS — E78.5 HYPERLIPIDEMIA, UNSPECIFIED HYPERLIPIDEMIA TYPE: ICD-10-CM

## 2022-03-03 DIAGNOSIS — R14.0 BLOATING: ICD-10-CM

## 2022-03-03 DIAGNOSIS — R20.0 RIGHT FACIAL NUMBNESS: ICD-10-CM

## 2022-03-03 LAB
ALBUMIN SERPL BCP-MCNC: 4.1 G/DL (ref 3.5–5.2)
ALP SERPL-CCNC: 55 U/L (ref 55–135)
ALT SERPL W/O P-5'-P-CCNC: 29 U/L (ref 10–44)
ANION GAP SERPL CALC-SCNC: 14 MMOL/L (ref 8–16)
AST SERPL-CCNC: 26 U/L (ref 10–40)
BASOPHILS # BLD AUTO: 0.02 K/UL (ref 0–0.2)
BASOPHILS NFR BLD: 0.3 % (ref 0–1.9)
BILIRUB SERPL-MCNC: 0.5 MG/DL (ref 0.1–1)
BUN SERPL-MCNC: 8 MG/DL (ref 8–23)
CALCIUM SERPL-MCNC: 9.7 MG/DL (ref 8.7–10.5)
CHLORIDE SERPL-SCNC: 101 MMOL/L (ref 95–110)
CO2 SERPL-SCNC: 26 MMOL/L (ref 23–29)
CREAT SERPL-MCNC: 0.6 MG/DL (ref 0.5–1.4)
CRP SERPL-MCNC: 0.9 MG/L (ref 0–8.2)
DIFFERENTIAL METHOD: ABNORMAL
EOSINOPHIL # BLD AUTO: 0.1 K/UL (ref 0–0.5)
EOSINOPHIL NFR BLD: 1.7 % (ref 0–8)
ERYTHROCYTE [DISTWIDTH] IN BLOOD BY AUTOMATED COUNT: 12.7 % (ref 11.5–14.5)
ERYTHROCYTE [SEDIMENTATION RATE] IN BLOOD BY WESTERGREN METHOD: <2 MM/HR (ref 0–36)
EST. GFR  (AFRICAN AMERICAN): >60 ML/MIN/1.73 M^2
EST. GFR  (NON AFRICAN AMERICAN): >60 ML/MIN/1.73 M^2
GLUCOSE SERPL-MCNC: 103 MG/DL (ref 70–110)
HCT VFR BLD AUTO: 44.4 % (ref 37–48.5)
HGB BLD-MCNC: 15.1 G/DL (ref 12–16)
IMM GRANULOCYTES # BLD AUTO: 0.02 K/UL (ref 0–0.04)
IMM GRANULOCYTES NFR BLD AUTO: 0.3 % (ref 0–0.5)
LIPASE SERPL-CCNC: 26 U/L (ref 4–60)
LYMPHOCYTES # BLD AUTO: 2.8 K/UL (ref 1–4.8)
LYMPHOCYTES NFR BLD: 35.5 % (ref 18–48)
MCH RBC QN AUTO: 33.1 PG (ref 27–31)
MCHC RBC AUTO-ENTMCNC: 34 G/DL (ref 32–36)
MCV RBC AUTO: 97 FL (ref 82–98)
MONOCYTES # BLD AUTO: 0.6 K/UL (ref 0.3–1)
MONOCYTES NFR BLD: 8 % (ref 4–15)
NEUTROPHILS # BLD AUTO: 4.3 K/UL (ref 1.8–7.7)
NEUTROPHILS NFR BLD: 54.2 % (ref 38–73)
NRBC BLD-RTO: 0 /100 WBC
PLATELET # BLD AUTO: 301 K/UL (ref 150–450)
PMV BLD AUTO: 10 FL (ref 9.2–12.9)
POTASSIUM SERPL-SCNC: 4.1 MMOL/L (ref 3.5–5.1)
PROT SERPL-MCNC: 7.2 G/DL (ref 6–8.4)
RBC # BLD AUTO: 4.56 M/UL (ref 4–5.4)
SODIUM SERPL-SCNC: 141 MMOL/L (ref 136–145)
WBC # BLD AUTO: 7.86 K/UL (ref 3.9–12.7)

## 2022-03-03 PROCEDURE — 80053 COMPREHEN METABOLIC PANEL: CPT | Performed by: INTERNAL MEDICINE

## 2022-03-03 PROCEDURE — 36415 COLL VENOUS BLD VENIPUNCTURE: CPT | Mod: PN | Performed by: INTERNAL MEDICINE

## 2022-03-03 PROCEDURE — 99214 PR OFFICE/OUTPT VISIT, EST, LEVL IV, 30-39 MIN: ICD-10-PCS | Mod: S$PBB,,, | Performed by: INTERNAL MEDICINE

## 2022-03-03 PROCEDURE — 86677 HELICOBACTER PYLORI ANTIBODY: CPT | Performed by: INTERNAL MEDICINE

## 2022-03-03 PROCEDURE — 85025 COMPLETE CBC W/AUTO DIFF WBC: CPT | Performed by: INTERNAL MEDICINE

## 2022-03-03 PROCEDURE — 99999 PR PBB SHADOW E&M-EST. PATIENT-LVL V: ICD-10-PCS | Mod: PBBFAC,,, | Performed by: INTERNAL MEDICINE

## 2022-03-03 PROCEDURE — 99214 OFFICE O/P EST MOD 30 MIN: CPT | Mod: S$PBB,,, | Performed by: INTERNAL MEDICINE

## 2022-03-03 PROCEDURE — 99999 PR PBB SHADOW E&M-EST. PATIENT-LVL V: CPT | Mod: PBBFAC,,, | Performed by: INTERNAL MEDICINE

## 2022-03-03 PROCEDURE — 83690 ASSAY OF LIPASE: CPT | Performed by: INTERNAL MEDICINE

## 2022-03-03 PROCEDURE — 85652 RBC SED RATE AUTOMATED: CPT | Performed by: INTERNAL MEDICINE

## 2022-03-03 PROCEDURE — 99215 OFFICE O/P EST HI 40 MIN: CPT | Mod: PBBFAC,PN | Performed by: INTERNAL MEDICINE

## 2022-03-03 PROCEDURE — 86140 C-REACTIVE PROTEIN: CPT | Performed by: INTERNAL MEDICINE

## 2022-03-03 RX ORDER — PRAVASTATIN SODIUM 80 MG/1
80 TABLET ORAL DAILY
Qty: 90 TABLET | Refills: 3 | Status: SHIPPED | OUTPATIENT
Start: 2022-03-03 | End: 2023-03-16 | Stop reason: SDUPTHER

## 2022-03-03 NOTE — PROGRESS NOTES
Subjective:       Patient ID: Karina Thomas is a 66 y.o. female.    Chief Complaint: nausea    HPI   JANNETTE Phillips passed away in January after being in hospice. That was stressful. Has a daughter who is bipolar.    2-3 days a week, would wake up w/ nausea. Takes Pepto Bismol and a little while later, will feel ok. This has been present for the last few months. No vomiting. Sometimes feels bloating in the epigastric area and no true pain. Hasn't associated w/ foods. No diarrhea. Sometimes w/ constipation - resolves w/ sennakote.   When she gets the nausea, she does somestimes also have the R mid back pain.  Had Cscope 2020.   Takes esomeprazole 40mg qam - consistently.   Had EGD w/ Dr. Simmons 9/2020 - small hiatal hernia and grade A mild esophagitis of distal esophagus.   Had Cscope at same time. Polyps and hemorrhoids. F/u in 5 yrs.    R mid back w/ pain about a few mo ago about the same time as nausea  - feels like someone had knee there.   Intermittent and is present almost every day. Sometimes worse if she lays back on the couch. However no pain when lying down. If  rubs the back, may help.     R shoulder pain - some days can barely lift it due to pain. Right now feels fine. Sometimes w/ R elbow pain. Worse in the morning times - and improves at the rest of the day. No stiffness or swelling.     Feels like R leg w/ fluid in it (feels tingly). But doesn't actually have swelling. No pain. occ may feel some weakness - no falls and doesn't give out on her. Every now and then R lip may feel a little tingling and then goes away. Doesn't have it on the R arm. No rash. No symptoms on the left side.   Felt this way yesterday but not today.     She was in the backyard watering her plants and all of a sudden she felt dizzy (lightheaded) for a second and then it went away on its own. Not sure if assoc w/ any heart symptoms. Had to go and hold onto something. This has happened a few times in the last few months.  "This is resolved now. No rhinorrhea/congestion/tinnitus/hearing loss.   No HA.     Still smoking 1/2 ppd. No ready to quit. No SOB/TRUJILLO.   Very active on a regular basis - cooks, clean, works in the yard, mops, etc. No issues while doing any of those things.     CT chest lung screening 6/22/21:  Aorta and vasculature: Atherosclerosis including moderate coronary arteries.   Lungs: A 5 mm subpleural nodule is present in the right lower lobe (series 3, image 367). A 2 mm nodule is present in the left lower lobe (series 3, image 229).  Repeat annual in a yr.  On pravastatin 40mg daily (previously on 80mg). .2. . HDL 98.    US of abdomen 9/23/20 - Gallbladder: Two 2 mm adherent gallstones versus gallbladder wall polyps    Review of Systems  Comprehensive review of systems otherwise negative. See history/subjective section for more details.    Objective:      Physical Exam    /84 (BP Location: Left arm, Patient Position: Sitting, BP Method: Large (Manual))   Pulse 63   Temp 98.4 °F (36.9 °C) (Oral)   Ht 5' 9" (1.753 m)   Wt 75.6 kg (166 lb 10.7 oz)   SpO2 98%   BMI 24.61 kg/m²     GEN - A+OX4, NAD   HEENT - PERRL, EOMI, OP clear. MMM.   Neck - No thyromegaly or cervical LAD. No thyroid masses felt.  CV - RRR, no m/r   Chest - CTAB, no wheezing or rhonchi  Abd - S/NT/ND/+BS.   Ext - 2+BDP and radial pulses. No C/C/E.  Neuro - PERRL, EOMI, no nystagmus, eyebrow raise, facial sensation, hearing, m of mastication, smile, palatal raise, shoulder shrug, tongue protrusion symmetric and intact.  5/5 BUE and BLE strength. Sensation to light touch intact throughout. 2+ DTRs. Normal gait.   MSK - no spinal tenderness to palpation.   Skin - No rash.    Previous labs reviewed.     Assessment/Plan     Diagnoses and all orders for this visit:    Bloating  -     US Abdomen Complete; Future  -     H. PYLORI ANTIBODY, IGG; Future  -     Comprehensive Metabolic Panel; Future  -     Lipase; Future  -     CBC Auto " Differential; Future    Chronic right shoulder pain  -     US Abdomen Complete; Future  -     H. PYLORI ANTIBODY, IGG; Future  -     Comprehensive Metabolic Panel; Future  -     Lipase; Future  -     CBC Auto Differential; Future  -     Sedimentation rate; Future  -     C-Reactive Protein; Future  -     Ambulatory referral/consult to Physical/Occupational Therapy; Future    Nausea  -     US Abdomen Complete; Future  -     H. PYLORI ANTIBODY, IGG; Future  -     Comprehensive Metabolic Panel; Future  -     Lipase; Future  -     CBC Auto Differential; Future    Aortic atherosclerosis  -     pravastatin (PRAVACHOL) 80 MG tablet; Take 1 tablet (80 mg total) by mouth once daily.    Right leg numbness  -     CT Head Without Contrast; Future  -     CTA Head and Neck (xpd); Future    Right facial numbness  -     CT Head Without Contrast; Future  -     CTA Head and Neck (xpd); Future    Atherosclerotic cardiovascular disease  -     Stress Echo Which stress agent will be used? Pharmacological; Color Flow Doppler? No; Future  -     pravastatin (PRAVACHOL) 80 MG tablet; Take 1 tablet (80 mg total) by mouth once daily.    Smoker - not ready to quit.   -     Stress Echo Which stress agent will be used? Pharmacological; Color Flow Doppler? No; Future    Hyperlipidemia, unspecified hyperlipidemia type  -     pravastatin (PRAVACHOL) 80 MG tablet; Take 1 tablet (80 mg total) by mouth once daily.    Gallbladder polyp  -     US Abdomen Complete; Future      Follow up in about 8 weeks (around 4/28/2022).      Pinky Reyes MD  Department of Internal Medicine - [facility]  11:16 AM

## 2022-03-04 LAB — H PYLORI IGG SERPL QL IA: NEGATIVE

## 2022-03-09 ENCOUNTER — HOSPITAL ENCOUNTER (OUTPATIENT)
Dept: CARDIOLOGY | Facility: HOSPITAL | Age: 67
Discharge: HOME | End: 2022-03-09
Attending: INTERNAL MEDICINE
Payer: MEDICARE

## 2022-03-09 VITALS — RESPIRATION RATE: 18 BRPM | WEIGHT: 165 LBS | HEIGHT: 69 IN | BODY MASS INDEX: 24.44 KG/M2

## 2022-03-09 DIAGNOSIS — I25.10 ATHEROSCLEROTIC CARDIOVASCULAR DISEASE: ICD-10-CM

## 2022-03-09 DIAGNOSIS — F17.200 SMOKER: ICD-10-CM

## 2022-03-09 LAB
ASCENDING AORTA: 2.96 CM
AV INDEX (PROSTH): 0.81
AV MEAN GRADIENT: 4 MMHG
AV PEAK GRADIENT: 7 MMHG
AV VALVE AREA: 3.09 CM2
AV VELOCITY RATIO: 0.85
BSA FOR ECHO PROCEDURE: 1.91 M2
CV ECHO LV RWT: 0.35 CM
CV STRESS BASE HR: 101 BPM
DIASTOLIC BLOOD PRESSURE: 99 MMHG
DOP CALC AO PEAK VEL: 1.36 M/S
DOP CALC AO VTI: 25.65 CM
DOP CALC LVOT AREA: 3.8 CM2
DOP CALC LVOT DIAMETER: 2.2 CM
DOP CALC LVOT PEAK VEL: 1.15 M/S
DOP CALC LVOT STROKE VOLUME: 79.14 CM3
DOP CALCLVOT PEAK VEL VTI: 20.83 CM
E WAVE DECELERATION TIME: 209 MSEC
E/A RATIO: 0.58
E/E' RATIO: 7.27 M/S
ECHO LV POSTERIOR WALL: 0.84 CM (ref 0.6–1.1)
EJECTION FRACTION: 60 %
FRACTIONAL SHORTENING: 31 % (ref 28–44)
INTERVENTRICULAR SEPTUM: 0.82 CM (ref 0.6–1.1)
IVRT: 105.61 MSEC
LA MAJOR: 5.42 CM
LA MINOR: 4.94 CM
LA WIDTH: 3.42 CM
LEFT ATRIUM SIZE: 3.95 CM
LEFT ATRIUM VOLUME INDEX MOD: 25.2 ML/M2
LEFT ATRIUM VOLUME INDEX: 31.2 ML/M2
LEFT ATRIUM VOLUME MOD: 47.93 CM3
LEFT ATRIUM VOLUME: 59.35 CM3
LEFT INTERNAL DIMENSION IN SYSTOLE: 3.25 CM (ref 2.1–4)
LEFT VENTRICLE DIASTOLIC VOLUME INDEX: 54.86 ML/M2
LEFT VENTRICLE DIASTOLIC VOLUME: 104.24 ML
LEFT VENTRICLE MASS INDEX: 68 G/M2
LEFT VENTRICLE SYSTOLIC VOLUME INDEX: 22.5 ML/M2
LEFT VENTRICLE SYSTOLIC VOLUME: 42.68 ML
LEFT VENTRICULAR INTERNAL DIMENSION IN DIASTOLE: 4.74 CM (ref 3.5–6)
LEFT VENTRICULAR MASS: 130.1 G
LV LATERAL E/E' RATIO: 6.67 M/S
LV SEPTAL E/E' RATIO: 8 M/S
MV A" WAVE DURATION": 9.42 MSEC
MV PEAK A VEL: 0.69 M/S
MV PEAK E VEL: 0.4 M/S
MV STENOSIS PRESSURE HALF TIME: 60.61 MS
MV VALVE AREA P 1/2 METHOD: 3.63 CM2
OHS CV CPX 1 MINUTE RECOVERY HEART RATE: 136 BPM
OHS CV CPX 85 PERCENT MAX PREDICTED HEART RATE MALE: 126
OHS CV CPX ESTIMATED METS: 3
OHS CV CPX MAX PREDICTED HEART RATE: 148
OHS CV CPX PATIENT IS FEMALE: 1
OHS CV CPX PATIENT IS MALE: 0
OHS CV CPX PEAK DIASTOLIC BLOOD PRESSURE: 114 MMHG
OHS CV CPX PEAK HEAR RATE: 141 BPM
OHS CV CPX PEAK RATE PRESSURE PRODUCT: NORMAL
OHS CV CPX PEAK SYSTOLIC BLOOD PRESSURE: 210 MMHG
OHS CV CPX PERCENT MAX PREDICTED HEART RATE ACHIEVED: 95
OHS CV CPX RATE PRESSURE PRODUCT PRESENTING: NORMAL
PISA TR MAX VEL: 2.16 M/S
PULM VEIN S/D RATIO: 1.79
PV PEAK D VEL: 0.38 M/S
PV PEAK S VEL: 0.68 M/S
RA MAJOR: 5.28 CM
RA PRESSURE: 3 MMHG
RA WIDTH: 3.99 CM
RIGHT VENTRICULAR END-DIASTOLIC DIMENSION: 4.18 CM
RV TISSUE DOPPLER FREE WALL SYSTOLIC VELOCITY 1 (APICAL 4 CHAMBER VIEW): 16.92 CM/S
SINUS: 3.52 CM
STJ: 2.95 CM
STRESS ECHO POST EXERCISE DUR MIN: 2 MINUTES
STRESS ECHO POST EXERCISE DUR SEC: 44 SECONDS
STRESS ST DEPRESSION: 1 MM
SYSTOLIC BLOOD PRESSURE: 179 MMHG
TDI LATERAL: 0.06 M/S
TDI SEPTAL: 0.05 M/S
TDI: 0.06 M/S
TR MAX PG: 19 MMHG
TRICUSPID ANNULAR PLANE SYSTOLIC EXCURSION: 2.05 CM
TV REST PULMONARY ARTERY PRESSURE: 22 MMHG

## 2022-03-09 PROCEDURE — 93351 STRESS TTE COMPLETE: CPT

## 2022-03-09 PROCEDURE — 93351 STRESS ECHO (CUPID ONLY): ICD-10-PCS | Mod: 26,,, | Performed by: INTERNAL MEDICINE

## 2022-03-09 PROCEDURE — 93351 STRESS TTE COMPLETE: CPT | Mod: 26,,, | Performed by: INTERNAL MEDICINE

## 2022-03-15 ENCOUNTER — HOSPITAL ENCOUNTER (OUTPATIENT)
Dept: RADIOLOGY | Facility: HOSPITAL | Age: 67
Discharge: HOME | End: 2022-03-15
Attending: INTERNAL MEDICINE
Payer: MEDICARE

## 2022-03-15 ENCOUNTER — TELEPHONE (OUTPATIENT)
Dept: PRIMARY CARE CLINIC | Facility: CLINIC | Age: 67
End: 2022-03-15
Payer: MEDICARE

## 2022-03-15 DIAGNOSIS — K82.4 GALLBLADDER POLYP: ICD-10-CM

## 2022-03-15 DIAGNOSIS — R20.0 RIGHT LEG NUMBNESS: ICD-10-CM

## 2022-03-15 DIAGNOSIS — G89.29 CHRONIC RIGHT SHOULDER PAIN: ICD-10-CM

## 2022-03-15 DIAGNOSIS — M25.511 CHRONIC RIGHT SHOULDER PAIN: ICD-10-CM

## 2022-03-15 DIAGNOSIS — R14.0 BLOATING: ICD-10-CM

## 2022-03-15 DIAGNOSIS — R20.0 RIGHT FACIAL NUMBNESS: ICD-10-CM

## 2022-03-15 DIAGNOSIS — R11.0 NAUSEA: ICD-10-CM

## 2022-03-15 PROCEDURE — 76700 US EXAM ABDOM COMPLETE: CPT | Mod: 26,,, | Performed by: RADIOLOGY

## 2022-03-15 PROCEDURE — 70498 CTA HEAD AND NECK (XPD): ICD-10-PCS | Mod: 26,,, | Performed by: RADIOLOGY

## 2022-03-15 PROCEDURE — 70496 CTA HEAD AND NECK (XPD): ICD-10-PCS | Mod: 26,,, | Performed by: RADIOLOGY

## 2022-03-15 PROCEDURE — 70496 CT ANGIOGRAPHY HEAD: CPT | Mod: 26,,, | Performed by: RADIOLOGY

## 2022-03-15 PROCEDURE — 76700 US EXAM ABDOM COMPLETE: CPT | Mod: TC

## 2022-03-15 PROCEDURE — 70498 CT ANGIOGRAPHY NECK: CPT | Mod: 26,,, | Performed by: RADIOLOGY

## 2022-03-15 PROCEDURE — 25500020 PHARM REV CODE 255: Performed by: INTERNAL MEDICINE

## 2022-03-15 PROCEDURE — 70496 CT ANGIOGRAPHY HEAD: CPT | Mod: TC

## 2022-03-15 PROCEDURE — 76700 US ABDOMEN COMPLETE: ICD-10-PCS | Mod: 26,,, | Performed by: RADIOLOGY

## 2022-03-15 RX ADMIN — IOHEXOL 100 ML: 350 INJECTION, SOLUTION INTRAVENOUS at 06:03

## 2022-03-15 NOTE — TELEPHONE ENCOUNTER
Please call and notify pt:    Abdominal ultrasound w/ 2mm either adherent gallstone vs gallbladder polyp. Gallstones can sometimes cause nausea associated with the right back pain. Plan and follow up as discussed.  CTA of the head and neck does not show any acute pathology but there is some plaques in the carotids. No high grade blockages. Definitely agree with pravastatin 80 instead of the 40mg daily.

## 2022-03-15 NOTE — TELEPHONE ENCOUNTER
Patient notified of Abd/ US & CTA  results. She will let us know of any increasing back pain or nausea. She is already taking pravastatin  80mg as discussed in clinic.

## 2022-04-29 ENCOUNTER — PATIENT MESSAGE (OUTPATIENT)
Dept: PRIMARY CARE CLINIC | Facility: CLINIC | Age: 67
End: 2022-04-29
Payer: MEDICARE

## 2022-04-29 RX ORDER — AMOXICILLIN 500 MG/1
500 TABLET, FILM COATED ORAL EVERY 12 HOURS
Qty: 14 TABLET | Refills: 0 | Status: SHIPPED | OUTPATIENT
Start: 2022-04-29 | End: 2022-05-06

## 2022-05-03 ENCOUNTER — OFFICE VISIT (OUTPATIENT)
Dept: PRIMARY CARE CLINIC | Facility: CLINIC | Age: 67
End: 2022-05-03
Payer: MEDICARE

## 2022-05-03 VITALS
HEART RATE: 91 BPM | TEMPERATURE: 99 F | HEIGHT: 69 IN | BODY MASS INDEX: 25.18 KG/M2 | WEIGHT: 170 LBS | DIASTOLIC BLOOD PRESSURE: 68 MMHG | OXYGEN SATURATION: 98 % | SYSTOLIC BLOOD PRESSURE: 124 MMHG

## 2022-05-03 DIAGNOSIS — K21.9 GASTROESOPHAGEAL REFLUX DISEASE, UNSPECIFIED WHETHER ESOPHAGITIS PRESENT: Primary | ICD-10-CM

## 2022-05-03 DIAGNOSIS — K82.4 GALLBLADDER POLYP: ICD-10-CM

## 2022-05-03 DIAGNOSIS — K05.10 GINGIVITIS: ICD-10-CM

## 2022-05-03 PROCEDURE — 99214 OFFICE O/P EST MOD 30 MIN: CPT | Mod: PBBFAC,PN | Performed by: INTERNAL MEDICINE

## 2022-05-03 PROCEDURE — 99999 PR PBB SHADOW E&M-EST. PATIENT-LVL IV: ICD-10-PCS | Mod: PBBFAC,,, | Performed by: INTERNAL MEDICINE

## 2022-05-03 PROCEDURE — 99999 PR PBB SHADOW E&M-EST. PATIENT-LVL IV: CPT | Mod: PBBFAC,,, | Performed by: INTERNAL MEDICINE

## 2022-05-03 PROCEDURE — 99214 PR OFFICE/OUTPT VISIT, EST, LEVL IV, 30-39 MIN: ICD-10-PCS | Mod: S$PBB,,, | Performed by: INTERNAL MEDICINE

## 2022-05-03 PROCEDURE — 99214 OFFICE O/P EST MOD 30 MIN: CPT | Mod: S$PBB,,, | Performed by: INTERNAL MEDICINE

## 2022-05-03 NOTE — PROGRESS NOTES
"Subjective:       Patient ID: Karina Thomas is a 67 y.o. female.    Chief Complaint: Follow-up    HPI   Since our last visit 3/3/22, reports still occ w/ nausea and takes pepto bismol about 3 days a week. May still wake up w/ some mid back pain occ. Feels like sore as if she worked out. Once she gets up and moving it improves. Mops twice a week. Cook every day. Vacuums 3x/week.   Dizziness is resolved.     Gingival infection. Started on amoxil Friday. Resolved now. No pain.     Reviewed imaging. Gallbladder polyp stable. Stress neg.     Getting ACP docs drawn up w/  friend. Will provide copy when it's done.     Review of Systems  Comprehensive review of systems otherwise negative. See history/subjective section for more details.    Objective:      Physical Exam    /68 (BP Location: Left arm, Patient Position: Sitting, BP Method: Large (Manual))   Pulse 91   Temp 98.5 °F (36.9 °C) (Oral)   Ht 5' 9" (1.753 m)   Wt 77.1 kg (169 lb 15.6 oz)   SpO2 98%   BMI 25.10 kg/m²     GEN - A+OX4, NAD   HEENT - PERRL, EOMI, OP clear. MMM.   Neck - No thyromegaly or cervical LAD. No thyroid masses felt.  CV - RRR, no m/r   Chest - CTAB, no wheezing or rhonchi  Abd - S/NT/ND/+BS.   Ext - 2+BDP and radial pulses. No C/C/E.  Neuro - 5/5 BUE and BLE strength.  Skin - No rash.    PREVIOUS LABS/IMAGING REVIEWED.     Assessment/Plan     Karina was seen today for follow-up.    Diagnoses and all orders for this visit:    Gastroesophageal reflux disease, unspecified whether esophagitis present - occ pepto bismol.     Gingivitis - finish course of amoxil.    Gallbladder polyp - repeat us in a yr.     Will check about ACP docs in a mo.     Follow up if symptoms worsen or fail to improve.      Pinky Reyes MD  Department of Internal Medicine - [facility]  11:22 AM    "

## 2022-06-06 ENCOUNTER — TELEPHONE (OUTPATIENT)
Dept: PRIMARY CARE CLINIC | Facility: CLINIC | Age: 67
End: 2022-06-06
Payer: MEDICARE

## 2022-06-06 NOTE — TELEPHONE ENCOUNTER
----- Message from Pinky Reyes MD sent at 5/3/2022 11:45 AM CDT -----  Regarding: check on ACP documents  Elizabeth, do you mind checking in w/ pt to see if she has her ACP docs yet? She's getting it drawn up w/  soon.

## 2022-06-20 DIAGNOSIS — Z87.891 PERSONAL HISTORY OF NICOTINE DEPENDENCE: ICD-10-CM

## 2022-06-20 DIAGNOSIS — Z72.0 TOBACCO USE: Primary | ICD-10-CM

## 2022-07-06 ENCOUNTER — TELEPHONE (OUTPATIENT)
Dept: PRIMARY CARE CLINIC | Facility: CLINIC | Age: 67
End: 2022-07-06
Payer: MEDICARE

## 2022-07-06 NOTE — TELEPHONE ENCOUNTER
----- Message from Jenelle Gallardo sent at 7/6/2022 11:28 AM CDT -----  Regarding: eAWV  Pt is due for eAWV

## 2022-07-07 ENCOUNTER — TELEPHONE (OUTPATIENT)
Dept: PRIMARY CARE CLINIC | Facility: CLINIC | Age: 67
End: 2022-07-07
Payer: MEDICARE

## 2022-07-07 NOTE — TELEPHONE ENCOUNTER
----- Message from Jenelle Gallardo sent at 7/6/2022 12:29 PM CDT -----  Regarding: eAWV  For appointments with Dr. Reyes I cannot schedule these visits

## 2022-07-13 ENCOUNTER — TELEPHONE (OUTPATIENT)
Dept: PRIMARY CARE CLINIC | Facility: CLINIC | Age: 67
End: 2022-07-13
Payer: MEDICARE

## 2022-07-13 DIAGNOSIS — Z12.31 ENCOUNTER FOR SCREENING MAMMOGRAM FOR MALIGNANT NEOPLASM OF BREAST: Primary | ICD-10-CM

## 2022-07-13 NOTE — TELEPHONE ENCOUNTER
----- Message from Ulices Bray sent at 7/13/2022  9:51 AM CDT -----  Regarding: mammo orders      Pt due for her mammo and would like you to enter her orders in     Please contact the Pt after- so she will know to call us back to sched.     # [phone]

## 2022-08-09 ENCOUNTER — PATIENT MESSAGE (OUTPATIENT)
Dept: PRIMARY CARE CLINIC | Facility: CLINIC | Age: 67
End: 2022-08-09
Payer: MEDICARE

## 2022-08-09 NOTE — TELEPHONE ENCOUNTER
Her COVID risk score is only 1 so does not qualify for infusion.   Elizabeth, can you call pt to check on her? Can symptomatically treat but if she wants the paxlovid, oral medication, that decreases risk of hospitalization/death, can send in. Risk of rebound COVID w/ med though. And if she does take paxlovid, then needs to stop pravastatin x 10 days starting on the day she takes paxlovid. Let me know reply.

## 2022-08-09 NOTE — TELEPHONE ENCOUNTER
Pt is going to treat symptoms with OTC medications.   Cough bad at night. Taking ibuprofen for body aches which helps, getting some mucinex.  She will report to us her symptoms over the next few days. If she feels like she is getting worse she will call us.   No fever or SOB at this time. Sinus congestion and body aches.

## 2022-08-17 ENCOUNTER — PES CALL (OUTPATIENT)
Dept: ADMINISTRATIVE | Facility: CLINIC | Age: 67
End: 2022-08-17
Payer: MEDICARE

## 2022-10-04 ENCOUNTER — HOSPITAL ENCOUNTER (OUTPATIENT)
Dept: RADIOLOGY | Facility: HOSPITAL | Age: 67
Discharge: HOME | End: 2022-10-04
Attending: INTERNAL MEDICINE
Payer: MEDICARE

## 2022-10-04 DIAGNOSIS — Z12.31 ENCOUNTER FOR SCREENING MAMMOGRAM FOR MALIGNANT NEOPLASM OF BREAST: ICD-10-CM

## 2022-10-04 PROCEDURE — 77067 SCR MAMMO BI INCL CAD: CPT | Mod: 26,,, | Performed by: RADIOLOGY

## 2022-10-04 PROCEDURE — 77067 MAMMO DIGITAL SCREENING BILAT WITH TOMO: ICD-10-PCS | Mod: 26,,, | Performed by: RADIOLOGY

## 2022-10-04 PROCEDURE — 77063 MAMMO DIGITAL SCREENING BILAT WITH TOMO: ICD-10-PCS | Mod: 26,,, | Performed by: RADIOLOGY

## 2022-10-04 PROCEDURE — 77063 BREAST TOMOSYNTHESIS BI: CPT | Mod: TC

## 2022-10-04 PROCEDURE — 77063 BREAST TOMOSYNTHESIS BI: CPT | Mod: 26,,, | Performed by: RADIOLOGY

## 2022-10-10 ENCOUNTER — PATIENT MESSAGE (OUTPATIENT)
Dept: PRIMARY CARE CLINIC | Facility: CLINIC | Age: 67
End: 2022-10-10
Payer: MEDICARE

## 2022-10-10 ENCOUNTER — OFFICE VISIT (OUTPATIENT)
Dept: INTERNAL MEDICINE | Facility: CLINIC | Age: 67
End: 2022-10-10
Payer: MEDICARE

## 2022-10-10 VITALS
HEIGHT: 69 IN | DIASTOLIC BLOOD PRESSURE: 80 MMHG | HEART RATE: 93 BPM | OXYGEN SATURATION: 97 % | WEIGHT: 173.75 LBS | SYSTOLIC BLOOD PRESSURE: 136 MMHG | BODY MASS INDEX: 25.73 KG/M2

## 2022-10-10 DIAGNOSIS — N30.00 ACUTE CYSTITIS WITHOUT HEMATURIA: ICD-10-CM

## 2022-10-10 DIAGNOSIS — R14.0 BLOATING: Primary | ICD-10-CM

## 2022-10-10 LAB
BILIRUB UR QL STRIP: NEGATIVE
CLARITY UR REFRACT.AUTO: CLEAR
COLOR UR AUTO: COLORLESS
GLUCOSE UR QL STRIP: NEGATIVE
HGB UR QL STRIP: NEGATIVE
KETONES UR QL STRIP: NEGATIVE
LEUKOCYTE ESTERASE UR QL STRIP: NEGATIVE
NITRITE UR QL STRIP: NEGATIVE
PH UR STRIP: 6 [PH] (ref 5–8)
PROT UR QL STRIP: NEGATIVE
SP GR UR STRIP: 1 (ref 1–1.03)
URN SPEC COLLECT METH UR: ABNORMAL

## 2022-10-10 PROCEDURE — 99213 OFFICE O/P EST LOW 20 MIN: CPT | Mod: PBBFAC | Performed by: STUDENT IN AN ORGANIZED HEALTH CARE EDUCATION/TRAINING PROGRAM

## 2022-10-10 PROCEDURE — 99999 PR PBB SHADOW E&M-EST. PATIENT-LVL III: CPT | Mod: PBBFAC,GC,, | Performed by: STUDENT IN AN ORGANIZED HEALTH CARE EDUCATION/TRAINING PROGRAM

## 2022-10-10 PROCEDURE — 99213 PR OFFICE/OUTPT VISIT, EST, LEVL III, 20-29 MIN: ICD-10-PCS | Mod: S$PBB,GC,, | Performed by: STUDENT IN AN ORGANIZED HEALTH CARE EDUCATION/TRAINING PROGRAM

## 2022-10-10 PROCEDURE — 99999 PR PBB SHADOW E&M-EST. PATIENT-LVL III: ICD-10-PCS | Mod: PBBFAC,GC,, | Performed by: STUDENT IN AN ORGANIZED HEALTH CARE EDUCATION/TRAINING PROGRAM

## 2022-10-10 PROCEDURE — 99213 OFFICE O/P EST LOW 20 MIN: CPT | Mod: S$PBB,GC,, | Performed by: STUDENT IN AN ORGANIZED HEALTH CARE EDUCATION/TRAINING PROGRAM

## 2022-10-10 PROCEDURE — 81003 URINALYSIS AUTO W/O SCOPE: CPT | Performed by: STUDENT IN AN ORGANIZED HEALTH CARE EDUCATION/TRAINING PROGRAM

## 2022-10-10 RX ORDER — SIMETHICONE 125 MG
125 CAPSULE ORAL 4 TIMES DAILY PRN
Qty: 30 CAPSULE | Refills: 1 | Status: SHIPPED | OUTPATIENT
Start: 2022-10-10 | End: 2022-10-13

## 2022-10-10 NOTE — PROGRESS NOTES
I have seen the patient, reviewed the house staff's history and physical, assessment and plan. I have personally interviewed and reexamined the patient at bedside and agree with the findings, assessment and plan. Abd not acute today. Has close f/u w/ PCP and advised to go to ER if sx worsen.

## 2022-10-10 NOTE — PROGRESS NOTES
Subjective     Chief Complaint: Nausea     History of Present Illness:  Ms. Karina Thomas is a 67 y.o. female that presents for lower back pain that began 3 days ago. She was not over exerting herself or doing any out of the ordinary activity when the back pain began. She associates it with bloating, neck pain, and headache and states she has general feelings of malaise. Also has bouts of nausea not associated with her back pain or bloating. Denies any numbness, tingling, loss of sensation, difficulty ambulating, incontinence, or other symptoms. Her diet includes lots of beans, but otherwise has been unchanged from her regular diet.     She has been taking Pepto-bismal daily as well as 4x 200mg ibuprofen in the morning with a couple capsules of ibuprofen in the evening since symptoms started. She denies any other symptoms. She has a follow-up PCP appointment with Dr. Pinky Reyes in 3 days. Of not she was on macrobid for a couple of days for suspected UTI.    Review of Systems   Constitutional:  Positive for malaise/fatigue. Negative for chills and fever.   HENT:  Negative for congestion and sore throat.    Respiratory:  Negative for cough and shortness of breath.    Cardiovascular:  Negative for chest pain and leg swelling.   Gastrointestinal:  Positive for abdominal pain (bloating) and nausea. Negative for diarrhea and vomiting.   Genitourinary:  Negative for dysuria and flank pain.   Musculoskeletal:  Positive for back pain and neck pain. Negative for myalgias.   Neurological:  Positive for tingling and headaches. Negative for weakness.   Psychiatric/Behavioral:  Negative for depression. The patient is not nervous/anxious.      PAST HISTORY:     Past Medical History:   Diagnosis Date    Anxiety     GERD (gastroesophageal reflux disease)     Hyperlipidemia        Past Surgical History:   Procedure Laterality Date    APPENDECTOMY      HYSTERECTOMY  1989    TVH ov in situ - due to abnormal Pap smear       Family  History   Problem Relation Age of Onset    Cancer Father         unknown CA    Alzheimer's disease Mother     Heart disease Brother         cad s/p stent 64    No Known Problems Daughter     No Known Problems Son     Breast cancer Neg Hx     Colon cancer Neg Hx     Ovarian cancer Neg Hx        Social History     Socioeconomic History    Marital status:    Occupational History    Occupation: retired    Tobacco Use    Smoking status: Every Day     Packs/day: 1.00     Years: 26.00     Pack years: 26.00     Types: Cigarettes    Smokeless tobacco: Never   Substance and Sexual Activity    Alcohol use: Yes     Alcohol/week: 7.0 standard drinks     Types: 7 Glasses of wine per week    Drug use: No    Sexual activity: Yes     Partners: Male     Birth control/protection: None     Comment:     Social History Narrative    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     Walks about 2 days a week. Has a FITBIT and goes 10k steps every day.      Social Determinants of Health     Financial Resource Strain: Low Risk     Difficulty of Paying Living Expenses: Not hard at all   Food Insecurity: No Food Insecurity    Worried About Running Out of Food in the Last Year: Never true    Ran Out of Food in the Last Year: Never true   Transportation Needs: No Transportation Needs    Lack of Transportation (Medical): No    Lack of Transportation (Non-Medical): No   Physical Activity: Unknown    Days of Exercise per Week: Patient refused    Minutes of Exercise per Session: 20 min   Stress: Stress Concern Present    Feeling of Stress : To some extent   Social Connections: Unknown    Frequency of Communication with Friends and Family: More than three times a week    Frequency of Social Gatherings with Friends and Family: Twice a week    Active Member of Clubs or Organizations: No    Attends Club or Organization Meetings: Patient refused    Marital Status:    Housing Stability: Low Risk     Unable to Pay for Housing in the Last  Year: No    Number of Places Lived in the Last Year: 1    Unstable Housing in the Last Year: No       MEDICATIONS & ALLERGIES:     Current Outpatient Medications on File Prior to Visit   Medication Sig    albuterol (PROVENTIL/VENTOLIN HFA) 90 mcg/actuation inhaler INHALE 2 PUFFS INTO LUNGS EVERY 6 HOURS AS NEEDED FOR WHEEZING OR SHORTNESS OF BREATH    ascorbate calcium (ZENIA-C ORAL) Take by mouth.    aspirin 81 MG Chew Take 81 mg by mouth once daily.    BILBERRY ORAL Take by mouth.    CHOLINE ORAL Take 500 mg by mouth.    cyanocobalamin (VITAMIN B-12) 1000 MCG tablet Take 3,000 mcg by mouth once daily.     esomeprazole (NEXIUM) 40 MG capsule TAKE 1 CAPSULE BY MOUTH EVERY DAY    INV folate 800 MCG tablet Take 800 mcg by mouth once daily. Methylfolate    L. rhamnosus GG/inulin (CULTURELLE PROBIOTICS ORAL) Take by mouth.    LORazepam (ATIVAN) 0.5 MG tablet TAKE 1 TABLET BY MOUTH EVERY DAY AS NEEDED FOR ANXIETY    milk thistle 175 mg tablet Take 175 mg by mouth once daily.    omega-3 acid ethyl esters (LOVAZA) 1 gram capsule Take 2 capsules (2 g total) by mouth 2 (two) times daily.    pravastatin (PRAVACHOL) 80 MG tablet Take 1 tablet (80 mg total) by mouth once daily.    ubidecarenone/vitamin E mixed (COQ10  ORAL) Take by mouth.    vitamin D 1000 units Tab Take 185 mg by mouth once daily.     No current facility-administered medications on file prior to visit.       Review of patient's allergies indicates:  No Known Allergies    OBJECTIVE:     Vital Signs:  There were no vitals filed for this visit.    There is no height or weight on file to calculate BMI.     Physical Exam:  General:  Well developed, well nourished, no acute distress  Head: Normocephalic, atraumatic  Eyes: PERRL, EOMI, clear sclera  Throat: No posterior pharyngeal erythema or exudate, no tonsillar exudate  Neck: supple, normal ROM, no thyromegaly, no neck tenderness   CVS:  RRR, S1 and S2 normal, no murmurs, rubs, gallops, 2+ peripheral  pulses  Resp:  Lungs clear to auscultation, no wheezes, rales, rhonchi, cough  GI:  Abdomen soft, mild tender epigastric, non-distended, normoactive bowel sounds  MSK:  No muscle atrophy, cyanosis, peripheral edema. No back tenderness  Skin:  No rashes, ulcers, erythema  Neuro:  CNII-XII grossly intact, no focal deficits noted  Psych:  Appropriate mood and affect, normal judgement    Laboratory  Lab Results   Component Value Date    WBC 7.86 03/03/2022    HGB 15.1 03/03/2022    HCT 44.4 03/03/2022    MCV 97 03/03/2022     03/03/2022     @[id number](GLU,NA,K,Cl,CO2,BUN,Creatinine,Calcium,MG)@  No results found for: INR, PROTIME  Lab Results   Component Value Date    HGBA1C 5.0 05/31/2021     No results for input(s): POCTGLUCOSE in the last 72 hours.    Diagnostic Results:  N/A    ASSESSMENT & PLAN:   Ms. Karina Thomas is a 67 y.o. female who presents today with back pain and nausea.     Karina was seen today for nausea.    Diagnoses and all orders for this visit:    Bloating  Possibly from taking Macrobid, otherwise asymptomatic.   -     simethicone (GAS RELIEF, SIMETHICONE,) 125 mg Cap capsule; Take 1 capsule (125 mg total) by mouth 4 (four) times daily as needed for Flatulence (and bloating).    Acute cystitis without hematuria  Recent possible UTI. Was prescribed Macrobid by Dr. Reyes, will collect UA. Maybe contributing to her back pain.     -     Urinalysis, Reflex to Urine Culture Urine, Clean Catch              RTC in PRN    Case discussed with Dr Sultan Kaye Grey, DO  Internal Medicine PGY1  [facility]  [address] [place] [id number] [phone]

## 2022-10-10 NOTE — PATIENT INSTRUCTIONS
Please discontinue ibuprofen and switch to tylenol as needed for back pain. We will check a urine test for possible UTI. Otherwise take the simethicone for bloating as needed up to 4 times a day. If your symptoms worsen, please let us know.

## 2022-10-13 ENCOUNTER — LAB VISIT (OUTPATIENT)
Dept: LAB | Facility: HOSPITAL | Age: 67
End: 2022-10-13
Attending: INTERNAL MEDICINE
Payer: MEDICARE

## 2022-10-13 ENCOUNTER — OFFICE VISIT (OUTPATIENT)
Dept: PRIMARY CARE CLINIC | Facility: CLINIC | Age: 67
End: 2022-10-13
Payer: MEDICARE

## 2022-10-13 VITALS
TEMPERATURE: 98 F | DIASTOLIC BLOOD PRESSURE: 62 MMHG | OXYGEN SATURATION: 98 % | BODY MASS INDEX: 25.37 KG/M2 | WEIGHT: 171.31 LBS | HEART RATE: 85 BPM | HEIGHT: 69 IN | SYSTOLIC BLOOD PRESSURE: 137 MMHG

## 2022-10-13 DIAGNOSIS — R31.9 HEMATURIA, UNSPECIFIED TYPE: Primary | ICD-10-CM

## 2022-10-13 DIAGNOSIS — I70.0 AORTIC ATHEROSCLEROSIS: ICD-10-CM

## 2022-10-13 DIAGNOSIS — E78.5 HYPERLIPIDEMIA, UNSPECIFIED HYPERLIPIDEMIA TYPE: ICD-10-CM

## 2022-10-13 DIAGNOSIS — R31.9 HEMATURIA, UNSPECIFIED TYPE: ICD-10-CM

## 2022-10-13 DIAGNOSIS — K21.9 GASTROESOPHAGEAL REFLUX DISEASE, UNSPECIFIED WHETHER ESOPHAGITIS PRESENT: ICD-10-CM

## 2022-10-13 LAB
ALBUMIN SERPL BCP-MCNC: 3.6 G/DL (ref 3.5–5.2)
ALP SERPL-CCNC: 59 U/L (ref 55–135)
ALT SERPL W/O P-5'-P-CCNC: 26 U/L (ref 10–44)
ANION GAP SERPL CALC-SCNC: 13 MMOL/L (ref 8–16)
AST SERPL-CCNC: 19 U/L (ref 10–40)
BILIRUB SERPL-MCNC: 0.5 MG/DL (ref 0.1–1)
BUN SERPL-MCNC: 6 MG/DL (ref 8–23)
CALCIUM SERPL-MCNC: 9.5 MG/DL (ref 8.7–10.5)
CHLORIDE SERPL-SCNC: 99 MMOL/L (ref 95–110)
CHOLEST SERPL-MCNC: 185 MG/DL (ref 120–199)
CHOLEST/HDLC SERPL: 2.4 {RATIO} (ref 2–5)
CO2 SERPL-SCNC: 25 MMOL/L (ref 23–29)
CREAT SERPL-MCNC: 0.7 MG/DL (ref 0.5–1.4)
EST. GFR  (NO RACE VARIABLE): >60 ML/MIN/1.73 M^2
GLUCOSE SERPL-MCNC: 91 MG/DL (ref 70–110)
HDLC SERPL-MCNC: 76 MG/DL (ref 40–75)
HDLC SERPL: 41.1 % (ref 20–50)
LDLC SERPL CALC-MCNC: 92.6 MG/DL (ref 63–159)
NONHDLC SERPL-MCNC: 109 MG/DL
POTASSIUM SERPL-SCNC: 3.5 MMOL/L (ref 3.5–5.1)
PROT SERPL-MCNC: 6.8 G/DL (ref 6–8.4)
SODIUM SERPL-SCNC: 137 MMOL/L (ref 136–145)
TRIGL SERPL-MCNC: 82 MG/DL (ref 30–150)

## 2022-10-13 PROCEDURE — 99214 PR OFFICE/OUTPT VISIT, EST, LEVL IV, 30-39 MIN: ICD-10-PCS | Mod: S$PBB,,, | Performed by: INTERNAL MEDICINE

## 2022-10-13 PROCEDURE — G0009 ADMIN PNEUMOCOCCAL VACCINE: HCPCS | Mod: PBBFAC,PN

## 2022-10-13 PROCEDURE — 99215 OFFICE O/P EST HI 40 MIN: CPT | Mod: PBBFAC,PN | Performed by: INTERNAL MEDICINE

## 2022-10-13 PROCEDURE — 80053 COMPREHEN METABOLIC PANEL: CPT | Performed by: INTERNAL MEDICINE

## 2022-10-13 PROCEDURE — 80061 LIPID PANEL: CPT | Performed by: INTERNAL MEDICINE

## 2022-10-13 PROCEDURE — 99214 OFFICE O/P EST MOD 30 MIN: CPT | Mod: S$PBB,,, | Performed by: INTERNAL MEDICINE

## 2022-10-13 PROCEDURE — 99999 PR PBB SHADOW E&M-EST. PATIENT-LVL V: ICD-10-PCS | Mod: PBBFAC,,, | Performed by: INTERNAL MEDICINE

## 2022-10-13 PROCEDURE — 99999 PR PBB SHADOW E&M-EST. PATIENT-LVL V: CPT | Mod: PBBFAC,,, | Performed by: INTERNAL MEDICINE

## 2022-10-13 PROCEDURE — 36415 COLL VENOUS BLD VENIPUNCTURE: CPT | Mod: PN | Performed by: INTERNAL MEDICINE

## 2022-10-13 NOTE — PROGRESS NOTES
Pt here for  MD visit. Pneumovax 23 shot ordered.   Pt ID by name and . Allergies reviewed.   VIS given and reviewed.   Pt agreeable to getting Pneumovax shot today.   Shot administered to left deltoid.  Pt instructed to remain in clinic for 15 minutes  No complaints.   Understands to call ot message clinic with any questions or concerns

## 2022-10-13 NOTE — PROGRESS NOTES
"Subjective:       Patient ID: Karina Thomas is a 67 y.o. female.    Chief Complaint: f/u w/ hematuria.    HPI  Recently seen in resident clinic w/ Dr. Grey 10/10/22 for LBP and nausea. Dx w/ bloating and acute cystitis.     Pt reports felt like she had some UTI symptoms 3 days ago - pain while urinating. Had blood in the urine yesterday and kept on urinating. Finally at 2pm yesterday, hasn't had any pain. No more urinary symptoms. Has had kidney stones before. Thinks she might've passed it.     Still w/ some bloating. No changes in food or association. Comes and goes. Takes gas x and it helps. Takes a probiotics daily.     Does have constipation. Uses milk of magnesia once a mo.     GERD - esomeprazole 40mg qam.     HLD - pravastatin 80mg daily. Aortic atherosclerosis.     Review of Systems   Constitutional:  Negative for activity change and unexpected weight change.   HENT:  Negative for hearing loss, rhinorrhea and trouble swallowing.    Eyes:  Negative for discharge and visual disturbance.   Respiratory:  Negative for chest tightness and wheezing.    Cardiovascular:  Negative for chest pain and palpitations.   Gastrointestinal:  Positive for constipation. Negative for blood in stool, diarrhea and vomiting.   Endocrine: Negative for polydipsia and polyuria.   Genitourinary:  Negative for difficulty urinating, dysuria, hematuria and menstrual problem.   Musculoskeletal:  Positive for arthralgias. Negative for joint swelling and neck pain.   Neurological:  Positive for weakness and headaches.   Psychiatric/Behavioral:  Negative for confusion and dysphoric mood.        Objective:      Physical Exam    /62 (BP Location: Left arm, Patient Position: Sitting, BP Method: Medium (Manual))   Pulse 85   Temp 97.8 °F (36.6 °C) (Oral)   Ht 5' 9" (1.753 m)   Wt 77.7 kg (171 lb 4.8 oz)   SpO2 98%   BMI 25.30 kg/m²     GEN - A+OX4, NAD   HEENT - PERRL, EOMI, OP clear. MMM.   Neck - No thyromegaly or cervical LAD. No " thyroid masses felt.  CV - RRR, no m/r   Chest - CTAB, no wheezing or rhonchi  Abd - S/NT/ND/+BS.   Ext - 2+BDP and radial pulses. No C/C/E.  Msk - No spinal tenderness to palpation. Normal gait.   Skin - No rash.    Previous labs reviewed.     Assessment/Plan     Diagnoses and all orders for this visit:    Hematuria, unspecified type - sounds like passed kidney stone. Will check UA w/ micro and CMP.   -     Urinalysis; Future  -     Urinalysis Microscopic; Future  -     Comprehensive Metabolic Panel; Future    Hyperlipidemia, unspecified hyperlipidemia type  -     Comprehensive Metabolic Panel; Future  -     Lipid Panel; Future    Aortic atherosclerosis  -     Comprehensive Metabolic Panel; Future  -     Lipid Panel; Future    Gastroesophageal reflux disease, unspecified whether esophagitis present - cont esomeprazole.     Pneumovax today. Declines flu vaccine.    Follow up in about 6 months (around 4/13/2023).      Pinky Reyes MD  Department of Internal Medicine - [facility]  1:21 PM

## 2022-10-25 ENCOUNTER — TELEPHONE (OUTPATIENT)
Dept: PRIMARY CARE CLINIC | Facility: CLINIC | Age: 67
End: 2022-10-25
Payer: MEDICARE

## 2022-10-26 ENCOUNTER — PATIENT MESSAGE (OUTPATIENT)
Dept: PRIMARY CARE CLINIC | Facility: CLINIC | Age: 67
End: 2022-10-26
Payer: MEDICARE

## 2022-10-26 RX ORDER — PANTOPRAZOLE SODIUM 40 MG/1
40 TABLET, DELAYED RELEASE ORAL DAILY
Qty: 90 TABLET | Refills: 3 | Status: SHIPPED | OUTPATIENT
Start: 2022-10-26 | End: 2023-05-08

## 2022-10-26 NOTE — TELEPHONE ENCOUNTER
Tier 1 Covered Drugs:  Olmeprazole capsule delayed release 10 / 20 / or 40 mg.    Pantoprazole tablet delayed release 20 or 40 mg        These are higher tier drugs but covered:  Dexlansoprazole capsule delayed release 30 or 60 mg    Lansoprazole capsule delayed release 15 or 30 mg

## 2022-12-28 ENCOUNTER — TELEPHONE (OUTPATIENT)
Dept: FAMILY MEDICINE | Facility: CLINIC | Age: 67
End: 2022-12-28
Payer: MEDICARE

## 2022-12-30 ENCOUNTER — TELEPHONE (OUTPATIENT)
Dept: FAMILY MEDICINE | Facility: CLINIC | Age: 67
End: 2022-12-30
Payer: MEDICARE

## 2023-01-26 DIAGNOSIS — F41.9 ANXIETY: ICD-10-CM

## 2023-01-26 RX ORDER — LORAZEPAM 0.5 MG/1
TABLET ORAL
Qty: 30 TABLET | Refills: 0 | Status: SHIPPED | OUTPATIENT
Start: 2023-01-26 | End: 2023-03-15

## 2023-01-29 ENCOUNTER — OFFICE VISIT (OUTPATIENT)
Dept: URGENT CARE | Facility: CLINIC | Age: 68
End: 2023-01-29
Payer: MEDICARE

## 2023-01-29 VITALS
HEART RATE: 99 BPM | RESPIRATION RATE: 18 BRPM | BODY MASS INDEX: 25.33 KG/M2 | SYSTOLIC BLOOD PRESSURE: 154 MMHG | WEIGHT: 171 LBS | DIASTOLIC BLOOD PRESSURE: 98 MMHG | HEIGHT: 69 IN | OXYGEN SATURATION: 98 % | TEMPERATURE: 98 F

## 2023-01-29 DIAGNOSIS — K52.9 COLITIS: ICD-10-CM

## 2023-01-29 DIAGNOSIS — R19.7 DIARRHEA, UNSPECIFIED TYPE: ICD-10-CM

## 2023-01-29 DIAGNOSIS — R10.30 LOWER ABDOMINAL PAIN: Primary | ICD-10-CM

## 2023-01-29 DIAGNOSIS — R11.0 NAUSEA IN ADULT: ICD-10-CM

## 2023-01-29 LAB
BILIRUB UR QL STRIP: NEGATIVE
GLUCOSE UR QL STRIP: NEGATIVE
KETONES UR QL STRIP: NEGATIVE
LEUKOCYTE ESTERASE UR QL STRIP: NEGATIVE
PH, POC UA: 5 (ref 5–8)
POC BLOOD, URINE: NEGATIVE
POC NITRATES, URINE: NEGATIVE
PROT UR QL STRIP: NEGATIVE
SP GR UR STRIP: 1 (ref 1–1.03)
UROBILINOGEN UR STRIP-ACNC: NORMAL (ref 0.1–1.1)

## 2023-01-29 PROCEDURE — 99214 PR OFFICE/OUTPT VISIT, EST, LEVL IV, 30-39 MIN: ICD-10-PCS | Mod: S$GLB,,, | Performed by: FAMILY MEDICINE

## 2023-01-29 PROCEDURE — 74019 XR ABDOMEN FLAT AND ERECT: ICD-10-PCS | Mod: FY,S$GLB,, | Performed by: STUDENT IN AN ORGANIZED HEALTH CARE EDUCATION/TRAINING PROGRAM

## 2023-01-29 PROCEDURE — 81003 POCT URINALYSIS, DIPSTICK, AUTOMATED, W/O SCOPE: ICD-10-PCS | Mod: QW,S$GLB,, | Performed by: FAMILY MEDICINE

## 2023-01-29 PROCEDURE — 81003 URINALYSIS AUTO W/O SCOPE: CPT | Mod: QW,S$GLB,, | Performed by: FAMILY MEDICINE

## 2023-01-29 PROCEDURE — 99214 OFFICE O/P EST MOD 30 MIN: CPT | Mod: S$GLB,,, | Performed by: FAMILY MEDICINE

## 2023-01-29 PROCEDURE — 74019 RADEX ABDOMEN 2 VIEWS: CPT | Mod: FY,S$GLB,, | Performed by: STUDENT IN AN ORGANIZED HEALTH CARE EDUCATION/TRAINING PROGRAM

## 2023-01-29 RX ORDER — DICYCLOMINE HYDROCHLORIDE 20 MG/1
20 TABLET ORAL 3 TIMES DAILY PRN
Qty: 120 TABLET | Refills: 0 | Status: SHIPPED | OUTPATIENT
Start: 2023-01-29 | End: 2023-03-14

## 2023-01-29 NOTE — PATIENT INSTRUCTIONS
Follow up with your gastroenterologist    Seek immediate care in the emergency room in the event of severe abdominal pain, chest pain, respiratory distress, fever unresponsive to antipyretic, dehydration, loss of consciousness, seizure.

## 2023-01-29 NOTE — PROGRESS NOTES
"Subjective:       Patient ID: Karina Thomas is a 67 y.o. female.    Vitals:  height is 5' 9" (1.753 m) and weight is 77.6 kg (171 lb). Her temperature is 97.9 °F (36.6 °C). Her blood pressure is 154/98 (abnormal) and her pulse is 99. Her respiration is 18 and oxygen saturation is 98%.     Chief Complaint: Abdominal Cramping    Pt complains x4 days of abdominal cramping/ pain, diarrhea, nausea. Took pepto with mild relief.     Abdominal Cramping  This is a new problem. The current episode started in the past 7 days. The onset quality is sudden. The problem occurs constantly. The problem has been unchanged. The pain is located in the left flank, right flank, RLQ and LLQ. The pain is at a severity of 8/10. The pain is mild. The quality of the pain is aching, cramping and sharp. The abdominal pain radiates to the right flank and left flank. Associated symptoms include diarrhea and nausea. She has tried antacids for the symptoms. The treatment provided mild relief.     Gastrointestinal:  Positive for abdominal pain, nausea and diarrhea.     Objective:      Vitals:    01/29/23 1236   BP: (!) 154/98   Pulse: 99   Resp: 18   Temp: 97.9 °F (36.6 °C)   SpO2: 98%   Weight: 77.6 kg (171 lb)   Height: 5' 9" (1.753 m)      Physical Exam   Constitutional: She is oriented to person, place, and time.  Non-toxic appearance. She does not appear ill. No distress.   HENT:   Head: Atraumatic.   Eyes: Conjunctivae are normal.   Cardiovascular: Normal rate, regular rhythm, normal heart sounds and normal pulses.   Pulmonary/Chest: Effort normal and breath sounds normal.   Abdominal: Bowel sounds are normal. She exhibits no distension and no mass. Soft. There is abdominal tenderness (on deep palpation of lower abdomen). There is no rebound, no guarding, no left CVA tenderness and no right CVA tenderness. No hernia.   Neurological: no focal deficit. She is alert and oriented to person, place, and time.   Skin: Skin is not diaphoretic. " Capillary refill takes less than 2 seconds.   Psychiatric: Judgment and thought content normal.       Results for orders placed or performed in visit on 01/29/23   POCT Urinalysis, Dipstick, Automated, W/O Scope   Result Value Ref Range    POC Blood, Urine Negative Negative    POC Bilirubin, Urine Negative Negative    POC Urobilinogen, Urine Normal 0.1 - 1.1    POC Ketones, Urine Negative Negative    POC Protein, Urine Negative Negative    POC Nitrates, Urine Negative Negative    POC Glucose, Urine Negative Negative    pH, UA 5.0 5 - 8    POC Specific Gravity, Urine 1.005 1.003 - 1.029    POC Leukocytes, Urine Negative Negative      US Abdomen 3/15/22  Impression:     2 mm adherent gallstone versus polyp.  No sonographic findings to suggest acute cholecystitis.     Stable ectasia of the distal abdominal aorta.    XR ABDOMEN FLAT AND ERECT    Result Date: 1/29/2023  EXAMINATION: XR ABDOMEN FLAT AND ERECT CLINICAL HISTORY: Lower abdominal pain, unspecified TECHNIQUE: Flat and erect AP views of the abdomen were performed. COMPARISON: None FINDINGS: Scattered aerated bowel with nonspecific pattern.  No severe gaseous distension or air-fluid levels to suggest high-grade obstruction.  No obvious free intraperitoneal air.  Multiple pelvic phleboliths.  Mild levocurvature of lumbar spine.     As above. Electronically signed by: Elvis Luis Date:    01/29/2023 Time:    14:12      Assessment:       1. Lower abdominal pain    2. Diarrhea, unspecified type    3. Colitis    4. Nausea in adult          Plan:         Lower abdominal pain  -     POCT Urinalysis, Dipstick, Automated, W/O Scope  -     XR ABDOMEN FLAT AND ERECT; Future; Expected date: 01/29/2023  -     dicyclomine (BENTYL) 20 mg tablet; Take 1 tablet (20 mg total) by mouth 3 (three) times daily as needed (abdominal cramping).  Dispense: 120 tablet; Refill: 0  Non acute abdomen on exam  Patient has had 2 ultrasounds of Abdomen with most recent on 3/15/22.  Colonoscopy 8/12/2020. No CT scan of abdomen.    2. Diarrhea, unspecified type  -     POCT Urinalysis, Dipstick, Automated, W/O Scope  Make sure to replace fluid and electrolytes. Advance diet as tolerated.  Patient denies recent antibiotic use, contaminated foods, sick contacts or recent travel.    3. Colitis  Will avoid antibiotics at this time. Patient is hemodynamically stable.    4. Nausea in adult  Patient declines Rx antiemetic. No vomiting. She is replacing fluids and electrolytes- able to tolerate diet.    This was a 40 minute visit, for which 30 mins were spent reviewing the patient's previous visits, labs and images to look for any trends and/or previous treatments.      Patient Instructions   Follow up with your gastroenterologist    Seek immediate care in the emergency room in the event of severe abdominal pain, chest pain, respiratory distress, fever unresponsive to antipyretic, dehydration, loss of consciousness, seizure.             Diarrhea, Adult ED   General Information   You came to the Emergency Department (ED) for diarrhea. Most doctors say you have diarrhea if you have 3 or more runny or watery stools or bowel movements in a day. The doctors feel that the risk of a serious cause for your diarrhea is low.  Diarrhea that starts all of a sudden is most often caused by a virus or bacteria. This will likely get better on its own after a few days. Other things can cause you to have loose stools like:  Side effects from the medicines you take.  Problems digesting your food.  Problems with your digestive system.  You may be waiting on some test results. The staff will contact you if there are concerning results.  What care is needed at home?   Call your regular doctor to let them know you were in the ED. Make a follow-up appointment if you were told to.  Drink small amounts of fluid every 15 to 30 minutes. Good fluids to drink are water, broth, and oral electrolyte solutions. Sugar-free or very low  sugar sports drinks are also OK.  Try to eat a small amount of food. Good foods to eat are potatoes, noodles, rice, oatmeal, crackers, soup, soft vegetables, and bananas. Avoid fatty foods and dairy products.  Wash your hands often. This will help keep others healthy. It is easy to spread diarrhea from one person to the next.  Stay home from school or work until you have stopped having diarrhea. Do not cook food for others while you have diarrhea.  If you were given any medicines, make sure to take them as you were told.  When do I need to get emergency help?   Return to the ED if:   You have signs of severe fluid loss, such as:  No urine for more than 8 hours.  Feel very light-headed or like you are going to pass out.  Feel weak like you are going to fall.  Your stools have a large amount (more than 1 teaspoon or 5 mL) of blood in them.  You have very bad belly pain.  When do I need to call the doctor?   You have more than 6 runny, watery stools in 24 hours.  You have a fever of 101.3°F (38.5°C) or higher or chills that do not go away after a day.  You have very bad belly pain.  Your stools have a small amount (less than 1 teaspoon or 5 mL) of blood in them.  You develop early signs of fluid loss, such as:  Your urine is very dark colored.  Your mouth is dry.  You have muscle cramps.  You have a lack of energy.  You feel light-headed when you get up.  You have new or worsening symptoms.  Last Reviewed Date   2021-05-21  Consumer Information Use and Disclaimer   This information is not specific medical advice and does not replace information you receive from your health care provider. This is only a brief summary of general information. It does NOT include all information about conditions, illnesses, injuries, tests, procedures, treatments, therapies, discharge instructions or life-style choices that may apply to you. You must talk with your health care provider for complete information about your health and  treatment options. This information should not be used to decide whether or not to accept your health care providers advice, instructions or recommendations. Only your health care provider has the knowledge and training to provide advice that is right for you.  Copyright   Copyright © 2021 [employer] and its affiliates and/or licensors. All rights reserved.

## 2023-02-03 ENCOUNTER — OFFICE VISIT (OUTPATIENT)
Dept: PRIMARY CARE CLINIC | Facility: CLINIC | Age: 68
End: 2023-02-03
Payer: MEDICARE

## 2023-02-03 ENCOUNTER — HOSPITAL ENCOUNTER (OUTPATIENT)
Dept: RADIOLOGY | Facility: HOSPITAL | Age: 68
Discharge: HOME | End: 2023-02-03
Attending: INTERNAL MEDICINE
Payer: MEDICARE

## 2023-02-03 VITALS
DIASTOLIC BLOOD PRESSURE: 75 MMHG | SYSTOLIC BLOOD PRESSURE: 150 MMHG | HEART RATE: 91 BPM | BODY MASS INDEX: 24.14 KG/M2 | WEIGHT: 168.63 LBS | HEIGHT: 70 IN | OXYGEN SATURATION: 98 %

## 2023-02-03 DIAGNOSIS — R10.30 LOWER ABDOMINAL PAIN: Primary | ICD-10-CM

## 2023-02-03 DIAGNOSIS — E78.5 HYPERLIPIDEMIA, UNSPECIFIED HYPERLIPIDEMIA TYPE: ICD-10-CM

## 2023-02-03 DIAGNOSIS — R10.30 LOWER ABDOMINAL PAIN: ICD-10-CM

## 2023-02-03 DIAGNOSIS — I70.0 AORTIC ATHEROSCLEROSIS: ICD-10-CM

## 2023-02-03 DIAGNOSIS — R20.0 RIGHT SIDED NUMBNESS: ICD-10-CM

## 2023-02-03 PROCEDURE — 74176 CT ABDOMEN PELVIS WITHOUT CONTRAST: ICD-10-PCS | Mod: 26,,, | Performed by: RADIOLOGY

## 2023-02-03 PROCEDURE — 74176 CT ABD & PELVIS W/O CONTRAST: CPT | Mod: 26,,, | Performed by: RADIOLOGY

## 2023-02-03 PROCEDURE — 99999 PR PBB SHADOW E&M-EST. PATIENT-LVL V: ICD-10-PCS | Mod: PBBFAC,,, | Performed by: INTERNAL MEDICINE

## 2023-02-03 PROCEDURE — 99214 PR OFFICE/OUTPT VISIT, EST, LEVL IV, 30-39 MIN: ICD-10-PCS | Mod: S$PBB,,, | Performed by: INTERNAL MEDICINE

## 2023-02-03 PROCEDURE — 99214 OFFICE O/P EST MOD 30 MIN: CPT | Mod: S$PBB,,, | Performed by: INTERNAL MEDICINE

## 2023-02-03 PROCEDURE — 99215 OFFICE O/P EST HI 40 MIN: CPT | Mod: PBBFAC,PN | Performed by: INTERNAL MEDICINE

## 2023-02-03 PROCEDURE — 74176 CT ABD & PELVIS W/O CONTRAST: CPT | Mod: TC

## 2023-02-03 PROCEDURE — 99999 PR PBB SHADOW E&M-EST. PATIENT-LVL V: CPT | Mod: PBBFAC,,, | Performed by: INTERNAL MEDICINE

## 2023-02-03 PROCEDURE — 25500020 PHARM REV CODE 255: Performed by: INTERNAL MEDICINE

## 2023-02-03 RX ADMIN — IOHEXOL 30 ML: 350 INJECTION, SOLUTION INTRAVENOUS at 12:02

## 2023-02-03 NOTE — PROGRESS NOTES
Subjective:       Patient ID: Karina Thomas is a 67 y.o. female.    Chief Complaint: Diarrhea and Nausea    Diarrhea   Associated symptoms include arthralgias. Pertinent negatives include no headaches or vomiting.   Nausea  Associated symptoms include arthralgias, nausea, neck pain and weakness. Pertinent negatives include no chest pain, headaches, joint swelling or vomiting.   About a week ago, started w/ liquid diarrhea. A few days later, diffuse abdominal cramp. Went to . Given bentyl - helped. Terrible abdominal cramp went away. Still have diarrhea 2-3 times per morning (now there's debris in it). Has been drinking a lot of pepto bismol, which helps w/ the nausea. When she has diarrhea, soreness in the lower back. No fevers/chills. No blood in stool or urine.   Have very little cramping in the lower abdomen.     C/o numbness/tingling in the R side of the leg or arm and R face - intermittent. May come and go after an hour. No weakness. May occur a few times a week (first noticed about 4-5 months ago). Nothing on the L side. No right side pain.     HLD - pravastatin 80mg daily. Aortic atherosclerosis.   No new meds.    Review of Systems   Constitutional:  Negative for activity change and unexpected weight change.   HENT:  Negative for hearing loss, rhinorrhea and trouble swallowing.    Eyes:  Negative for discharge and visual disturbance.   Respiratory:  Negative for chest tightness and wheezing.    Cardiovascular:  Negative for chest pain and palpitations.   Gastrointestinal:  Positive for diarrhea and nausea. Negative for blood in stool, constipation and vomiting.   Endocrine: Negative for polydipsia and polyuria.   Genitourinary:  Negative for difficulty urinating, dysuria, hematuria and menstrual problem.   Musculoskeletal:  Positive for arthralgias and neck pain. Negative for joint swelling.   Neurological:  Positive for weakness. Negative for headaches.   Psychiatric/Behavioral:  Negative for confusion and  "dysphoric mood.        Objective:      Physical Exam    BP (!) 150/75   Pulse 91   Ht 5' 9.5" (1.765 m)   Wt 76.5 kg (168 lb 10.4 oz)   SpO2 98%   BMI 24.55 kg/m²     GEN - A+OX4, NAD   HEENT - PERRL, EOMI, OP clear. MMM.   Neck - No thyromegaly or cervical LAD. No thyroid masses felt.  CV - RRR, no m/r   Chest - CTAB, no wheezing or rhonchi  Abd - S/NT/ND/+BS.   Ext - 2+BDP and radial pulses. No C/C/E.  Neuro - 5/5 BUE and BLE strength. Normal gait.   Skin - No rash.    Previous labs reviewed.    Assessment/Plan     Karina was seen today for diarrhea and nausea.    Diagnoses and all orders for this visit:    Lower abdominal pain - f/u w/ GI.   -     CT Abdomen Pelvis  Without Contrast; Future    Right sided numbness  -     Ambulatory referral/consult to Neurology; Future    Aortic atherosclerosis - cont pravastatin.     Hyperlipidemia, unspecified hyperlipidemia type - cont pravastatin.      Follow up if symptoms worsen or fail to improve.      Pinky Reyes MD  Department of Internal Medicine - [facility]  11:29 AM    "

## 2023-02-04 ENCOUNTER — PATIENT MESSAGE (OUTPATIENT)
Dept: PRIMARY CARE CLINIC | Facility: CLINIC | Age: 68
End: 2023-02-04
Payer: MEDICARE

## 2023-02-06 ENCOUNTER — PATIENT MESSAGE (OUTPATIENT)
Dept: PRIMARY CARE CLINIC | Facility: CLINIC | Age: 68
End: 2023-02-06
Payer: MEDICARE

## 2023-02-06 DIAGNOSIS — E27.9 LESION OF ADRENAL GLAND: ICD-10-CM

## 2023-02-06 DIAGNOSIS — N83.9 LESION OF RIGHT OVARY: Primary | ICD-10-CM

## 2023-02-06 DIAGNOSIS — E78.5 HYPERLIPIDEMIA, UNSPECIFIED HYPERLIPIDEMIA TYPE: ICD-10-CM

## 2023-02-06 DIAGNOSIS — Z79.899 CURRENT USE OF PROTON PUMP INHIBITOR: ICD-10-CM

## 2023-02-06 DIAGNOSIS — I70.0 AORTIC ATHEROSCLEROSIS: ICD-10-CM

## 2023-02-07 ENCOUNTER — PATIENT MESSAGE (OUTPATIENT)
Dept: PRIMARY CARE CLINIC | Facility: CLINIC | Age: 68
End: 2023-02-07
Payer: MEDICARE

## 2023-02-07 DIAGNOSIS — R20.9 SENSATION OF COLD IN LEG: Primary | ICD-10-CM

## 2023-02-07 PROBLEM — E27.9 LESION OF ADRENAL GLAND: Status: ACTIVE | Noted: 2023-02-07

## 2023-02-07 NOTE — TELEPHONE ENCOUNTER
Please call and notify pt:  CT of abdomen/pelvis shows a very small right lower lung nodule that hasn't changed since 2021, which is good. There is a right adrenal adenoma that also has not changed in size since 2021. I will want to do some additional lab work when I see her in March to further evaluate this). There is a right kidney cyst, which is benign (not worrisome). The aorta is very calcified. Make sure to take the pravastatin 80mg daily as this helps prevent the plaque from flicking off and causing other issues. If her cholesterol is not quite at goal despite taking this medicine daily, we may end up changing to a stronger cholesterol medicine.  There is a 5.3cmx4.1cm lesion in the right ovary. I do recommend getting a pelvic ultrasound to further evaluate it. Depending on if it's a simple cyst or not, then she can decide if need to follow up with OB/GYn. Ordered, please schedule.    Please schedule her labs a week prior to seeing me in March.

## 2023-02-14 ENCOUNTER — PATIENT MESSAGE (OUTPATIENT)
Dept: PRIMARY CARE CLINIC | Facility: CLINIC | Age: 68
End: 2023-02-14
Payer: MEDICARE

## 2023-02-23 ENCOUNTER — HOSPITAL ENCOUNTER (OUTPATIENT)
Dept: RADIOLOGY | Facility: HOSPITAL | Age: 68
Discharge: HOME | End: 2023-02-23
Attending: INTERNAL MEDICINE
Payer: MEDICARE

## 2023-02-23 DIAGNOSIS — R20.9 SENSATION OF COLD IN LEG: ICD-10-CM

## 2023-02-23 PROCEDURE — 93925 US LOWER EXTREMITY ARTERIES BILATERAL: ICD-10-PCS | Mod: 26,,, | Performed by: RADIOLOGY

## 2023-02-23 PROCEDURE — 93925 LOWER EXTREMITY STUDY: CPT | Mod: 26,,, | Performed by: RADIOLOGY

## 2023-02-23 PROCEDURE — 93925 LOWER EXTREMITY STUDY: CPT | Mod: TC

## 2023-02-24 ENCOUNTER — TELEPHONE (OUTPATIENT)
Dept: PRIMARY CARE CLINIC | Facility: CLINIC | Age: 68
End: 2023-02-24

## 2023-02-24 NOTE — TELEPHONE ENCOUNTER
----- Message from Odette Degroot MD sent at 2/23/2023  1:02 PM CST -----  US LE notes no notable narrowing or blockage of the arteries. Patent and open vessels.

## 2023-02-28 ENCOUNTER — TELEPHONE (OUTPATIENT)
Dept: PRIMARY CARE CLINIC | Facility: CLINIC | Age: 68
End: 2023-02-28
Payer: MEDICARE

## 2023-02-28 ENCOUNTER — HOSPITAL ENCOUNTER (OUTPATIENT)
Dept: RADIOLOGY | Facility: HOSPITAL | Age: 68
Discharge: HOME | End: 2023-02-28
Attending: INTERNAL MEDICINE
Payer: MEDICARE

## 2023-02-28 DIAGNOSIS — N83.209 CYST OF OVARY, UNSPECIFIED LATERALITY: Primary | ICD-10-CM

## 2023-02-28 DIAGNOSIS — N83.9 LESION OF RIGHT OVARY: ICD-10-CM

## 2023-02-28 PROCEDURE — 76856 US EXAM PELVIC COMPLETE: CPT | Mod: 26,,, | Performed by: RADIOLOGY

## 2023-02-28 PROCEDURE — 76830 TRANSVAGINAL US NON-OB: CPT | Mod: 26,,, | Performed by: RADIOLOGY

## 2023-02-28 PROCEDURE — 76830 US PELVIS COMP WITH TRANSVAG NON-OB (XPD): ICD-10-PCS | Mod: 26,,, | Performed by: RADIOLOGY

## 2023-02-28 PROCEDURE — 76856 US PELVIS COMP WITH TRANSVAG NON-OB (XPD): ICD-10-PCS | Mod: 26,,, | Performed by: RADIOLOGY

## 2023-02-28 PROCEDURE — 76856 US EXAM PELVIC COMPLETE: CPT | Mod: TC

## 2023-02-28 NOTE — TELEPHONE ENCOUNTER
Please call and notify pt:  Pelvic ultrasound shows ovarian simple cyst. Likely ok but repeat US in 3-6 mo to make sure no growth in size. Ordered.

## 2023-03-09 ENCOUNTER — LAB VISIT (OUTPATIENT)
Dept: LAB | Facility: HOSPITAL | Age: 68
End: 2023-03-09
Attending: INTERNAL MEDICINE
Payer: MEDICARE

## 2023-03-09 DIAGNOSIS — E78.5 HYPERLIPIDEMIA, UNSPECIFIED HYPERLIPIDEMIA TYPE: ICD-10-CM

## 2023-03-09 DIAGNOSIS — Z79.899 CURRENT USE OF PROTON PUMP INHIBITOR: ICD-10-CM

## 2023-03-09 DIAGNOSIS — E27.9 LESION OF ADRENAL GLAND: ICD-10-CM

## 2023-03-09 DIAGNOSIS — I70.0 AORTIC ATHEROSCLEROSIS: ICD-10-CM

## 2023-03-09 LAB
ALBUMIN SERPL BCP-MCNC: 3.9 G/DL (ref 3.5–5.2)
ALP SERPL-CCNC: 65 U/L (ref 55–135)
ALT SERPL W/O P-5'-P-CCNC: 27 U/L (ref 10–44)
ANION GAP SERPL CALC-SCNC: 12 MMOL/L (ref 8–16)
AST SERPL-CCNC: 29 U/L (ref 10–40)
BASOPHILS # BLD AUTO: 0.03 K/UL (ref 0–0.2)
BASOPHILS NFR BLD: 0.4 % (ref 0–1.9)
BILIRUB SERPL-MCNC: 0.4 MG/DL (ref 0.1–1)
BUN SERPL-MCNC: 6 MG/DL (ref 8–23)
CALCIUM SERPL-MCNC: 9.2 MG/DL (ref 8.7–10.5)
CHLORIDE SERPL-SCNC: 102 MMOL/L (ref 95–110)
CHOLEST SERPL-MCNC: 208 MG/DL (ref 120–199)
CHOLEST/HDLC SERPL: 2.2 {RATIO} (ref 2–5)
CO2 SERPL-SCNC: 26 MMOL/L (ref 23–29)
CORTIS SERPL-MCNC: 10.8 UG/DL (ref 4.3–22.4)
CREAT SERPL-MCNC: 0.7 MG/DL (ref 0.5–1.4)
DIFFERENTIAL METHOD: ABNORMAL
EOSINOPHIL # BLD AUTO: 0.2 K/UL (ref 0–0.5)
EOSINOPHIL NFR BLD: 2.7 % (ref 0–8)
ERYTHROCYTE [DISTWIDTH] IN BLOOD BY AUTOMATED COUNT: 13.2 % (ref 11.5–14.5)
EST. GFR  (NO RACE VARIABLE): >60 ML/MIN/1.73 M^2
GLUCOSE SERPL-MCNC: 89 MG/DL (ref 70–110)
HCT VFR BLD AUTO: 47.7 % (ref 37–48.5)
HDLC SERPL-MCNC: 93 MG/DL (ref 40–75)
HDLC SERPL: 44.7 % (ref 20–50)
HGB BLD-MCNC: 15.5 G/DL (ref 12–16)
IMM GRANULOCYTES # BLD AUTO: 0.03 K/UL (ref 0–0.04)
IMM GRANULOCYTES NFR BLD AUTO: 0.4 % (ref 0–0.5)
LDLC SERPL CALC-MCNC: 100.2 MG/DL (ref 63–159)
LYMPHOCYTES # BLD AUTO: 2.5 K/UL (ref 1–4.8)
LYMPHOCYTES NFR BLD: 33.3 % (ref 18–48)
MCH RBC QN AUTO: 31.6 PG (ref 27–31)
MCHC RBC AUTO-ENTMCNC: 32.5 G/DL (ref 32–36)
MCV RBC AUTO: 97 FL (ref 82–98)
MONOCYTES # BLD AUTO: 0.7 K/UL (ref 0.3–1)
MONOCYTES NFR BLD: 9.4 % (ref 4–15)
NEUTROPHILS # BLD AUTO: 4 K/UL (ref 1.8–7.7)
NEUTROPHILS NFR BLD: 53.8 % (ref 38–73)
NONHDLC SERPL-MCNC: 115 MG/DL
NRBC BLD-RTO: 0 /100 WBC
PLATELET # BLD AUTO: 317 K/UL (ref 150–450)
PMV BLD AUTO: 10.1 FL (ref 9.2–12.9)
POTASSIUM SERPL-SCNC: 3.5 MMOL/L (ref 3.5–5.1)
PROT SERPL-MCNC: 6.8 G/DL (ref 6–8.4)
RBC # BLD AUTO: 4.9 M/UL (ref 4–5.4)
SODIUM SERPL-SCNC: 140 MMOL/L (ref 136–145)
TRIGL SERPL-MCNC: 74 MG/DL (ref 30–150)
WBC # BLD AUTO: 7.45 K/UL (ref 3.9–12.7)

## 2023-03-09 PROCEDURE — 84244 ASSAY OF RENIN: CPT | Performed by: INTERNAL MEDICINE

## 2023-03-09 PROCEDURE — 36415 COLL VENOUS BLD VENIPUNCTURE: CPT | Performed by: INTERNAL MEDICINE

## 2023-03-09 PROCEDURE — 82533 TOTAL CORTISOL: CPT | Performed by: INTERNAL MEDICINE

## 2023-03-09 PROCEDURE — 80061 LIPID PANEL: CPT | Performed by: INTERNAL MEDICINE

## 2023-03-09 PROCEDURE — 82384 ASSAY THREE CATECHOLAMINES: CPT | Performed by: INTERNAL MEDICINE

## 2023-03-09 PROCEDURE — 83835 ASSAY OF METANEPHRINES: CPT | Performed by: INTERNAL MEDICINE

## 2023-03-09 PROCEDURE — 85025 COMPLETE CBC W/AUTO DIFF WBC: CPT | Performed by: INTERNAL MEDICINE

## 2023-03-09 PROCEDURE — 80053 COMPREHEN METABOLIC PANEL: CPT | Performed by: INTERNAL MEDICINE

## 2023-03-12 LAB
ALDOST SERPL-MCNC: 5.3 NG/DL
ALDOST/RENIN PLAS-RTO: 26.4 RATIO
RENIN PLAS-CCNC: 0.2 NG/ML/HR

## 2023-03-14 ENCOUNTER — OFFICE VISIT (OUTPATIENT)
Dept: PRIMARY CARE CLINIC | Facility: CLINIC | Age: 68
End: 2023-03-14
Payer: MEDICARE

## 2023-03-14 VITALS
HEIGHT: 69 IN | TEMPERATURE: 98 F | SYSTOLIC BLOOD PRESSURE: 138 MMHG | OXYGEN SATURATION: 99 % | DIASTOLIC BLOOD PRESSURE: 68 MMHG | WEIGHT: 166 LBS | BODY MASS INDEX: 24.59 KG/M2 | HEART RATE: 84 BPM

## 2023-03-14 DIAGNOSIS — E26.9 HIGH ALDOSTERONE TO RENIN RATIO: ICD-10-CM

## 2023-03-14 DIAGNOSIS — E27.8 ADRENAL INCIDENTALOMA: ICD-10-CM

## 2023-03-14 DIAGNOSIS — R11.0 NAUSEA: ICD-10-CM

## 2023-03-14 DIAGNOSIS — R19.7 DIARRHEA, UNSPECIFIED TYPE: Primary | ICD-10-CM

## 2023-03-14 PROCEDURE — 99999 PR PBB SHADOW E&M-EST. PATIENT-LVL IV: CPT | Mod: PBBFAC,,, | Performed by: INTERNAL MEDICINE

## 2023-03-14 PROCEDURE — 99214 OFFICE O/P EST MOD 30 MIN: CPT | Mod: PBBFAC,PN | Performed by: INTERNAL MEDICINE

## 2023-03-14 PROCEDURE — 99214 OFFICE O/P EST MOD 30 MIN: CPT | Mod: S$PBB,,, | Performed by: INTERNAL MEDICINE

## 2023-03-14 PROCEDURE — 99214 PR OFFICE/OUTPT VISIT, EST, LEVL IV, 30-39 MIN: ICD-10-PCS | Mod: S$PBB,,, | Performed by: INTERNAL MEDICINE

## 2023-03-14 PROCEDURE — 99999 PR PBB SHADOW E&M-EST. PATIENT-LVL IV: ICD-10-PCS | Mod: PBBFAC,,, | Performed by: INTERNAL MEDICINE

## 2023-03-14 NOTE — PROGRESS NOTES
"Subjective:       Patient ID: Karina Thomas is a 67 y.o. female.    Chief Complaint: Nausea    HPI  Had a virtual GI visit w/ her outside GI - Dr. Cesar Simmons. She eventually got antibiotics (due to weight loss of 8lbs w/ the watery diarrhea). Was having nausea and was taking pepto bismol all day.   But now stools are becoming more formed though is still a little loose. Nausea is also better - taking Pepto about 3 days a week.   CT A/P 2/3/23 -   Adrenals: Right adrenal adenoma, unchanged when compared to 06/22/2021.  It measures approximately 10 mm.  Cystic right adnexal lesion that measures up to 5.3 cm.  This may represent an ovarian cyst that is incompletely characterized on this exam.  Pelvic ultrasound is recommended for further evaluation.  PELVIC us 2/28/23 - Right ovarian simple appearing cyst.  Follow-up ultrasound is recommended in 3-6 months.  No longer w/ stomach cramps.   Taking culturelle.    Feels like her legs are cold. BLE arterial US w/ significant stenosis.   No assistance w/ ADLs. Very active.     Review of Systems  Comprehensive review of systems otherwise negative. See history/subjective section for more details.    Objective:      Physical Exam    /68 (BP Location: Left arm, Patient Position: Sitting, BP Method: Large (Manual))   Pulse 84   Temp 98.1 °F (36.7 °C) (Oral)   Ht 5' 9" (1.753 m)   Wt 75.3 kg (166 lb 0.1 oz)   SpO2 99%   BMI 24.51 kg/m²     GEN - A+OX4, NAD   HEENT - PERRL, EOMI, OP clear. MMM.   Neck - No thyromegaly or cervical LAD. No thyroid masses felt.  CV - RRR, no m/r   Chest - CTAB, no wheezing or rhonchi  Abd - S/NT/ND/+BS.   Ext - 2+BDP and radial pulses. No C/C/E.  Skin - No rash.    IMAGING/LABS REVIEWED. ALDOSTERONE/RENIN ratio elevated.  Metanephrine and catecholamines are pending. Cortisol ok.     Assessment/Plan     Karina was seen today for nausea.    Diagnoses and all orders for this visit:    Diarrhea, unspecified type - s/p 2 antibiotics from Dr." Troy. UTD on scopes. Diarrhea resolving - becoming more formed. Cont probiotics.     Nausea - improving. Ok to take pepto 3 days a week.     High aldosterone to renin ratio - will likely refer to endo. BP ok. K ok though on the recent lab work on the lower side of normal though pt has been having diarrhea since Jan.     Adrenal incidentaloma - refer to endo. Pending labs for catecholamine and metanephrine.     Has will and thinks has HCPOA and living will in it.     Follow up in about 6 months (around 9/14/2023).      Pinky Reyes MD  Department of Internal Medicine - [facility]  9:31 AM

## 2023-03-14 NOTE — Clinical Note
Can you remind her to find her will and bring it in at f/u visit? She says that it has HCPOA and living will in it.

## 2023-03-16 ENCOUNTER — PATIENT MESSAGE (OUTPATIENT)
Dept: PRIMARY CARE CLINIC | Facility: CLINIC | Age: 68
End: 2023-03-16
Payer: MEDICARE

## 2023-03-16 DIAGNOSIS — I70.0 AORTIC ATHEROSCLEROSIS: ICD-10-CM

## 2023-03-16 DIAGNOSIS — E78.5 HYPERLIPIDEMIA, UNSPECIFIED HYPERLIPIDEMIA TYPE: ICD-10-CM

## 2023-03-16 DIAGNOSIS — I25.10 ATHEROSCLEROTIC CARDIOVASCULAR DISEASE: ICD-10-CM

## 2023-03-16 RX ORDER — PRAVASTATIN SODIUM 80 MG/1
80 TABLET ORAL DAILY
Qty: 90 TABLET | Refills: 0 | Status: SHIPPED | OUTPATIENT
Start: 2023-03-16 | End: 2023-05-15

## 2023-03-17 LAB
METANEPH FREE SERPL-MCNC: 40 PG/ML
METANEPHS SERPL-MCNC: 132 PG/ML
NORMETANEPH FREE SERPL-MCNC: 92 PG/ML

## 2023-03-20 ENCOUNTER — TELEPHONE (OUTPATIENT)
Dept: PRIMARY CARE CLINIC | Facility: CLINIC | Age: 68
End: 2023-03-20
Payer: MEDICARE

## 2023-03-20 DIAGNOSIS — E27.8 ADRENAL NODULE: Primary | ICD-10-CM

## 2023-03-20 DIAGNOSIS — E26.9 HIGH ALDOSTERONE TO RENIN RATIO: ICD-10-CM

## 2023-03-20 NOTE — TELEPHONE ENCOUNTER
Please call and notify pt:  Her aldosterone/renin (adrenal gland hormones) ratio is a little high along w/ there being an adrenal nodule. This is not urgent but I do want her to see endosrinology to follow this for further eval.

## 2023-03-21 ENCOUNTER — TELEPHONE (OUTPATIENT)
Dept: ENDOCRINOLOGY | Facility: CLINIC | Age: 68
End: 2023-03-21
Payer: MEDICARE

## 2023-03-21 NOTE — TELEPHONE ENCOUNTER
----- Message from Karen Cuello MA sent at 3/20/2023  5:51 PM CDT -----  Regarding: FW: Consult to Endocrinology    ----- Message -----  From: Pina Hinson  Sent: 3/20/2023   2:55 PM CDT  To: , #  Subject: Consult to Endocrinology                         Good afternoon, Dr. Pinky Reyes placed a referral for patient to Consult to Endocrinology. Please contact patient and assist with scheduling, thanks!    Adrenal nodule [E27.8]; High aldosterone to renin ratio [E26.9]

## 2023-03-31 LAB
CATECHOLS PLAS-MCNC: 832 PG/ML
DOPAMINE SERPL-MCNC: 115 PG/ML
EPINEPH PLAS-MCNC: 56 PG/ML
NOREPINEPH PLAS-MCNC: 661 PG/ML

## 2023-04-13 ENCOUNTER — OFFICE VISIT (OUTPATIENT)
Dept: ENDOCRINOLOGY | Facility: CLINIC | Age: 68
End: 2023-04-13
Payer: MEDICARE

## 2023-04-13 VITALS
WEIGHT: 164.56 LBS | BODY MASS INDEX: 24.37 KG/M2 | SYSTOLIC BLOOD PRESSURE: 142 MMHG | HEIGHT: 69 IN | HEART RATE: 103 BPM | DIASTOLIC BLOOD PRESSURE: 88 MMHG | OXYGEN SATURATION: 96 %

## 2023-04-13 DIAGNOSIS — E26.9 HIGH ALDOSTERONE TO RENIN RATIO: ICD-10-CM

## 2023-04-13 DIAGNOSIS — E27.8 ADRENAL NODULE: ICD-10-CM

## 2023-04-13 PROCEDURE — 99204 PR OFFICE/OUTPT VISIT, NEW, LEVL IV, 45-59 MIN: ICD-10-PCS | Mod: S$PBB,,, | Performed by: INTERNAL MEDICINE

## 2023-04-13 PROCEDURE — 99999 PR PBB SHADOW E&M-EST. PATIENT-LVL IV: ICD-10-PCS | Mod: PBBFAC,,, | Performed by: INTERNAL MEDICINE

## 2023-04-13 PROCEDURE — 99214 OFFICE O/P EST MOD 30 MIN: CPT | Mod: PBBFAC | Performed by: INTERNAL MEDICINE

## 2023-04-13 PROCEDURE — 99204 OFFICE O/P NEW MOD 45 MIN: CPT | Mod: S$PBB,,, | Performed by: INTERNAL MEDICINE

## 2023-04-13 PROCEDURE — 99999 PR PBB SHADOW E&M-EST. PATIENT-LVL IV: CPT | Mod: PBBFAC,,, | Performed by: INTERNAL MEDICINE

## 2023-04-13 RX ORDER — ESOMEPRAZOLE MAGNESIUM 40 MG/1
40 CAPSULE, DELAYED RELEASE ORAL DAILY
COMMUNITY
Start: 2023-03-21 | End: 2023-09-25 | Stop reason: SDUPTHER

## 2023-04-13 RX ORDER — DEXAMETHASONE 1 MG/1
1 TABLET ORAL ONCE
Qty: 1 TABLET | Refills: 0 | Status: SHIPPED | OUTPATIENT
Start: 2023-04-13 | End: 2023-04-13

## 2023-04-13 NOTE — PROGRESS NOTES
Subjective:      Patient ID: Karina Thomas is a 67 y.o. female.    Chief Complaint:  Consult and Abnormal Lab      History of Present Illness  Ms. Thomas is a 67 year old  woman who is here for the first time for evaluation of a right adrenal adenoma, discovered on imaging. This was discovered on imaging performed for chronic diarrhea and lower abdominal pain.     Denies any paroxysmal symptoms such as syncope, pallor, dizziness, palpitations. Denies new headaches. Denies history of diabetes or hyperglycemia.     Pelvic fracture after a palm tree fell on  her.   Denies history of osteoporosis/osteopenia. Denies easy bruisability or abnormal stretch marks.  Denies hirsutism, acne. She is postmenopausal and denies postmenopausal bleeding.    Denies hypertension and not taking any antihypertensive medications.      No h/o hypertensive emergency.  Denies history of malignancy.     Adrenal lesion imaging characteristics:  Adrenal nodule: Right adrenal adenoma, unchanged when compared to 06/22/2021.  It measures approximately 10 mm.    Review of Systems   Constitutional:  Negative for fever.   HENT:  Negative for congestion.    Eyes:  Negative for visual disturbance.   Respiratory:  Negative for shortness of breath.    Cardiovascular:  Negative for chest pain.   Gastrointestinal:  Negative for abdominal pain.   Genitourinary:  Negative for dysuria.   Musculoskeletal:  Negative for arthralgias.   Skin:  Negative for rash.   Neurological:  Negative for weakness.   Hematological:  Does not bruise/bleed easily.   Psychiatric/Behavioral:  Negative for sleep disturbance.      Objective:   Physical Exam  Constitutional:       General: She is not in acute distress.     Appearance: Normal appearance. She is well-developed.   Eyes:      General: No scleral icterus.     Extraocular Movements: Extraocular movements intact.      Conjunctiva/sclera: Conjunctivae normal.      Pupils: Pupils are equal, round, and reactive to light.       "Comments: No proptosis.    Neck:      Thyroid: No thyromegaly.      Trachea: No tracheal deviation.   Cardiovascular:      Rate and Rhythm: Normal rate.   Pulmonary:      Effort: Pulmonary effort is normal.      Breath sounds: Normal breath sounds.   Musculoskeletal:      Cervical back: Normal range of motion and neck supple.   Lymphadenopathy:      Cervical: No cervical adenopathy.   Skin:     General: Skin is warm and dry.      Findings: No rash.   Neurological:      Mental Status: She is alert.      Deep Tendon Reflexes: Reflexes are normal and symmetric.      Comments: No tremor.     Vitals:    04/13/23 0953   BP: (!) 142/88   BP Location: Left arm   Patient Position: Sitting   BP Method: Medium (Manual)   Pulse: 103   SpO2: 96%   Weight: 74.6 kg (164 lb 9.2 oz)   Height: 5' 9" (1.753 m)       BP Readings from Last 3 Encounters:   04/13/23 (!) 142/88   03/14/23 138/68   02/03/23 (!) 150/75     Wt Readings from Last 1 Encounters:   04/13/23 0953 74.6 kg (164 lb 9.2 oz)         Body mass index is 24.3 kg/m².    Lab Review:   Lab Results   Component Value Date    HGBA1C 5.0 05/31/2021     Lab Results   Component Value Date    CHOL 208 (H) 03/09/2023    HDL 93 (H) 03/09/2023    LDLCALC 100.2 03/09/2023    TRIG 74 03/09/2023    CHOLHDL 44.7 03/09/2023     Lab Results   Component Value Date     03/09/2023    K 3.5 03/09/2023     03/09/2023    CO2 26 03/09/2023    GLU 89 03/09/2023    BUN 6 (L) 03/09/2023    CREATININE 0.7 03/09/2023    CALCIUM 9.2 03/09/2023    PROT 6.8 03/09/2023    ALBUMIN 3.9 03/09/2023    BILITOT 0.4 03/09/2023    ALKPHOS 65 03/09/2023    AST 29 03/09/2023    ALT 27 03/09/2023    ANIONGAP 12 03/09/2023    ESTGFRAFRICA >60.0 03/03/2022    EGFRNONAA >60.0 03/03/2022    TSH 1.454 05/31/2021      Latest Reference Range & Units 03/09/23 07:10   Metanephrine, Free < OR = 57 pg/mL 40   Normetanephrine, Free < OR = 148 pg/mL 92   Metanephrine, Total, Plasma < OR = 205 pg/mL 132       Adrenal " nodule: Right adrenal adenoma, unchanged when compared to 06/22/2021.  It measures approximately 10 mm.     Assessment and Plan     Adrenal nodule  Renin 0.2 with aldosterone of 5 --> does not support hyperaldosteronism   Needs 1 mg dexamethasone suppression test   Plasma metanephrines are normal.     Plan   Repeat 1 mg DST in one year

## 2023-04-13 NOTE — ASSESSMENT & PLAN NOTE
Renin 0.2 with aldosterone of 5 --> does not support hyperaldosteronism   Needs 1 mg dexamethasone suppression test   Plasma metanephrines are normal.     Plan   Repeat 1 mg DST in one year

## 2023-04-13 NOTE — PATIENT INSTRUCTIONS
Here are the instructions for the dexamethasone suppression test.    Please take 1 mg dexamethasone between 11 PM-12 AM. Then have your blood drawn at 8-9 AM the next morning.    I have sent the prescription of dexamethasone to your pharmacy and entered the blood tests for you.

## 2023-04-20 ENCOUNTER — LAB VISIT (OUTPATIENT)
Dept: LAB | Facility: HOSPITAL | Age: 68
End: 2023-04-20
Payer: MEDICARE

## 2023-04-20 DIAGNOSIS — E27.8 ADRENAL NODULE: ICD-10-CM

## 2023-04-20 LAB — CORTIS SERPL-MCNC: <1 UG/DL (ref 4.3–22.4)

## 2023-04-20 PROCEDURE — 82533 TOTAL CORTISOL: CPT | Performed by: INTERNAL MEDICINE

## 2023-04-20 PROCEDURE — 36415 COLL VENOUS BLD VENIPUNCTURE: CPT | Performed by: INTERNAL MEDICINE

## 2023-05-03 ENCOUNTER — PATIENT MESSAGE (OUTPATIENT)
Dept: PRIMARY CARE CLINIC | Facility: CLINIC | Age: 68
End: 2023-05-03
Payer: MEDICARE

## 2023-05-03 RX ORDER — ESTRADIOL 0.1 MG/G
2 CREAM VAGINAL
Qty: 42.5 G | Refills: 3 | Status: SHIPPED | OUTPATIENT
Start: 2023-05-04 | End: 2023-07-07 | Stop reason: SDUPTHER

## 2023-05-07 ENCOUNTER — HOSPITAL ENCOUNTER (OUTPATIENT)
Facility: HOSPITAL | Age: 68
Discharge: HOME | End: 2023-05-08
Attending: EMERGENCY MEDICINE | Admitting: INTERNAL MEDICINE
Payer: MEDICARE

## 2023-05-07 ENCOUNTER — PATIENT MESSAGE (OUTPATIENT)
Dept: PRIMARY CARE CLINIC | Facility: CLINIC | Age: 68
End: 2023-05-07
Payer: MEDICARE

## 2023-05-07 DIAGNOSIS — I48.91 ATRIAL FIBRILLATION: ICD-10-CM

## 2023-05-07 DIAGNOSIS — R00.0 TACHYCARDIA: ICD-10-CM

## 2023-05-07 DIAGNOSIS — I48.91 NEW ONSET A-FIB: ICD-10-CM

## 2023-05-07 DIAGNOSIS — R07.9 CHEST PAIN: ICD-10-CM

## 2023-05-07 DIAGNOSIS — N39.0 URINARY TRACT INFECTION WITHOUT HEMATURIA, SITE UNSPECIFIED: Primary | ICD-10-CM

## 2023-05-07 PROBLEM — E83.42 HYPOMAGNESEMIA: Status: ACTIVE | Noted: 2023-05-07

## 2023-05-07 LAB
ALBUMIN SERPL BCP-MCNC: 3.9 G/DL (ref 3.5–5.2)
ALLENS TEST: NORMAL
ALP SERPL-CCNC: 62 U/L (ref 55–135)
ALT SERPL W/O P-5'-P-CCNC: 21 U/L (ref 10–44)
ANION GAP SERPL CALC-SCNC: 12 MMOL/L (ref 8–16)
AST SERPL-CCNC: 21 U/L (ref 10–40)
BACTERIA #/AREA URNS AUTO: ABNORMAL /HPF
BASOPHILS # BLD AUTO: 0.03 K/UL (ref 0–0.2)
BASOPHILS NFR BLD: 0.3 % (ref 0–1.9)
BILIRUB SERPL-MCNC: 0.6 MG/DL (ref 0.1–1)
BILIRUB UR QL STRIP: NEGATIVE
BUN SERPL-MCNC: 6 MG/DL (ref 8–23)
CALCIUM SERPL-MCNC: 9 MG/DL (ref 8.7–10.5)
CHLORIDE SERPL-SCNC: 103 MMOL/L (ref 95–110)
CLARITY UR REFRACT.AUTO: CLEAR
CO2 SERPL-SCNC: 23 MMOL/L (ref 23–29)
COLOR UR AUTO: YELLOW
CREAT SERPL-MCNC: 0.6 MG/DL (ref 0.5–1.4)
DIFFERENTIAL METHOD: ABNORMAL
EOSINOPHIL # BLD AUTO: 0 K/UL (ref 0–0.5)
EOSINOPHIL NFR BLD: 0.4 % (ref 0–8)
ERYTHROCYTE [DISTWIDTH] IN BLOOD BY AUTOMATED COUNT: 12.5 % (ref 11.5–14.5)
EST. GFR  (NO RACE VARIABLE): >60 ML/MIN/1.73 M^2
GLUCOSE SERPL-MCNC: 99 MG/DL (ref 70–110)
GLUCOSE UR QL STRIP: NEGATIVE
HCT VFR BLD AUTO: 44 % (ref 37–48.5)
HGB BLD-MCNC: 14.9 G/DL (ref 12–16)
HGB UR QL STRIP: ABNORMAL
IMM GRANULOCYTES # BLD AUTO: 0.02 K/UL (ref 0–0.04)
IMM GRANULOCYTES NFR BLD AUTO: 0.2 % (ref 0–0.5)
KETONES UR QL STRIP: NEGATIVE
LDH SERPL L TO P-CCNC: 1.69 MMOL/L (ref 0.5–2.2)
LEUKOCYTE ESTERASE UR QL STRIP: ABNORMAL
LYMPHOCYTES # BLD AUTO: 0.4 K/UL (ref 1–4.8)
LYMPHOCYTES NFR BLD: 4.2 % (ref 18–48)
MAGNESIUM SERPL-MCNC: 1.5 MG/DL (ref 1.6–2.6)
MCH RBC QN AUTO: 32.4 PG (ref 27–31)
MCHC RBC AUTO-ENTMCNC: 33.9 G/DL (ref 32–36)
MCV RBC AUTO: 96 FL (ref 82–98)
MICROSCOPIC COMMENT: ABNORMAL
MONOCYTES # BLD AUTO: 0.4 K/UL (ref 0.3–1)
MONOCYTES NFR BLD: 3.7 % (ref 4–15)
NEUTROPHILS # BLD AUTO: 8.5 K/UL (ref 1.8–7.7)
NEUTROPHILS NFR BLD: 91.2 % (ref 38–73)
NITRITE UR QL STRIP: NEGATIVE
NRBC BLD-RTO: 0 /100 WBC
PH UR STRIP: 5 [PH] (ref 5–8)
PHOSPHATE SERPL-MCNC: 3.1 MG/DL (ref 2.7–4.5)
PLATELET # BLD AUTO: 227 K/UL (ref 150–450)
PMV BLD AUTO: 9 FL (ref 9.2–12.9)
POTASSIUM SERPL-SCNC: 3.5 MMOL/L (ref 3.5–5.1)
PROT SERPL-MCNC: 6.7 G/DL (ref 6–8.4)
PROT UR QL STRIP: NEGATIVE
RBC # BLD AUTO: 4.6 M/UL (ref 4–5.4)
RBC #/AREA URNS AUTO: 1 /HPF (ref 0–4)
SAMPLE: NORMAL
SARS-COV-2 RDRP RESP QL NAA+PROBE: NEGATIVE
SITE: NORMAL
SODIUM SERPL-SCNC: 138 MMOL/L (ref 136–145)
SP GR UR STRIP: 1 (ref 1–1.03)
SQUAMOUS #/AREA URNS AUTO: 1 /HPF
URN SPEC COLLECT METH UR: ABNORMAL
WBC # BLD AUTO: 9.34 K/UL (ref 3.9–12.7)
WBC #/AREA URNS AUTO: 28 /HPF (ref 0–5)

## 2023-05-07 PROCEDURE — 81001 URINALYSIS AUTO W/SCOPE: CPT | Performed by: EMERGENCY MEDICINE

## 2023-05-07 PROCEDURE — 96365 THER/PROPH/DIAG IV INF INIT: CPT

## 2023-05-07 PROCEDURE — 63600175 PHARM REV CODE 636 W HCPCS: Performed by: EMERGENCY MEDICINE

## 2023-05-07 PROCEDURE — 93010 EKG 12-LEAD: ICD-10-PCS | Mod: 76,,, | Performed by: INTERNAL MEDICINE

## 2023-05-07 PROCEDURE — 99285 EMERGENCY DEPT VISIT HI MDM: CPT | Mod: 25

## 2023-05-07 PROCEDURE — U0002 COVID-19 LAB TEST NON-CDC: HCPCS | Performed by: EMERGENCY MEDICINE

## 2023-05-07 PROCEDURE — 83735 ASSAY OF MAGNESIUM: CPT | Performed by: EMERGENCY MEDICINE

## 2023-05-07 PROCEDURE — G0378 HOSPITAL OBSERVATION PER HR: HCPCS

## 2023-05-07 PROCEDURE — 84100 ASSAY OF PHOSPHORUS: CPT | Performed by: EMERGENCY MEDICINE

## 2023-05-07 PROCEDURE — 96368 THER/DIAG CONCURRENT INF: CPT

## 2023-05-07 PROCEDURE — 83605 ASSAY OF LACTIC ACID: CPT

## 2023-05-07 PROCEDURE — 99223 1ST HOSP IP/OBS HIGH 75: CPT | Mod: ,,, | Performed by: PHYSICIAN ASSISTANT

## 2023-05-07 PROCEDURE — 99900035 HC TECH TIME PER 15 MIN (STAT)

## 2023-05-07 PROCEDURE — 99285 EMERGENCY DEPT VISIT HI MDM: CPT | Mod: CS,,, | Performed by: EMERGENCY MEDICINE

## 2023-05-07 PROCEDURE — 96366 THER/PROPH/DIAG IV INF ADDON: CPT

## 2023-05-07 PROCEDURE — 87040 BLOOD CULTURE FOR BACTERIA: CPT | Performed by: EMERGENCY MEDICINE

## 2023-05-07 PROCEDURE — 99223 PR INITIAL HOSPITAL CARE,LEVL III: ICD-10-PCS | Mod: ,,, | Performed by: PHYSICIAN ASSISTANT

## 2023-05-07 PROCEDURE — 93005 ELECTROCARDIOGRAM TRACING: CPT

## 2023-05-07 PROCEDURE — 25000003 PHARM REV CODE 250: Performed by: EMERGENCY MEDICINE

## 2023-05-07 PROCEDURE — 87086 URINE CULTURE/COLONY COUNT: CPT | Performed by: EMERGENCY MEDICINE

## 2023-05-07 PROCEDURE — 99285 PR EMERGENCY DEPT VISIT,LEVEL V: ICD-10-PCS | Mod: CS,,, | Performed by: EMERGENCY MEDICINE

## 2023-05-07 PROCEDURE — 93010 ELECTROCARDIOGRAM REPORT: CPT | Mod: 76,,, | Performed by: INTERNAL MEDICINE

## 2023-05-07 PROCEDURE — 96361 HYDRATE IV INFUSION ADD-ON: CPT

## 2023-05-07 PROCEDURE — 25000003 PHARM REV CODE 250: Performed by: PHYSICIAN ASSISTANT

## 2023-05-07 PROCEDURE — 85025 COMPLETE CBC W/AUTO DIFF WBC: CPT | Performed by: EMERGENCY MEDICINE

## 2023-05-07 PROCEDURE — 80053 COMPREHEN METABOLIC PANEL: CPT | Performed by: EMERGENCY MEDICINE

## 2023-05-07 RX ORDER — MAGNESIUM SULFATE HEPTAHYDRATE 40 MG/ML
2 INJECTION, SOLUTION INTRAVENOUS
Status: COMPLETED | OUTPATIENT
Start: 2023-05-07 | End: 2023-05-07

## 2023-05-07 RX ORDER — TALC
6 POWDER (GRAM) TOPICAL NIGHTLY PRN
Status: DISCONTINUED | OUTPATIENT
Start: 2023-05-07 | End: 2023-05-08 | Stop reason: HOSPADM

## 2023-05-07 RX ORDER — BISACODYL 10 MG
10 SUPPOSITORY, RECTAL RECTAL DAILY PRN
Status: DISCONTINUED | OUTPATIENT
Start: 2023-05-07 | End: 2023-05-08 | Stop reason: HOSPADM

## 2023-05-07 RX ORDER — POLYETHYLENE GLYCOL 3350 17 G/17G
17 POWDER, FOR SOLUTION ORAL DAILY PRN
Status: DISCONTINUED | OUTPATIENT
Start: 2023-05-07 | End: 2023-05-08 | Stop reason: HOSPADM

## 2023-05-07 RX ORDER — SODIUM CHLORIDE, SODIUM LACTATE, POTASSIUM CHLORIDE, CALCIUM CHLORIDE 600; 310; 30; 20 MG/100ML; MG/100ML; MG/100ML; MG/100ML
INJECTION, SOLUTION INTRAVENOUS CONTINUOUS
Status: ACTIVE | OUTPATIENT
Start: 2023-05-08 | End: 2023-05-08

## 2023-05-07 RX ORDER — PRAVASTATIN SODIUM 80 MG/1
80 TABLET ORAL DAILY
Status: DISCONTINUED | OUTPATIENT
Start: 2023-05-08 | End: 2023-05-08 | Stop reason: HOSPADM

## 2023-05-07 RX ORDER — METHOCARBAMOL 500 MG/1
500 TABLET, FILM COATED ORAL 4 TIMES DAILY
Status: DISCONTINUED | OUTPATIENT
Start: 2023-05-07 | End: 2023-05-08 | Stop reason: HOSPADM

## 2023-05-07 RX ORDER — PROMETHAZINE HYDROCHLORIDE 25 MG/1
25 TABLET ORAL EVERY 6 HOURS PRN
Status: DISCONTINUED | OUTPATIENT
Start: 2023-05-07 | End: 2023-05-08 | Stop reason: HOSPADM

## 2023-05-07 RX ORDER — ACETAMINOPHEN 325 MG/1
650 TABLET ORAL EVERY 4 HOURS PRN
Status: DISCONTINUED | OUTPATIENT
Start: 2023-05-07 | End: 2023-05-08 | Stop reason: HOSPADM

## 2023-05-07 RX ORDER — IBUPROFEN 200 MG
1 TABLET ORAL DAILY PRN
Status: DISCONTINUED | OUTPATIENT
Start: 2023-05-07 | End: 2023-05-08 | Stop reason: HOSPADM

## 2023-05-07 RX ORDER — NAPROXEN SODIUM 220 MG/1
81 TABLET, FILM COATED ORAL DAILY
Status: DISCONTINUED | OUTPATIENT
Start: 2023-05-08 | End: 2023-05-08 | Stop reason: HOSPADM

## 2023-05-07 RX ORDER — METOPROLOL TARTRATE 25 MG/1
12.5 TABLET ORAL 2 TIMES DAILY
Status: DISCONTINUED | OUTPATIENT
Start: 2023-05-08 | End: 2023-05-08 | Stop reason: HOSPADM

## 2023-05-07 RX ORDER — DEXTROSE 40 %
30 GEL (GRAM) ORAL
Status: DISCONTINUED | OUTPATIENT
Start: 2023-05-07 | End: 2023-05-08 | Stop reason: HOSPADM

## 2023-05-07 RX ORDER — ONDANSETRON 8 MG/1
8 TABLET, ORALLY DISINTEGRATING ORAL EVERY 8 HOURS PRN
Status: DISCONTINUED | OUTPATIENT
Start: 2023-05-07 | End: 2023-05-08 | Stop reason: HOSPADM

## 2023-05-07 RX ORDER — PANTOPRAZOLE SODIUM 40 MG/1
40 TABLET, DELAYED RELEASE ORAL DAILY
Status: DISCONTINUED | OUTPATIENT
Start: 2023-05-08 | End: 2023-05-08 | Stop reason: HOSPADM

## 2023-05-07 RX ORDER — GLUCAGON 1 MG
1 KIT INJECTION
Status: DISCONTINUED | OUTPATIENT
Start: 2023-05-07 | End: 2023-05-08 | Stop reason: HOSPADM

## 2023-05-07 RX ORDER — LORAZEPAM 0.5 MG/1
0.5 TABLET ORAL DAILY PRN
Status: DISCONTINUED | OUTPATIENT
Start: 2023-05-07 | End: 2023-05-08 | Stop reason: HOSPADM

## 2023-05-07 RX ORDER — DEXTROSE 40 %
15 GEL (GRAM) ORAL
Status: DISCONTINUED | OUTPATIENT
Start: 2023-05-07 | End: 2023-05-08 | Stop reason: HOSPADM

## 2023-05-07 RX ORDER — POTASSIUM CHLORIDE 20 MEQ/1
40 TABLET, EXTENDED RELEASE ORAL ONCE
Status: DISCONTINUED | OUTPATIENT
Start: 2023-05-07 | End: 2023-05-08 | Stop reason: HOSPADM

## 2023-05-07 RX ADMIN — ONDANSETRON 8 MG: 8 TABLET, ORALLY DISINTEGRATING ORAL at 11:05

## 2023-05-07 RX ADMIN — SODIUM CHLORIDE, POTASSIUM CHLORIDE, SODIUM LACTATE AND CALCIUM CHLORIDE 2232 ML: 600; 310; 30; 20 INJECTION, SOLUTION INTRAVENOUS at 08:05

## 2023-05-07 RX ADMIN — MAGNESIUM SULFATE 2 G: 2 INJECTION INTRAVENOUS at 09:05

## 2023-05-07 RX ADMIN — METHOCARBAMOL 500 MG: 500 TABLET ORAL at 11:05

## 2023-05-07 RX ADMIN — CEFTRIAXONE 1 G: 1 INJECTION, POWDER, FOR SOLUTION INTRAMUSCULAR; INTRAVENOUS at 09:05

## 2023-05-07 NOTE — Clinical Note
Diagnosis: Urinary tract infection without hematuria, site unspecified [[id number]]   Future Attending Provider: CARMEN SERNA [[id number]]   Admitting Provider:: CARMEN SERNA [[id number]]   Special Needs:: No Special Needs [1]

## 2023-05-08 VITALS
WEIGHT: 164 LBS | HEIGHT: 69 IN | OXYGEN SATURATION: 97 % | RESPIRATION RATE: 18 BRPM | SYSTOLIC BLOOD PRESSURE: 133 MMHG | BODY MASS INDEX: 24.29 KG/M2 | DIASTOLIC BLOOD PRESSURE: 81 MMHG | TEMPERATURE: 99 F | HEART RATE: 70 BPM

## 2023-05-08 LAB
ANION GAP SERPL CALC-SCNC: 7 MMOL/L (ref 8–16)
ASCENDING AORTA: 3.87 CM
AV INDEX (PROSTH): 0.81
AV MEAN GRADIENT: 3 MMHG
AV PEAK GRADIENT: 5 MMHG
AV VALVE AREA: 2.55 CM2
AV VELOCITY RATIO: 0.91
BACTERIA UR CULT: NORMAL
BASOPHILS # BLD AUTO: 0.03 K/UL (ref 0–0.2)
BASOPHILS NFR BLD: 0.3 % (ref 0–1.9)
BSA FOR ECHO PROCEDURE: 1.9 M2
BUN SERPL-MCNC: 5 MG/DL (ref 8–23)
CALCIUM SERPL-MCNC: 8.7 MG/DL (ref 8.7–10.5)
CHLORIDE SERPL-SCNC: 108 MMOL/L (ref 95–110)
CO2 SERPL-SCNC: 25 MMOL/L (ref 23–29)
CREAT SERPL-MCNC: 0.6 MG/DL (ref 0.5–1.4)
CV ECHO LV RWT: 0.44 CM
DIFFERENTIAL METHOD: ABNORMAL
DOP CALC AO PEAK VEL: 1.11 M/S
DOP CALC AO VTI: 23.1 CM
DOP CALC LVOT AREA: 3.1 CM2
DOP CALC LVOT DIAMETER: 2 CM
DOP CALC LVOT PEAK VEL: 1.01 M/S
DOP CALC LVOT STROKE VOLUME: 58.84 CM3
DOP CALCLVOT PEAK VEL VTI: 18.74 CM
E WAVE DECELERATION TIME: 277.28 MSEC
E/A RATIO: 0.64
E/E' RATIO: 10.17 M/S
ECHO LV POSTERIOR WALL: 1 CM (ref 0.6–1.1)
EJECTION FRACTION: 52 %
EOSINOPHIL # BLD AUTO: 0.1 K/UL (ref 0–0.5)
EOSINOPHIL NFR BLD: 1.3 % (ref 0–8)
ERYTHROCYTE [DISTWIDTH] IN BLOOD BY AUTOMATED COUNT: 12.5 % (ref 11.5–14.5)
EST. GFR  (NO RACE VARIABLE): >60 ML/MIN/1.73 M^2
ESTIMATED AVG GLUCOSE: 91 MG/DL (ref 68–131)
FRACTIONAL SHORTENING: 39 % (ref 28–44)
GLUCOSE SERPL-MCNC: 96 MG/DL (ref 70–110)
HBA1C MFR BLD: 4.8 % (ref 4–5.6)
HCT VFR BLD AUTO: 41.6 % (ref 37–48.5)
HGB BLD-MCNC: 14 G/DL (ref 12–16)
IMM GRANULOCYTES # BLD AUTO: 0.04 K/UL (ref 0–0.04)
IMM GRANULOCYTES NFR BLD AUTO: 0.4 % (ref 0–0.5)
INTERVENTRICULAR SEPTUM: 0.96 CM (ref 0.6–1.1)
LA MAJOR: 5.78 CM
LA MINOR: 4.93 CM
LA WIDTH: 2.86 CM
LEFT ATRIUM SIZE: 3.93 CM
LEFT ATRIUM VOLUME INDEX: 26.8 ML/M2
LEFT ATRIUM VOLUME: 50.84 CM3
LEFT INTERNAL DIMENSION IN SYSTOLE: 2.82 CM (ref 2.1–4)
LEFT VENTRICLE DIASTOLIC VOLUME INDEX: 50.92 ML/M2
LEFT VENTRICLE DIASTOLIC VOLUME: 96.75 ML
LEFT VENTRICLE MASS INDEX: 81 G/M2
LEFT VENTRICLE SYSTOLIC VOLUME INDEX: 15.9 ML/M2
LEFT VENTRICLE SYSTOLIC VOLUME: 30.15 ML
LEFT VENTRICULAR INTERNAL DIMENSION IN DIASTOLE: 4.59 CM (ref 3.5–6)
LEFT VENTRICULAR MASS: 153.94 G
LV LATERAL E/E' RATIO: 10.17 M/S
LV SEPTAL E/E' RATIO: 10.17 M/S
LYMPHOCYTES # BLD AUTO: 0.8 K/UL (ref 1–4.8)
LYMPHOCYTES NFR BLD: 8 % (ref 18–48)
MAGNESIUM SERPL-MCNC: 2 MG/DL (ref 1.6–2.6)
MCH RBC QN AUTO: 31.6 PG (ref 27–31)
MCHC RBC AUTO-ENTMCNC: 33.7 G/DL (ref 32–36)
MCV RBC AUTO: 94 FL (ref 82–98)
MONOCYTES # BLD AUTO: 0.5 K/UL (ref 0.3–1)
MONOCYTES NFR BLD: 4.3 % (ref 4–15)
MV PEAK A VEL: 0.95 M/S
MV PEAK E VEL: 0.61 M/S
MV STENOSIS PRESSURE HALF TIME: 80.41 MS
MV VALVE AREA P 1/2 METHOD: 2.74 CM2
NEUTROPHILS # BLD AUTO: 9 K/UL (ref 1.8–7.7)
NEUTROPHILS NFR BLD: 85.7 % (ref 38–73)
NRBC BLD-RTO: 0 /100 WBC
PHOSPHATE SERPL-MCNC: 3.7 MG/DL (ref 2.7–4.5)
PISA TR MAX VEL: 2.59 M/S
PLATELET # BLD AUTO: 240 K/UL (ref 150–450)
PMV BLD AUTO: 9.3 FL (ref 9.2–12.9)
POTASSIUM SERPL-SCNC: 4 MMOL/L (ref 3.5–5.1)
RA MAJOR: 4.65 CM
RA PRESSURE: 3 MMHG
RA WIDTH: 3.48 CM
RBC # BLD AUTO: 4.43 M/UL (ref 4–5.4)
RIGHT VENTRICULAR END-DIASTOLIC DIMENSION: 3.24 CM
SINUS: 3.5 CM
SODIUM SERPL-SCNC: 140 MMOL/L (ref 136–145)
STJ: 3.86 CM
TDI LATERAL: 0.06 M/S
TDI SEPTAL: 0.06 M/S
TDI: 0.06 M/S
TR MAX PG: 27 MMHG
TV REST PULMONARY ARTERY PRESSURE: 30 MMHG
WBC # BLD AUTO: 10.53 K/UL (ref 3.9–12.7)

## 2023-05-08 PROCEDURE — G0378 HOSPITAL OBSERVATION PER HR: HCPCS

## 2023-05-08 PROCEDURE — 85025 COMPLETE CBC W/AUTO DIFF WBC: CPT | Performed by: PHYSICIAN ASSISTANT

## 2023-05-08 PROCEDURE — 83036 HEMOGLOBIN GLYCOSYLATED A1C: CPT | Performed by: PHYSICIAN ASSISTANT

## 2023-05-08 PROCEDURE — 84100 ASSAY OF PHOSPHORUS: CPT | Performed by: PHYSICIAN ASSISTANT

## 2023-05-08 PROCEDURE — 63600175 PHARM REV CODE 636 W HCPCS: Performed by: PHYSICIAN ASSISTANT

## 2023-05-08 PROCEDURE — 25000003 PHARM REV CODE 250: Performed by: PHYSICIAN ASSISTANT

## 2023-05-08 PROCEDURE — 25500020 PHARM REV CODE 255: Performed by: INTERNAL MEDICINE

## 2023-05-08 PROCEDURE — 83735 ASSAY OF MAGNESIUM: CPT | Performed by: PHYSICIAN ASSISTANT

## 2023-05-08 PROCEDURE — 80048 BASIC METABOLIC PNL TOTAL CA: CPT | Performed by: PHYSICIAN ASSISTANT

## 2023-05-08 PROCEDURE — 99239 HOSP IP/OBS DSCHRG MGMT >30: CPT | Mod: ,,,

## 2023-05-08 PROCEDURE — 99239 PR HOSPITAL DISCHARGE DAY,>30 MIN: ICD-10-PCS | Mod: ,,,

## 2023-05-08 RX ORDER — NITROFURANTOIN 25; 75 MG/1; MG/1
100 CAPSULE ORAL 2 TIMES DAILY
Qty: 10 CAPSULE | Refills: 0 | Status: SHIPPED | OUTPATIENT
Start: 2023-05-08 | End: 2023-05-13

## 2023-05-08 RX ORDER — CALCIUM CARBONATE 200(500)MG
2 TABLET,CHEWABLE ORAL EVERY 8 HOURS PRN
COMMUNITY

## 2023-05-08 RX ORDER — ASPIRIN 325 MG
650 TABLET, DELAYED RELEASE (ENTERIC COATED) ORAL DAILY PRN
COMMUNITY
End: 2023-05-08

## 2023-05-08 RX ORDER — ACETAMINOPHEN 500 MG
1000 TABLET ORAL EVERY 8 HOURS PRN
COMMUNITY

## 2023-05-08 RX ORDER — METOPROLOL TARTRATE 25 MG/1
12.5 TABLET, FILM COATED ORAL 2 TIMES DAILY
Qty: 30 TABLET | Refills: 11 | Status: SHIPPED | OUTPATIENT
Start: 2023-05-08 | End: 2023-05-15

## 2023-05-08 RX ORDER — ADHESIVE BANDAGE
30 BANDAGE TOPICAL DAILY PRN
COMMUNITY

## 2023-05-08 RX ORDER — PANTOPRAZOLE SODIUM 40 MG/1
40 TABLET, DELAYED RELEASE ORAL DAILY
Qty: 90 TABLET | Refills: 3 | Status: SHIPPED | OUTPATIENT
Start: 2023-05-08 | End: 2023-07-05

## 2023-05-08 RX ORDER — IBUPROFEN 200 MG
400-600 TABLET ORAL EVERY 8 HOURS PRN
COMMUNITY

## 2023-05-08 RX ORDER — PHENAZOPYRIDINE HYDROCHLORIDE 200 MG/1
200 TABLET, FILM COATED ORAL 3 TIMES DAILY PRN
Qty: 15 TABLET | Refills: 0 | Status: SHIPPED | OUTPATIENT
Start: 2023-05-08 | End: 2023-05-12

## 2023-05-08 RX ADMIN — SODIUM CHLORIDE, POTASSIUM CHLORIDE, SODIUM LACTATE AND CALCIUM CHLORIDE: 600; 310; 30; 20 INJECTION, SOLUTION INTRAVENOUS at 12:05

## 2023-05-08 RX ADMIN — ASPIRIN 81 MG: 81 TABLET, CHEWABLE ORAL at 08:05

## 2023-05-08 RX ADMIN — METOPROLOL TARTRATE 12.5 MG: 25 TABLET, FILM COATED ORAL at 08:05

## 2023-05-08 RX ADMIN — METHOCARBAMOL 500 MG: 500 TABLET ORAL at 08:05

## 2023-05-08 RX ADMIN — PRAVASTATIN SODIUM 80 MG: 80 TABLET ORAL at 08:05

## 2023-05-08 RX ADMIN — HUMAN ALBUMIN MICROSPHERES AND PERFLUTREN 0.66 MG: 10; .22 INJECTION, SOLUTION INTRAVENOUS at 09:05

## 2023-05-08 RX ADMIN — PANTOPRAZOLE SODIUM 40 MG: 40 TABLET, DELAYED RELEASE ORAL at 08:05

## 2023-05-08 RX ADMIN — METOPROLOL TARTRATE 12.5 MG: 25 TABLET, FILM COATED ORAL at 12:05

## 2023-05-08 NOTE — DISCHARGE INSTRUCTIONS
It has been a pleasure caring for you, and I hope you continue to feel better and stronger every day! Please do not hesitate to reach out to me with any questions or concerns.     Allie Sheridan PA-C  [facility]  [phone]

## 2023-05-08 NOTE — PLAN OF CARE
[facility] - Emergency Dept  Initial Discharge Assessment       Primary Care Provider: Pinky Reyes MD    Admission Diagnosis: Urinary tract infection without hematuria, site unspecified [N39.0]    Admission Date: 5/7/2023  Expected Discharge Date: 5/8/2023    Pt stated she is independent with her ADL's and ambulation and does not require assistance or use equipment.      Pt to d/c home with no needs when ready    Discharge Barriers Identified: (P) None    Payor: MEDICARE / Plan: MEDICARE PART A & B / Product Type: Government /     Extended Emergency Contact Information  Primary Emergency Contact: Greg Thomas  Address: [address] [place] [address] [facility]  Home Phone: [phone]  Mobile Phone: [phone]  Relation: Spouse    Discharge Plan A: (P) Home  Discharge Plan B: (P) Home      [id number] - [place] - [address] AT [facility] [address] [place]  Critical access hospital [place] [address]  Phone: [phone] Fax: [phone]      Initial Assessment (most recent)       Adult Discharge Assessment - 05/08/23 1219          Discharge Assessment    Assessment Type Discharge Planning Assessment (P)      Confirmed/corrected address, phone number and insurance Yes (P)      Confirmed Demographics Correct on Facesheet (P)      Source of Information patient (P)      Reason For Admission New onset a-fib (P)      People in Home spouse (P)      Facility Arrived From: home (P)      Do you expect to return to your current living situation? Yes (P)      Do you have help at home or someone to help you manage your care at home? No (P)      Prior to hospitilization cognitive status: Alert/Oriented;No Deficits (P)      Current cognitive status: Alert/Oriented;No Deficits (P)      Home Accessibility stairs to enter home (P)      Number of Stairs, Main Entrance one (P)      Home Layout Able to live on 1st floor (P)      Equipment Currently Used at Home none (P)      Patient currently  being followed by outpatient case management? No (P)      Do you currently have service(s) that help you manage your care at home? No (P)      Do you have any problems affording any of your prescribed medications? No (P)      Is the patient taking medications as prescribed? yes (P)      Who is going to help you get home at discharge? family (P)      How do you get to doctors appointments? car, drives self (P)      Are you on dialysis? No (P)      Do you take coumadin? No (P)      Discharge Plan A Home (P)      Discharge Plan B Home (P)      Discharge Plan discussed with: Patient (P)      Discharge Barriers Identified None (P)         OTHER    Name(s) of People in Home spouse Sig (P)                    Kortney Dao CD, MSW, LMSW, RSW   Case Management  [facility]  Email: [email or web address]

## 2023-05-08 NOTE — PHARMACY MED REC
"Admission Medication History     The home medication history was taken by Katia Graham.    You may go to "Admission" then "Reconcile Home Medications" tabs to review and/or act upon these items.     The home medication list has been updated by the Pharmacy department.   Please read ALL comments highlighted in yellow.   Please address this information as you see fit.    Feel free to contact us if you have any questions or require assistance.      The medications listed below were removed from the home medication list. Please reorder if appropriate:  Patient reports no longer taking the following medication(s):  PANTOPRAZOLE 40 MG    Medications listed below were obtained from: Patient/family    Current Outpatient Medications on File Prior to Encounter   Medication Sig    acetaminophen (TYLENOL) 500 MG tablet Take 1,000 mg by mouth every 8 (eight) hours as needed for Pain. (First choice)    albuterol (PROVENTIL/VENTOLIN HFA) 90 mcg/actuation inhaler Inhale 2 puffs into the lungs every 6 (six) hours as needed for Wheezing. Rescue    ascorbic acid (VITAMIN C ORAL) Take 1 tablet by mouth once daily.    aspirin (ECOTRIN) 325 MG EC tablet Take 650 mg by mouth daily as needed for Pain. (third choice)    aspirin 81 MG Chew Take 81 mg by mouth once daily.    calcium carbonate (TUMS) 200 mg calcium (500 mg) chewable tablet Take 2 tablets by mouth every 8 (eight) hours as needed for Heartburn.    ergocalciferol, vitamin D2, (VITAMIN D ORAL) Take 1 tablet by mouth once daily.    esomeprazole (NEXIUM) 40 MG capsule Take 40 mg by mouth once daily.    estradioL (ESTRACE) 0.01 % (0.1 mg/gram) vaginal cream Place 2 g vaginally twice a week.    ibuprofen (ADVIL,MOTRIN) 200 MG tablet Take 400-600 mg by mouth every 8 (eight) hours as needed for Pain. (Second choice)    L. rhamnosus GG/inulin (CULTURELLE PROBIOTICS ORAL) Take 1 capsule by mouth once daily.    LORazepam (ATIVAN) 0.5 MG tablet TAKE 1 TABLET BY MOUTH EVERY DAY AS NEEDED " FOR ANXIETY    magnesium hydroxide 400 mg/5 ml (MILK OF MAGNESIA) 400 mg/5 mL Susp Take 30 mLs by mouth daily as needed (constipation).    pravastatin (PRAVACHOL) 80 MG tablet Take 1 tablet (80 mg total) by mouth once daily.    ubidecarenone/vitamin E mixed (COQ10  ORAL) Take 1 tablet by mouth once daily.         Katia Graham  EXT [id number]                .

## 2023-05-08 NOTE — SUBJECTIVE & OBJECTIVE
Past Medical History:   Diagnosis Date    Anxiety     GERD (gastroesophageal reflux disease)     Hyperlipidemia        Past Surgical History:   Procedure Laterality Date    APPENDECTOMY      HYSTERECTOMY  1989    TVH ov in situ - due to abnormal Pap smear       Review of patient's allergies indicates:  No Known Allergies    No current facility-administered medications on file prior to encounter.     Current Outpatient Medications on File Prior to Encounter   Medication Sig    albuterol (PROVENTIL/VENTOLIN HFA) 90 mcg/actuation inhaler Inhale 2 puffs into the lungs every 6 (six) hours as needed for Wheezing. Rescue    aspirin 81 MG Chew Take 81 mg by mouth once daily.    esomeprazole (NEXIUM) 40 MG capsule Take 40 mg by mouth.    estradioL (ESTRACE) 0.01 % (0.1 mg/gram) vaginal cream Place 2 g vaginally twice a week.    L. rhamnosus GG/inulin (CULTURELLE PROBIOTICS ORAL) Take by mouth.    LORazepam (ATIVAN) 0.5 MG tablet TAKE 1 TABLET BY MOUTH EVERY DAY AS NEEDED FOR ANXIETY    pantoprazole (PROTONIX) 40 MG tablet Take 1 tablet (40 mg total) by mouth once daily.    pravastatin (PRAVACHOL) 80 MG tablet Take 1 tablet (80 mg total) by mouth once daily.    ubidecarenone/vitamin E mixed (COQ10  ORAL) Take by mouth.     Family History       Problem Relation (Age of Onset)    Alzheimer's disease Mother    Cancer Father    Heart disease Brother    No Known Problems Daughter, Son          Tobacco Use    Smoking status: Every Day     Packs/day: 1.00     Years: 26.00     Pack years: 26.00     Types: Cigarettes    Smokeless tobacco: Never   Substance and Sexual Activity    Alcohol use: Yes     Alcohol/week: 7.0 standard drinks     Types: 7 Glasses of wine per week    Drug use: No    Sexual activity: Yes     Partners: Male     Birth control/protection: None     Comment:       Review of Systems   Constitutional:  Positive for fatigue. Negative for chills and fever.   HENT:  Negative for congestion, trouble swallowing  and voice change.    Eyes:  Negative for photophobia and visual disturbance.   Respiratory:  Negative for cough, chest tightness, shortness of breath and wheezing.    Cardiovascular:  Positive for palpitations. Negative for chest pain and leg swelling.   Gastrointestinal:  Negative for abdominal distention, abdominal pain, constipation, diarrhea, nausea and vomiting.   Genitourinary:  Positive for dysuria. Negative for difficulty urinating, flank pain, frequency and hematuria.   Musculoskeletal:  Positive for back pain and myalgias. Negative for arthralgias and gait problem.   Skin:  Negative for color change and wound.   Neurological:  Positive for weakness. Negative for dizziness, seizures, speech difficulty, light-headedness and headaches.   Psychiatric/Behavioral:  Negative for behavioral problems, confusion and decreased concentration.    Objective:     Vital Signs (Most Recent):  Temp: 98.3 °F (36.8 °C) (05/07/23 1907)  Pulse: 89 (05/07/23 2032)  Resp: 20 (05/07/23 2032)  BP: (!) 159/77 (05/07/23 2032)  SpO2: 96 % (05/07/23 2032) Vital Signs (24h Range):  Temp:  [98.3 °F (36.8 °C)] 98.3 °F (36.8 °C)  Pulse:  [] 89  Resp:  [15-20] 20  SpO2:  [96 %] 96 %  BP: (145-159)/(77-97) 159/77     Weight: 74.4 kg (164 lb)  Body mass index is 24.22 kg/m².     Physical Exam  Vitals and nursing note reviewed.   Constitutional:       General: She is not in acute distress.     Appearance: She is well-developed.   HENT:      Head: Normocephalic and atraumatic.      Mouth/Throat:      Pharynx: No oropharyngeal exudate.   Eyes:      General: No scleral icterus.     Extraocular Movements: Extraocular movements intact.      Conjunctiva/sclera: Conjunctivae normal.   Cardiovascular:      Rate and Rhythm: Normal rate and regular rhythm.      Heart sounds: Normal heart sounds.   Pulmonary:      Effort: Pulmonary effort is normal. No respiratory distress.      Breath sounds: Normal breath sounds. No wheezing.   Abdominal:       General: Bowel sounds are normal. There is no distension.      Palpations: Abdomen is soft.      Tenderness: There is no abdominal tenderness. There is no right CVA tenderness or left CVA tenderness.   Musculoskeletal:         General: No tenderness. Normal range of motion.      Cervical back: Normal range of motion and neck supple.   Lymphadenopathy:      Cervical: No cervical adenopathy.   Skin:     General: Skin is warm and dry.      Capillary Refill: Capillary refill takes less than 2 seconds.      Findings: No rash.   Neurological:      Mental Status: She is alert and oriented to person, place, and time.      Cranial Nerves: No cranial nerve deficit.      Sensory: No sensory deficit.      Coordination: Coordination normal.   Psychiatric:         Behavior: Behavior normal.         Thought Content: Thought content normal.         Judgment: Judgment normal.              Significant Labs: All pertinent labs within the past 24 hours have been reviewed.  CBC:   Recent Labs   Lab 05/07/23 1957   WBC 9.34   HGB 14.9   HCT 44.0        CMP:   Recent Labs   Lab 05/07/23 1957      K 3.5      CO2 23   GLU 99   BUN 6*   CREATININE 0.6   CALCIUM 9.0   PROT 6.7   ALBUMIN 3.9   BILITOT 0.6   ALKPHOS 62   AST 21   ALT 21   ANIONGAP 12       Significant Imaging: I have reviewed all pertinent imaging results/findings within the past 24 hours.

## 2023-05-08 NOTE — DISCHARGE SUMMARY
"[facility] - Emergency Dept  Hospital Medicine  Discharge Summary      Patient Name: Karina Thomas  MRN: [id number]  TARA: [id number]  Patient Class: OP- Observation  Admission Date: 5/7/2023  Hospital Length of Stay: 0 days  Discharge Date and Time: 5/8/2023  1:15 PM  Attending Physician: Mary att. providers found   Discharging Provider: Allie Sheridan PA-C  Primary Care Provider: Pinky Reyes MD  Hospital Medicine Team: [facility] MED E Allie Sheridan PA-C  Primary Care Team: [facility] MED E    HPI:   Karina Thomas is a 68 y.o. female with a PMHx of GERD, anxiety, HLD who presents to [facility] for evaluation of UTI symptoms and palpitations. Patient reports dysuria with associated increased fatigue, malaise, generalized weakness, and nausea for the past day. Symptoms felt similar to prior UTIs in the past so she took Azo and an old antibiotic without any improvement. Endorses associated palpitations which she attributes to anxiety. She feels like her body is "achy" and some pain/ soreness to her back but denies any flank pain. Denies fever, chills, chest pain, SOB, LE swelling, vomiting, abdominal pain, flank pain, HA, vision changes or syncope.     ED: mildly tachycardic to 108 and noted to be in Afib on EKG. Converted to NSR s/p 30ml/kg sepsis bolus. No leukocytosis. Mg 1.5. Given IV CTX 1g and 2g IV Mg.      * No surgery found *      Hospital Course:   Pt placed in observation for a brief episode of questionable A fib in the setting of UTI and hypomagnesemia. The patient was given IV magnesium, IVFs, and ceftriaxone and spontaneously converted to NSR without beta blockers or anti-arrhythmics. Echo with EF 50-55% and low normal systolic and normal diastolic function. The patient reports significant improvement in symptoms on my evaluation and was deemed medically ready for discharge. All questions were answered. Patient acknowledged understanding of discharge instructions and feels safe to discharge home. Patient was discharged on " 5/8/2023 in stable condition with antibiotics, a holter monitor, PCP, and cardiology follow-up. Return precautions given.        Goals of Care Treatment Preferences:  Code Status: Full Code      Consults:     No new Assessment & Plan notes have been filed under this hospital service since the last note was generated.  Service: Hospital Medicine    Final Active Diagnoses:    Diagnosis Date Noted POA    PRINCIPAL PROBLEM:  New onset a-fib [I48.91] 05/07/2023 Yes    Urinary tract infection without hematuria [N39.0] 05/07/2023 Yes    Hypomagnesemia [E83.42] 05/07/2023 Yes    Hyperlipidemia [E78.5]  Yes    GERD (gastroesophageal reflux disease) [K21.9]  Yes    Anxiety [F41.9]  Yes      Problems Resolved During this Admission:       Discharged Condition: good    Disposition: Home or Self Care    Follow Up:    Patient Instructions:      Ambulatory referral/consult to Internal Medicine   Standing Status: Future   Referral Priority: Urgent Referral Type: Consultation   Referral Reason: Specialty Services Required   Requested Specialty: Internal Medicine   Number of Visits Requested: 1     ACCEPT - Ambulatory referral/consult to Cardiac Electrophysiology   Standing Status: Future   Referral Priority: Urgent Referral Type: Consultation   Referral Reason: Specialty Services Required   Requested Specialty: Cardiology   Number of Visits Requested: 1     Holter monitor - 48 hour   Standing Status: Future Standing Exp. Date: 05/08/24     Order Specific Question Answer Comments   Release to patient Immediate        Significant Diagnostic Studies:  Results for orders placed during the hospital encounter of 05/07/23    Echo    Interpretation Summary  · The left ventricle is normal in size with concentric remodeling and low normal systolic function.  · The estimated ejection fraction is 50-55%.  · Normal left ventricular diastolic function.  · Normal right ventricular size with normal right ventricular systolic function.  · Mild  tricuspid regurgitation.  · The estimated PA systolic pressure is 30 mmHg.  · Normal central venous pressure (3 mmHg).      Pending Diagnostic Studies:     None         Medications:  Reconciled Home Medications:      Medication List      START taking these medications    metoprolol tartrate 25 MG tablet  Commonly known as: LOPRESSOR  Take 0.5 tablets (12.5 mg total) by mouth 2 (two) times daily.     nitrofurantoin (macrocrystal-monohydrate) 100 MG capsule  Commonly known as: MACROBID  Take 1 capsule (100 mg total) by mouth 2 (two) times daily. for 5 days     phenazopyridine 200 MG tablet  Commonly known as: PYRIDIUM  Take 1 tablet (200 mg total) by mouth 3 (three) times daily as needed (pelvic pain with UTI).        CHANGE how you take these medications    aspirin 81 MG Chew  Take 81 mg by mouth once daily.  What changed: Another medication with the same name was removed. Continue taking this medication, and follow the directions you see here.        CONTINUE taking these medications    acetaminophen 500 MG tablet  Commonly known as: TYLENOL  Take 1,000 mg by mouth every 8 (eight) hours as needed for Pain. (First choice)     albuterol 90 mcg/actuation inhaler  Commonly known as: PROVENTIL/VENTOLIN HFA  Inhale 2 puffs into the lungs every 6 (six) hours as needed for Wheezing. Rescue     calcium carbonate 200 mg calcium (500 mg) chewable tablet  Commonly known as: TUMS  Take 2 tablets by mouth every 8 (eight) hours as needed for Heartburn.     COQ10  ORAL  Take 1 tablet by mouth once daily.     CULTURELLE PROBIOTICS ORAL  Take 1 capsule by mouth once daily.     esomeprazole 40 MG capsule  Commonly known as: NEXIUM  Take 40 mg by mouth once daily.     estradioL 0.01 % (0.1 mg/gram) vaginal cream  Commonly known as: ESTRACE  Place 2 g vaginally twice a week.     ibuprofen 200 MG tablet  Commonly known as: ADVIL,MOTRIN  Take 400-600 mg by mouth every 8 (eight) hours as needed for Pain. (Second choice)     LORazepam  0.5 MG tablet  Commonly known as: ATIVAN  TAKE 1 TABLET BY MOUTH EVERY DAY AS NEEDED FOR ANXIETY     MILK OF MAGNESIA 400 mg/5 mL Susp  Generic drug: magnesium hydroxide 400 mg/5 ml  Take 30 mLs by mouth daily as needed (constipation).     pantoprazole 40 MG tablet  Commonly known as: PROTONIX  Take 1 tablet (40 mg total) by mouth once daily.     pravastatin 80 MG tablet  Commonly known as: PRAVACHOL  Take 1 tablet (80 mg total) by mouth once daily.     VITAMIN C ORAL  Take 1 tablet by mouth once daily.     VITAMIN D ORAL  Take 1 tablet by mouth once daily.            Indwelling Lines/Drains at time of discharge:   Lines/Drains/Airways     None                 Time spent on the discharge of patient: 36 minutes         Allie Sheridan PA-C  Department of Hospital Medicine  [facility] - Emergency Dept

## 2023-05-08 NOTE — ASSESSMENT & PLAN NOTE
- likely triggered by UTI, dehydration and hypoMg  - converted to NSR s/p antibiotics and IVFs  - [id number] Score 3, however will hold on anticoagulation as converted to NSR spontaneously  - start PO lopressor 12.5mg BID  - repeat TTE  - treat UTI  - cIVFs overnight  - keep K>4, Mg>2  - referral to cardiology on dischagre

## 2023-05-08 NOTE — ED PROVIDER NOTES
Chief Complaint   Urinary Tract Infection (+burning +pain with urination. Taking an old antibiotic for symptoms ) and Weakness (Weak x 1 day. )      History Of Present Illness   Karina Thomas is a 68 y.o. female presenting with burning dysuria that has been present today.  It felt a little better after taking some old antibiotics and old azathioprine.  However, she is also felt weak all day since she first noticed the burning.  She feels achy. No shortness of breath.  No fever or shaking chills.  She has had multiple urinary tract infections in the past.  No abnormal bowel movements.  She had mild nausea, but took some Pepto-Bismol for that.    History obtained from: Patient    Review of patient's allergies indicates:  No Known Allergies    No current facility-administered medications on file prior to encounter.     Current Outpatient Medications on File Prior to Encounter   Medication Sig Dispense Refill    albuterol (PROVENTIL/VENTOLIN HFA) 90 mcg/actuation inhaler Inhale 2 puffs into the lungs every 6 (six) hours as needed for Wheezing. Rescue 54 g 0    aspirin 81 MG Chew Take 81 mg by mouth once daily.      esomeprazole (NEXIUM) 40 MG capsule Take 40 mg by mouth.      estradioL (ESTRACE) 0.01 % (0.1 mg/gram) vaginal cream Place 2 g vaginally twice a week. 42.5 g 3    L. rhamnosus GG/inulin (CULTURELLE PROBIOTICS ORAL) Take by mouth.      LORazepam (ATIVAN) 0.5 MG tablet TAKE 1 TABLET BY MOUTH EVERY DAY AS NEEDED FOR ANXIETY 30 tablet 0    pantoprazole (PROTONIX) 40 MG tablet Take 1 tablet (40 mg total) by mouth once daily. 90 tablet 3    pravastatin (PRAVACHOL) 80 MG tablet Take 1 tablet (80 mg total) by mouth once daily. 90 tablet 0    ubidecarenone/vitamin E mixed (COQ10  ORAL) Take by mouth.         Past History   As per HPI and below:  Past Medical History:   Diagnosis Date    Anxiety     GERD (gastroesophageal reflux disease)     Hyperlipidemia      Past Surgical History:   Procedure Laterality Date     APPENDECTOMY      HYSTERECTOMY  1989    TVH ov in situ - due to abnormal Pap smear       Social History     Socioeconomic History    Marital status:    Occupational History    Occupation: retired    Tobacco Use    Smoking status: Every Day     Packs/day: 1.00     Years: 26.00     Pack years: 26.00     Types: Cigarettes    Smokeless tobacco: Never   Substance and Sexual Activity    Alcohol use: Yes     Alcohol/week: 7.0 standard drinks     Types: 7 Glasses of wine per week    Drug use: No    Sexual activity: Yes     Partners: Male     Birth control/protection: None     Comment:     Social History Narrative    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     Walks about 2 days a week. Has a FITBIT and goes 10k steps every day.      Social Determinants of Health     Financial Resource Strain: Low Risk     Difficulty of Paying Living Expenses: Not hard at all   Food Insecurity: No Food Insecurity    Worried About Running Out of Food in the Last Year: Never true    Ran Out of Food in the Last Year: Never true   Transportation Needs: No Transportation Needs    Lack of Transportation (Medical): No    Lack of Transportation (Non-Medical): No   Physical Activity: Insufficiently Active    Days of Exercise per Week: 2 days    Minutes of Exercise per Session: 20 min   Stress: Stress Concern Present    Feeling of Stress : To some extent   Social Connections: Unknown    Frequency of Communication with Friends and Family: More than three times a week    Frequency of Social Gatherings with Friends and Family: Twice a week    Active Member of Clubs or Organizations: No    Attends Club or Organization Meetings: Patient refused    Marital Status:    Housing Stability: Low Risk     Unable to Pay for Housing in the Last Year: No    Number of Places Lived in the Last Year: 1    Unstable Housing in the Last Year: No       Family History   Problem Relation Age of Onset    Cancer Father         unknown CA    Alzheimer's  disease Mother     Heart disease Brother         cad s/p stent 64    No Known Problems Daughter     No Known Problems Son     Breast cancer Neg Hx     Colon cancer Neg Hx     Ovarian cancer Neg Hx        Physical Exam     Vitals:    05/07/23 1907 05/07/23 2032   BP: (!) 145/97 (!) 159/77   BP Location: Right arm Left arm   Patient Position: Sitting Lying   Pulse: 108 89   Resp: 15 20   Temp: 98.3 °F (36.8 °C)    TempSrc: Oral    SpO2: 96% 96%   Weight: 74.4 kg (164 lb)      Appearance: No acute distress.  Skin: No rashes seen.  Good turgor.  No abrasions.  No ecchymoses.  Eyes: No conjunctival injection.  ENT: Oropharynx clear.    Chest: Clear to auscultation bilaterally.  Good air movement.  No wheezes.  No rhonchi.  Cardiovascular: Tachycardic, regular.  No murmurs. No gallops. No rubs.  Abdomen: Soft.  Not distended.  Nontender.  No guarding.  No rebound.  Musculoskeletal: Good range of motion all joints.  No deformities.  Neck supple.  No meningismus.  Neurologic: Motor intact.  Sensation intact.  Cerebellar intact.  Cranial nerves intact.  Mental Status:  Alert and oriented x 3.  Appropriate, conversant.      Initial MDM   Dysuria, systemic symptoms, tachycardia on arrival.  Concern for urosepsis, UTI, viral syndrome, cystitis plus dehydration.  Will give fluids, do septic workup and reassess.  Possible hospitalization based on labs.    Medications Given     Medications   cefTRIAXone (ROCEPHIN) 1 g in dextrose 5 % in water (D5W) 5 % 50 mL IVPB (MB+) (1 g Intravenous New Bag 5/7/23 2116)   magnesium sulfate 2g in water 50mL IVPB (premix) (2 g Intravenous New Bag 5/7/23 2118)   melatonin tablet 6 mg (has no administration in time range)   ondansetron disintegrating tablet 8 mg (has no administration in time range)   promethazine tablet 25 mg (has no administration in time range)   polyethylene glycol packet 17 g (has no administration in time range)   bisacodyL suppository 10 mg (has no administration in time  range)   acetaminophen tablet 650 mg (has no administration in time range)   glucagon (human recombinant) injection 1 mg (has no administration in time range)   nicotine 14 mg/24 hr 1 patch (has no administration in time range)   dextrose 10% bolus 125 mL 125 mL (has no administration in time range)   dextrose 10% bolus 250 mL 250 mL (has no administration in time range)   dextrose 40 % gel 15,000 mg (has no administration in time range)   dextrose 40 % gel 30,000 mg (has no administration in time range)   lactated ringers bolus 2,232 mL (2,232 mLs Intravenous New Bag 5/7/23 2019)       Results and Course     Labs Reviewed   CBC W/ AUTO DIFFERENTIAL - Abnormal; Notable for the following components:       Result Value    MCH 32.4 (*)     MPV 9.0 (*)     Gran # (ANC) 8.5 (*)     Lymph # 0.4 (*)     Gran % 91.2 (*)     Lymph % 4.2 (*)     Mono % 3.7 (*)     All other components within normal limits   COMPREHENSIVE METABOLIC PANEL - Abnormal; Notable for the following components:    BUN 6 (*)     All other components within normal limits   URINALYSIS, REFLEX TO URINE CULTURE - Abnormal; Notable for the following components:    Occult Blood UA Trace (*)     Leukocytes, UA 2+ (*)     All other components within normal limits    Narrative:     Specimen Source->Urine   MAGNESIUM - Abnormal; Notable for the following components:    Magnesium 1.5 (*)     All other components within normal limits   URINALYSIS MICROSCOPIC - Abnormal; Notable for the following components:    WBC, UA 28 (*)     All other components within normal limits    Narrative:     Specimen Source->Urine   CULTURE, BLOOD   CULTURE, BLOOD   CULTURE, URINE   PHOSPHORUS   SARS-COV-2 RNA AMPLIFICATION, QUAL   ISTAT LACTATE       Imaging Results              X-Ray Chest AP Portable (Final result)  Result time 05/07/23 20:58:34      Final result by Jeffry Scruggs MD (05/07/23 20:58:34)                   Impression:      No acute findings.  Mild prominence of  the right hilum, similar compared to prior study.    No significant change from prior study.      Electronically signed by: Jeffry Scruggs MD  Date:    05/07/2023  Time:    20:58               Narrative:    EXAMINATION:  XR CHEST AP PORTABLE    CLINICAL HISTORY:  Sepsis;    TECHNIQUE:  Single frontal view of the chest was performed.    COMPARISON:  11/19/2018.    FINDINGS:  Mild prominence of the right hilum, similar compared to prior study.    Heart and lungs  appear unchanged when allowing for differences in technique and positioning.                                      ED Course as of 05/07/23 2129   Sun May 07, 2023   2015 EKG 12-lead  Atrial fibrillation, rate 90's, no acute ischemia per my independent interpretation.     [DC]   2015 POC Lactate: 1.69 [DC]   2015 WBC: 9.34 [DC]   2015 Hemoglobin: 14.9 [DC]   2015 Platelets: 227 [DC]   2017 WBC, UA(!): 28 [DC]   2019 Leukocytes, UA(!): 2+ [DC]   2040 Magnesium(!): 1.5 [DC]   2040 Phosphorus: 3.1 [DC]   2040 Creatinine: 0.6 [DC]   2049 X-Ray Chest AP Portable  CXR shows no acute disease per my independent interpretation.     [DC]   2116 SARS-CoV-2 RNA, Amplification, Qual: Negative [DC]      ED Course User Index  [DC] Csear Santos MD           MDM, Impression and Plan   68 y.o. female with UTI, possible atrial fibrillation.  Initial EKG was technically poor but did look irregular.  Repeat EKGs appear normal sinus in her much more regular.  She was having palpitations.  Given the likely new onset AFib in the setting of UTI, will place in observation.  Discussed with hospital medicine.  Rate has been controlled throughout.  Magnesium replaced.         Final diagnoses:  [R00.0] Tachycardia  [I48.91] Atrial fibrillation  [N39.0] Urinary tract infection without hematuria, site unspecified (Primary)        ED Disposition Condition    Observation                   Cesar Santos MD  05/07/23 2130

## 2023-05-08 NOTE — PLAN OF CARE
05/08/23 1221   Post-Acute Status   Post-Acute Authorization Other   Other Status No Post-Acute Service Needs   Discharge Delays None known at this time   Discharge Plan   Discharge Plan A Home     No post acute services required at this time      Kortney Dao CD, MSW, LMSW, RSW   Case Management  [facility]  Email: [email or web address]

## 2023-05-08 NOTE — ED TRIAGE NOTES
Karina Thomas, a 68 y.o. female presents to the ED w/ complaint of increased fatigue, weakness, and burning with urination since this morning. Pt states thought she had a UTI and took Azo earlier today with no relief. Now saying she feels much worse. Also reports palpitations and nausea but denies vomiting. Thinks palpitations are from anxiety. Denies CP. Denies fever or chills     Triage note:  Chief Complaint   Patient presents with    Urinary Tract Infection     +burning +pain with urination. Taking an old antibiotic for symptoms     Weakness     Weak x 1 day.      Review of patient's allergies indicates:  No Known Allergies  Past Medical History:   Diagnosis Date    Anxiety     GERD (gastroesophageal reflux disease)     Hyperlipidemia

## 2023-05-08 NOTE — HOSPITAL COURSE
Pt placed in observation for a brief episode of questionable A fib in the setting of UTI and hypomagnesemia. The patient was given IV magnesium, IVFs, and ceftriaxone and spontaneously converted to NSR without beta blockers or anti-arrhythmics. Echo with EF 50-55% and low normal systolic and normal diastolic function. The patient reports significant improvement in symptoms on my evaluation and was deemed medically ready for discharge. All questions were answered. Patient acknowledged understanding of discharge instructions and feels safe to discharge home. Patient was discharged on 5/8/2023 in stable condition with antibiotics, a holter monitor, PCP, and cardiology follow-up. Return precautions given.

## 2023-05-08 NOTE — ED NOTES
Pt SpO2 dropping to 92% on RA multiple times. Placed on 2L NC. SpO2 improved to 96%. Pt resting in bed looking at phone, NADN. Bed locked & in the lowest position, rails up x2, call button in reach.

## 2023-05-08 NOTE — H&P
"[facility] - Emergency Dept  [facility]  History & Physical    Patient Name: Karina Thomas  MRN: [id number]  Patient Class: OP- Observation  Admission Date: 5/7/2023  Attending Physician: Nehemiah Dixon MD   Primary Care Provider: Pinky Reyes MD         Patient information was obtained from patient, past medical records and ER records.     Subjective:     Principal Problem:New onset a-fib    Chief Complaint:   Chief Complaint   Patient presents with    Urinary Tract Infection     +burning +pain with urination. Taking an old antibiotic for symptoms     Weakness     Weak x 1 day.         HPI: Karina Thomas is a 68 y.o. female with a PMHx of GERD, anxiety, HLD who presents to [facility] for evaluation of UTI symptoms and palpitations. Patient reports dysuria with associated increased fatigue, malaise, generalized weakness, and nausea for the past day. Symptoms felt similar to prior UTIs in the past so she took Azo and an old antibiotic without any improvement. Endorses associated palpitations which she attributes to anxiety. She feels like her body is "achy" and some pain/ soreness to her back but denies any flank pain. Denies fever, chills, chest pain, SOB, LE swelling, vomiting, abdominal pain, flank pain, HA, vision changes or syncope.     ED: mildly tachycardic to 108 and noted to be in Afib on EKG. Converted to NSR s/p 30ml/kg sepsis bolus. No leukocytosis. Mg 1.5. Given IV CTX 1g and 2g IV Mg.      Past Medical History:   Diagnosis Date    Anxiety     GERD (gastroesophageal reflux disease)     Hyperlipidemia        Past Surgical History:   Procedure Laterality Date    APPENDECTOMY      HYSTERECTOMY  1989    TVH ov in situ - due to abnormal Pap smear       Review of patient's allergies indicates:  No Known Allergies    No current facility-administered medications on file prior to encounter.     Current Outpatient Medications on File Prior to Encounter   Medication Sig    albuterol (PROVENTIL/VENTOLIN HFA) 90 " mcg/actuation inhaler Inhale 2 puffs into the lungs every 6 (six) hours as needed for Wheezing. Rescue    aspirin 81 MG Chew Take 81 mg by mouth once daily.    esomeprazole (NEXIUM) 40 MG capsule Take 40 mg by mouth.    estradioL (ESTRACE) 0.01 % (0.1 mg/gram) vaginal cream Place 2 g vaginally twice a week.    L. rhamnosus GG/inulin (CULTURELLE PROBIOTICS ORAL) Take by mouth.    LORazepam (ATIVAN) 0.5 MG tablet TAKE 1 TABLET BY MOUTH EVERY DAY AS NEEDED FOR ANXIETY    pantoprazole (PROTONIX) 40 MG tablet Take 1 tablet (40 mg total) by mouth once daily.    pravastatin (PRAVACHOL) 80 MG tablet Take 1 tablet (80 mg total) by mouth once daily.    ubidecarenone/vitamin E mixed (COQ10  ORAL) Take by mouth.     Family History       Problem Relation (Age of Onset)    Alzheimer's disease Mother    Cancer Father    Heart disease Brother    No Known Problems Daughter, Son          Tobacco Use    Smoking status: Every Day     Packs/day: 1.00     Years: 26.00     Pack years: 26.00     Types: Cigarettes    Smokeless tobacco: Never   Substance and Sexual Activity    Alcohol use: Yes     Alcohol/week: 7.0 standard drinks     Types: 7 Glasses of wine per week    Drug use: No    Sexual activity: Yes     Partners: Male     Birth control/protection: None     Comment:       Review of Systems   Constitutional:  Positive for fatigue. Negative for chills and fever.   HENT:  Negative for congestion, trouble swallowing and voice change.    Eyes:  Negative for photophobia and visual disturbance.   Respiratory:  Negative for cough, chest tightness, shortness of breath and wheezing.    Cardiovascular:  Positive for palpitations. Negative for chest pain and leg swelling.   Gastrointestinal:  Negative for abdominal distention, abdominal pain, constipation, diarrhea, nausea and vomiting.   Genitourinary:  Positive for dysuria. Negative for difficulty urinating, flank pain, frequency and hematuria.   Musculoskeletal:   Positive for back pain and myalgias. Negative for arthralgias and gait problem.   Skin:  Negative for color change and wound.   Neurological:  Positive for weakness. Negative for dizziness, seizures, speech difficulty, light-headedness and headaches.   Psychiatric/Behavioral:  Negative for behavioral problems, confusion and decreased concentration.    Objective:     Vital Signs (Most Recent):  Temp: 98.3 °F (36.8 °C) (05/07/23 1907)  Pulse: 89 (05/07/23 2032)  Resp: 20 (05/07/23 2032)  BP: (!) 159/77 (05/07/23 2032)  SpO2: 96 % (05/07/23 2032) Vital Signs (24h Range):  Temp:  [98.3 °F (36.8 °C)] 98.3 °F (36.8 °C)  Pulse:  [] 89  Resp:  [15-20] 20  SpO2:  [96 %] 96 %  BP: (145-159)/(77-97) 159/77     Weight: 74.4 kg (164 lb)  Body mass index is 24.22 kg/m².     Physical Exam  Vitals and nursing note reviewed.   Constitutional:       General: She is not in acute distress.     Appearance: She is well-developed.   HENT:      Head: Normocephalic and atraumatic.      Mouth/Throat:      Pharynx: No oropharyngeal exudate.   Eyes:      General: No scleral icterus.     Extraocular Movements: Extraocular movements intact.      Conjunctiva/sclera: Conjunctivae normal.   Cardiovascular:      Rate and Rhythm: Normal rate and regular rhythm.      Heart sounds: Normal heart sounds.   Pulmonary:      Effort: Pulmonary effort is normal. No respiratory distress.      Breath sounds: Normal breath sounds. No wheezing.   Abdominal:      General: Bowel sounds are normal. There is no distension.      Palpations: Abdomen is soft.      Tenderness: There is no abdominal tenderness. There is no right CVA tenderness or left CVA tenderness.   Musculoskeletal:         General: No tenderness. Normal range of motion.      Cervical back: Normal range of motion and neck supple.   Lymphadenopathy:      Cervical: No cervical adenopathy.   Skin:     General: Skin is warm and dry.      Capillary Refill: Capillary refill takes less than 2 seconds.       Findings: No rash.   Neurological:      Mental Status: She is alert and oriented to person, place, and time.      Cranial Nerves: No cranial nerve deficit.      Sensory: No sensory deficit.      Coordination: Coordination normal.   Psychiatric:         Behavior: Behavior normal.         Thought Content: Thought content normal.         Judgment: Judgment normal.              Significant Labs: All pertinent labs within the past 24 hours have been reviewed.  CBC:   Recent Labs   Lab 05/07/23 1957   WBC 9.34   HGB 14.9   HCT 44.0        CMP:   Recent Labs   Lab 05/07/23 1957      K 3.5      CO2 23   GLU 99   BUN 6*   CREATININE 0.6   CALCIUM 9.0   PROT 6.7   ALBUMIN 3.9   BILITOT 0.6   ALKPHOS 62   AST 21   ALT 21   ANIONGAP 12       Significant Imaging: I have reviewed all pertinent imaging results/findings within the past 24 hours.    Assessment/Plan:     * New onset a-fib  - likely triggered by UTI, dehydration and hypoMg  - converted to NSR s/p antibiotics and IVFs  - [id number] Score 3, however will hold on anticoagulation as converted to NSR spontaneously  - start PO lopressor 12.5mg BID  - repeat TTE  - treat UTI  - cIVFs overnight  - keep K>4, Mg>2  - referral to cardiology on dischagre    Urinary tract infection without hematuria  - afebrile without leukocytosis  - continue IV rocephin 1g q24hrs  - follow urine cx    Hypomagnesemia  - Mg 1.5 on admit  - 2g IV Mg given in the ED  - trend daily     Hyperlipidemia  - continue ASA, statin     GERD (gastroesophageal reflux disease)  - continue PPI    Anxiety  - continue PRN ativan    VTE Risk Mitigation (From admission, onward)         Ordered     IP VTE LOW RISK PATIENT  Once         05/07/23 2129     Place sequential compression device  Until discontinued         05/07/23 2129                     On 05/07/2023, patient should be placed in hospital observation services under my care in collaboration with Dr. Afshin Mata.      Raquel MASCORRO  CASS[facility]  Department of Hospital Medicine  [facility] - Emergency Dept

## 2023-05-08 NOTE — HPI
"Karina Thomas is a 68 y.o. female with a PMHx of GERD, anxiety, HLD who presents to [facility] for evaluation of UTI symptoms and palpitations. Patient reports dysuria with associated increased fatigue, malaise, generalized weakness, and nausea for the past day. Symptoms felt similar to prior UTIs in the past so she took Azo and an old antibiotic without any improvement. Endorses associated palpitations which she attributes to anxiety. She feels like her body is "achy" and some pain/ soreness to her back but denies any flank pain. Denies fever, chills, chest pain, SOB, LE swelling, vomiting, abdominal pain, flank pain, HA, vision changes or syncope.     ED: mildly tachycardic to 108 and noted to be in Afib on EKG. Converted to NSR s/p 30ml/kg sepsis bolus. No leukocytosis. Mg 1.5. Given IV CTX 1g and 2g IV Mg.  "

## 2023-05-08 NOTE — ED NOTES
Pt assisted to restroom with steady gait noted. Returned to bed without incident. Bed locked & in the lowest position, rails up x2, call button in reach. Tele monitoring remains in place. LR infusing per orders. POC reviewed, pt states understanding.

## 2023-05-09 ENCOUNTER — HOSPITAL ENCOUNTER (OUTPATIENT)
Dept: CARDIOLOGY | Facility: HOSPITAL | Age: 68
Discharge: HOME | End: 2023-05-09
Payer: MEDICARE

## 2023-05-09 DIAGNOSIS — N39.0 URINARY TRACT INFECTION WITHOUT HEMATURIA, SITE UNSPECIFIED: ICD-10-CM

## 2023-05-09 PROCEDURE — 93225 XTRNL ECG REC<48 HRS REC: CPT

## 2023-05-09 PROCEDURE — 93227 XTRNL ECG REC<48 HR R&I: CPT | Mod: ,,, | Performed by: INTERNAL MEDICINE

## 2023-05-09 PROCEDURE — 93227 HOLTER MONITOR - 48 HOUR (CUPID ONLY): ICD-10-PCS | Mod: ,,, | Performed by: INTERNAL MEDICINE

## 2023-05-12 ENCOUNTER — PATIENT MESSAGE (OUTPATIENT)
Dept: PRIMARY CARE CLINIC | Facility: CLINIC | Age: 68
End: 2023-05-12

## 2023-05-12 ENCOUNTER — OFFICE VISIT (OUTPATIENT)
Dept: PRIMARY CARE CLINIC | Facility: CLINIC | Age: 68
End: 2023-05-12
Payer: MEDICARE

## 2023-05-12 ENCOUNTER — HOSPITAL ENCOUNTER (OUTPATIENT)
Dept: RADIOLOGY | Facility: HOSPITAL | Age: 68
Discharge: HOME | End: 2023-05-12
Attending: INTERNAL MEDICINE
Payer: MEDICARE

## 2023-05-12 VITALS
OXYGEN SATURATION: 98 % | WEIGHT: 165.56 LBS | SYSTOLIC BLOOD PRESSURE: 138 MMHG | BODY MASS INDEX: 24.52 KG/M2 | HEIGHT: 69 IN | HEART RATE: 77 BPM | TEMPERATURE: 98 F | DIASTOLIC BLOOD PRESSURE: 78 MMHG

## 2023-05-12 DIAGNOSIS — R05.9 COUGH, UNSPECIFIED TYPE: ICD-10-CM

## 2023-05-12 DIAGNOSIS — Z09 HOSPITAL DISCHARGE FOLLOW-UP: Primary | ICD-10-CM

## 2023-05-12 DIAGNOSIS — I48.91 NEW ONSET ATRIAL FIBRILLATION: ICD-10-CM

## 2023-05-12 DIAGNOSIS — N39.0 URINARY TRACT INFECTION WITHOUT HEMATURIA, SITE UNSPECIFIED: ICD-10-CM

## 2023-05-12 LAB
BACTERIA BLD CULT: NORMAL
BACTERIA BLD CULT: NORMAL
OHS CV EVENT MONITOR DAY: 0
OHS CV HOLTER LENGTH DECIMAL HOURS: 48
OHS CV HOLTER LENGTH HOURS: 48
OHS CV HOLTER LENGTH MINUTES: 0
OHS CV HOLTER SINUS AVERAGE HR: 83
OHS CV HOLTER SINUS MAX HR: 130
OHS CV HOLTER SINUS MIN HR: 59

## 2023-05-12 PROCEDURE — 99213 OFFICE O/P EST LOW 20 MIN: CPT | Mod: S$PBB,,, | Performed by: INTERNAL MEDICINE

## 2023-05-12 PROCEDURE — 99999 PR PBB SHADOW E&M-EST. PATIENT-LVL V: CPT | Mod: PBBFAC,,, | Performed by: INTERNAL MEDICINE

## 2023-05-12 PROCEDURE — 99215 OFFICE O/P EST HI 40 MIN: CPT | Mod: PBBFAC,PN,25 | Performed by: INTERNAL MEDICINE

## 2023-05-12 PROCEDURE — 99213 PR OFFICE/OUTPT VISIT, EST, LEVL III, 20-29 MIN: ICD-10-PCS | Mod: S$PBB,,, | Performed by: INTERNAL MEDICINE

## 2023-05-12 PROCEDURE — 71046 XR CHEST PA AND LATERAL: ICD-10-PCS | Mod: 26,,, | Performed by: RADIOLOGY

## 2023-05-12 PROCEDURE — 71046 X-RAY EXAM CHEST 2 VIEWS: CPT | Mod: 26,,, | Performed by: RADIOLOGY

## 2023-05-12 PROCEDURE — 99999 PR PBB SHADOW E&M-EST. PATIENT-LVL V: ICD-10-PCS | Mod: PBBFAC,,, | Performed by: INTERNAL MEDICINE

## 2023-05-12 PROCEDURE — 71046 X-RAY EXAM CHEST 2 VIEWS: CPT | Mod: TC,PO

## 2023-05-12 NOTE — PROGRESS NOTES
"Transitional Care Note  Subjective:       Patient ID: Karina Thomas is a 68 y.o. female.  Chief Complaint: hospital follow up    Family and/or Caretaker present at visit?  No.  Diagnostic tests reviewed/disposition: No diagnosic tests pending after this hospitalization.  Disease/illness education: yes  Home health/community services discussion/referrals: Patient does not have home health established from hospital visit.  They do not need home health.  If needed, we will set up home health for the patient.   Establishment or re-establishment of referral orders for community resources: No other necessary community resources.   Discussion with other health care providers: No discussion with other health care providers necessary.     HPI  Hosp for obs 5/7 through 5/8 for new onset AFIB and UTI. Was given sepsis bulus of IVF in the ER and converted back to NSR.   Discharge meds: lopressor 1/2 of 25mg BID, macrobid 100mg BID x 5 days, pyridium 200mg TID prn pelvic pain.  Does not have upcoming appt for cardiology f/u.    UCx 5/7/23 no significant growth.   Bcx 5/7/23 x 2 prelim results and neg.    Reports urinary symptoms resolved. Before discharge, was having congestion and productive cough. Some soreness in the B flank area when she takes in a deep breath. Achy everywhere. No fevers.   No SOB/wheezing. Able to walk from car to inside the clinic w/o issues.   Sunday night w' head congestion but that's much improved.     Slight diarrhea that she attributes to antibiotics. Some nausea. Drank some pepto bismol this morning.  No blood in stool/urine. No palpitations/CP.     Review of Systems  Comprehensive review of systems otherwise negative. See history/subjective section for more details.    Objective:      Physical Exam    /78 (BP Location: Left arm, Patient Position: Sitting, BP Method: Large (Manual))   Pulse 77   Temp 97.7 °F (36.5 °C) (Oral)   Ht 5' 9" (1.753 m)   Wt 75.1 kg (165 lb 9.1 oz)   SpO2 98%   " "BMI 24.45 kg/m²     GEN - A+OX4, NAD   HEENT - PERRL, EOMI, OP clear. MMM. TM normal.   Neck - No thyromegaly or cervical LAD. .  CV - RRR, no m/r   Chest - CTAB, no wheezing or rhonchi. Normal work of breathing.   Abd - S/NT/ND/+BS.   Ext - 2+BDP and radial pulses. No C/C/E.  Neuro - 5/5 BUE and BLE strength.  MSK - diffuse "soreness" to palpation in the lower back B, B arms and legs. Normal gait.   Skin - RUE w/ large bruise at previous IV site. Flat maculopapular rash of BUE (seen derm yesterday)      CXR 5/7/23 -   No acute findings.  Mild prominence of the right hilum, similar compared to prior study.  No significant change from prior study.    TTE   The left ventricle is normal in size with concentric remodeling and low normal systolic function.  The estimated ejection fraction is 50-55%.  Normal left ventricular diastolic function.  Normal right ventricular size with normal right ventricular systolic function.  Mild tricuspid regurgitation.  The estimated PA systolic pressure is 30 mmHg.  Normal central venous pressure (3 mmHg).    EKG   FIRST ONE -   Atrial fibrillation   ST and T wave abnormality, consider lateral ischemia   Abnormal ECG   When compared with ECG of 09-MAR-2022 14:31,   Previous ECG has undetermined rhythm, needs review   T wave inversion now evident in Lateral leads   SECOND ONE -   Normal sinus rhythm   ST and T wave abnormality, consider lateral ischemia   Abnormal ECG   When compared with ECG of 07-MAY-2023 19:49,   Sinus rhythm has replaced Atrial fibrillation   Nonspecific T wave abnormality, worse in Inferior leads   QT has shortened     Assessment/Plan       Diagnoses and all orders for this visit:    Hospital discharge follow-up  -     Ambulatory referral/consult to Cardiology; Future; Expected date: 05/19/2023    Urinary tract infection without hematuria, site unspecified - last day of macrobid. Finish course of abx.     New onset atrial fibrillation - 48 hr holter is still " pending.   -     Ambulatory referral/consult to Cardiology; Future; Expected date: 05/19/2023    Cough, unspecified type  -     X-Ray Chest PA And Lateral; Future; Expected date: 05/12/2023  In office rapid COVID test negative.     F/u in 4 mo, sooner if needed.     Pinky Reyes MD  Department of Internal Medicine - [facility]  9:55 AM

## 2023-05-15 ENCOUNTER — TELEPHONE (OUTPATIENT)
Dept: PRIMARY CARE CLINIC | Facility: CLINIC | Age: 68
End: 2023-05-15
Payer: MEDICARE

## 2023-05-15 ENCOUNTER — OFFICE VISIT (OUTPATIENT)
Dept: CARDIOLOGY | Facility: CLINIC | Age: 68
End: 2023-05-15
Payer: MEDICARE

## 2023-05-15 VITALS
HEART RATE: 66 BPM | DIASTOLIC BLOOD PRESSURE: 83 MMHG | SYSTOLIC BLOOD PRESSURE: 155 MMHG | WEIGHT: 165 LBS | RESPIRATION RATE: 20 BRPM | HEIGHT: 69 IN | BODY MASS INDEX: 24.44 KG/M2

## 2023-05-15 DIAGNOSIS — I70.0 AORTIC ATHEROSCLEROSIS: ICD-10-CM

## 2023-05-15 DIAGNOSIS — I48.91 NEW ONSET ATRIAL FIBRILLATION: ICD-10-CM

## 2023-05-15 DIAGNOSIS — E78.5 HYPERLIPIDEMIA, UNSPECIFIED HYPERLIPIDEMIA TYPE: ICD-10-CM

## 2023-05-15 DIAGNOSIS — Z09 HOSPITAL DISCHARGE FOLLOW-UP: ICD-10-CM

## 2023-05-15 DIAGNOSIS — I10 HYPERTENSION, UNSPECIFIED TYPE: ICD-10-CM

## 2023-05-15 DIAGNOSIS — I48.0 PAROXYSMAL ATRIAL FIBRILLATION: Primary | ICD-10-CM

## 2023-05-15 PROCEDURE — 99214 OFFICE O/P EST MOD 30 MIN: CPT | Mod: PBBFAC | Performed by: INTERNAL MEDICINE

## 2023-05-15 PROCEDURE — 99204 OFFICE O/P NEW MOD 45 MIN: CPT | Mod: S$PBB,,, | Performed by: INTERNAL MEDICINE

## 2023-05-15 PROCEDURE — 99999 PR PBB SHADOW E&M-EST. PATIENT-LVL IV: CPT | Mod: PBBFAC,,, | Performed by: INTERNAL MEDICINE

## 2023-05-15 PROCEDURE — 99999 PR PBB SHADOW E&M-EST. PATIENT-LVL IV: ICD-10-PCS | Mod: PBBFAC,,, | Performed by: INTERNAL MEDICINE

## 2023-05-15 PROCEDURE — 99204 PR OFFICE/OUTPT VISIT, NEW, LEVL IV, 45-59 MIN: ICD-10-PCS | Mod: S$PBB,,, | Performed by: INTERNAL MEDICINE

## 2023-05-15 RX ORDER — METOPROLOL TARTRATE 25 MG/1
25 TABLET, FILM COATED ORAL 2 TIMES DAILY
Qty: 180 TABLET | Refills: 3 | Status: SHIPPED | OUTPATIENT
Start: 2023-05-15 | End: 2023-06-21 | Stop reason: SDUPTHER

## 2023-05-15 RX ORDER — TRIAMCINOLONE ACETONIDE 1 MG/G
CREAM TOPICAL
COMMUNITY
Start: 2023-05-11

## 2023-05-15 RX ORDER — ROSUVASTATIN CALCIUM 20 MG/1
20 TABLET, COATED ORAL DAILY
Qty: 90 TABLET | Refills: 3 | Status: SHIPPED | OUTPATIENT
Start: 2023-05-15 | End: 2024-02-21 | Stop reason: SDUPTHER

## 2023-05-15 NOTE — PROGRESS NOTES
HISTORY:    68-year-old female with a history of paroxysmal atrial fibrillation, hyperlipidemia, aortic atherosclerosis, +tob, GERD, and anxiety presenting for initial evaluation by me.    Patient had a recent admission for UTI status post treatment.  Noted to be in atrial fibrillation on presentation to the ED.  Converted to normal sinus rhythm spontaneously. Patient has just finished her abx with improved symptoms.    The patient denies any symptoms of chest pain, shortness of breath, or dyspnea on exertion.    Activity levels at baseline are moderate. Pt stays active during the days without dedicated exercise. Completes ADLs and cares for her own house. Helps with care of grandchildren.     The patient denies any previous history of myocardial infarction, coronary artery disease, peripheral arterial disease, stroke, congestive heart failure, or cardiomyopathy.    She currently tolerates metoprolol 12.5 x 2 and pravastatin 80 x 1.    PHYSICAL EXAM:    Vitals:    05/15/23 0926   BP: (!) 155/83   Pulse: 66   Resp: 20       NAD, A+Ox3.  No jvd, no bruit.  RRR nml s1,s2. No murmurs.  CTA B no wheezes or crackles.  No edema.    LABS/STUDIES (imaging reviewed during clinic visit):    May 2023 hemoglobin 14.0.  Creatinine 0.6 with a BUN of 5.  Albumin 3.9.   and HDL 93.  Triglycerides 74.  Although renin ratio 26.4.  Normal epi/norepi.  Normal metanephrine/normetanephrine.  A1c normal.    ECG May 2023 one ecg with sinus rhythm with no Q-waves and non-specific Sts after an ecg with afib.  Holter May 2023 demonstrates sinus rhythm with average heart rate 83 beats per minute.  PACs and PVCs noted with a less than 1% burden.  No evidence of atrial fibrillation.  TTE May 2023 normal LV size with concentric remodeling and an EF of 50 55%.  Normal diastology.  CVP 3.    BASSAM 2020 to 2 minutes and 44 seconds on a medium ramp protocol.  No evidence of ischemia with normal TTE at baseline.    Chest x-ray May 2023 clear  with a normal heart size.  Aortic arch calcification noted.    Arterial duplex 2023 no evidence of significant stenosis bilaterally.  CT abdomen pelvis 2023 aortic calcification.      ASSESSMENT & PLAN:    1. Paroxysmal atrial fibrillation    2. Hospital discharge follow-up    3. New onset atrial fibrillation    4. Hyperlipidemia, unspecified hyperlipidemia type    5. Aortic atherosclerosis    6. Hypertension, unspecified type        Orders Placed This Encounter    apixaban (ELIQUIS) 5 mg Tab    metoprolol tartrate (LOPRESSOR) 25 MG tablet    rosuvastatin (CRESTOR) 20 MG tablet        Patient with paroxysmal atrial fibrillation that is newly recognized. No symptoms. Recently started on metoprolol. Chadsvasc 4. Will start apixiban 5x2. Okay to stop asa.    Bps elevated on graphic trends. Uptitrating metoprolol to 25x2.      on pravastatin. Will change to rosuvastatin 20x1.     Patient understands the benefits in quitting tob.    Follow up in about 3 months (around 8/15/2023).      Rossana García MD

## 2023-05-15 NOTE — TELEPHONE ENCOUNTER
Please call and notify pt:  Holter shows very frequent early beats (premature ventricular and premature atrial beats). These are generally benign (not worrisome but can potentially cause palpitations). Follow up w/ EP as discussed w/ her afib that occurred while she was in the hospital. Please also see how she's feeling from the congestion/body aches standpoint. Thanks!

## 2023-05-16 ENCOUNTER — PATIENT MESSAGE (OUTPATIENT)
Dept: CARDIOLOGY | Facility: CLINIC | Age: 68
End: 2023-05-16
Payer: MEDICARE

## 2023-05-16 NOTE — TELEPHONE ENCOUNTER
Pt states she is feeling much better as far as congestion & body aches.    Wanted to make sure you are good w/ cards medication changes / updates.  Thanks!

## 2023-05-18 ENCOUNTER — PATIENT MESSAGE (OUTPATIENT)
Dept: PRIMARY CARE CLINIC | Facility: CLINIC | Age: 68
End: 2023-05-18
Payer: MEDICARE

## 2023-05-22 ENCOUNTER — TELEPHONE (OUTPATIENT)
Dept: FAMILY MEDICINE | Facility: CLINIC | Age: 68
End: 2023-05-22
Payer: MEDICARE

## 2023-05-26 ENCOUNTER — PATIENT MESSAGE (OUTPATIENT)
Dept: PRIMARY CARE CLINIC | Facility: CLINIC | Age: 68
End: 2023-05-26
Payer: MEDICARE

## 2023-05-31 ENCOUNTER — OFFICE VISIT (OUTPATIENT)
Dept: CARDIOLOGY | Facility: CLINIC | Age: 68
End: 2023-05-31
Payer: MEDICARE

## 2023-05-31 ENCOUNTER — PATIENT MESSAGE (OUTPATIENT)
Dept: CARDIOLOGY | Facility: CLINIC | Age: 68
End: 2023-05-31

## 2023-05-31 VITALS
SYSTOLIC BLOOD PRESSURE: 145 MMHG | WEIGHT: 165.38 LBS | DIASTOLIC BLOOD PRESSURE: 87 MMHG | BODY MASS INDEX: 24.42 KG/M2 | HEART RATE: 78 BPM

## 2023-05-31 DIAGNOSIS — I10 HYPERTENSION, UNSPECIFIED TYPE: ICD-10-CM

## 2023-05-31 DIAGNOSIS — I48.0 PAROXYSMAL ATRIAL FIBRILLATION: ICD-10-CM

## 2023-05-31 DIAGNOSIS — I70.0 AORTIC ATHEROSCLEROSIS: Primary | ICD-10-CM

## 2023-05-31 DIAGNOSIS — E78.5 HYPERLIPIDEMIA, UNSPECIFIED HYPERLIPIDEMIA TYPE: ICD-10-CM

## 2023-05-31 DIAGNOSIS — Z72.0 TOBACCO USE: ICD-10-CM

## 2023-05-31 PROCEDURE — 99999 PR PBB SHADOW E&M-EST. PATIENT-LVL III: CPT | Mod: PBBFAC,,, | Performed by: INTERNAL MEDICINE

## 2023-05-31 PROCEDURE — 99213 OFFICE O/P EST LOW 20 MIN: CPT | Mod: PBBFAC | Performed by: INTERNAL MEDICINE

## 2023-05-31 PROCEDURE — 99999 PR PBB SHADOW E&M-EST. PATIENT-LVL III: ICD-10-PCS | Mod: PBBFAC,,, | Performed by: INTERNAL MEDICINE

## 2023-05-31 PROCEDURE — 99214 PR OFFICE/OUTPT VISIT, EST, LEVL IV, 30-39 MIN: ICD-10-PCS | Mod: S$PBB,,, | Performed by: INTERNAL MEDICINE

## 2023-05-31 PROCEDURE — 99214 OFFICE O/P EST MOD 30 MIN: CPT | Mod: S$PBB,,, | Performed by: INTERNAL MEDICINE

## 2023-06-08 ENCOUNTER — PATIENT MESSAGE (OUTPATIENT)
Dept: PRIMARY CARE CLINIC | Facility: CLINIC | Age: 68
End: 2023-06-08
Payer: MEDICARE

## 2023-06-08 NOTE — TELEPHONE ENCOUNTER
Dr Reyes, I asked back in our ObGyn dept, and they said all providers were taking on new pts   [place] that the following would have sooner appts / time to spend w/ our pts   Dr Maddy Infante (????)   Margaret Rudd is going to be further out.

## 2023-06-12 ENCOUNTER — PATIENT MESSAGE (OUTPATIENT)
Dept: CARDIOLOGY | Facility: CLINIC | Age: 68
End: 2023-06-12
Payer: MEDICARE

## 2023-06-14 DIAGNOSIS — F41.9 ANXIETY: ICD-10-CM

## 2023-06-14 RX ORDER — LORAZEPAM 0.5 MG/1
TABLET ORAL
Qty: 30 TABLET | Refills: 0 | Status: SHIPPED | OUTPATIENT
Start: 2023-06-14 | End: 2023-07-23 | Stop reason: SDUPTHER

## 2023-06-21 ENCOUNTER — OFFICE VISIT (OUTPATIENT)
Dept: CARDIOLOGY | Facility: CLINIC | Age: 68
End: 2023-06-21
Payer: MEDICARE

## 2023-06-21 VITALS
BODY MASS INDEX: 24.2 KG/M2 | DIASTOLIC BLOOD PRESSURE: 83 MMHG | SYSTOLIC BLOOD PRESSURE: 148 MMHG | WEIGHT: 163.38 LBS | HEART RATE: 71 BPM | HEIGHT: 69 IN

## 2023-06-21 DIAGNOSIS — R07.9 CHEST PAIN, UNSPECIFIED TYPE: Primary | ICD-10-CM

## 2023-06-21 DIAGNOSIS — I10 HYPERTENSION, UNSPECIFIED TYPE: ICD-10-CM

## 2023-06-21 DIAGNOSIS — E78.5 HYPERLIPIDEMIA, UNSPECIFIED HYPERLIPIDEMIA TYPE: ICD-10-CM

## 2023-06-21 DIAGNOSIS — I70.0 AORTIC ATHEROSCLEROSIS: ICD-10-CM

## 2023-06-21 DIAGNOSIS — I48.0 PAROXYSMAL ATRIAL FIBRILLATION: ICD-10-CM

## 2023-06-21 PROCEDURE — 93010 ELECTROCARDIOGRAM REPORT: CPT | Mod: S$PBB,,, | Performed by: INTERNAL MEDICINE

## 2023-06-21 PROCEDURE — 99214 OFFICE O/P EST MOD 30 MIN: CPT | Mod: S$PBB,,, | Performed by: INTERNAL MEDICINE

## 2023-06-21 PROCEDURE — 99214 OFFICE O/P EST MOD 30 MIN: CPT | Mod: PBBFAC | Performed by: INTERNAL MEDICINE

## 2023-06-21 PROCEDURE — 99999 PR PBB SHADOW E&M-EST. PATIENT-LVL IV: ICD-10-PCS | Mod: PBBFAC,,, | Performed by: INTERNAL MEDICINE

## 2023-06-21 PROCEDURE — 93010 EKG 12-LEAD: ICD-10-PCS | Mod: S$PBB,,, | Performed by: INTERNAL MEDICINE

## 2023-06-21 PROCEDURE — 99214 PR OFFICE/OUTPT VISIT, EST, LEVL IV, 30-39 MIN: ICD-10-PCS | Mod: S$PBB,,, | Performed by: INTERNAL MEDICINE

## 2023-06-21 PROCEDURE — 93005 ELECTROCARDIOGRAM TRACING: CPT | Mod: PBBFAC | Performed by: INTERNAL MEDICINE

## 2023-06-21 PROCEDURE — 99999 PR PBB SHADOW E&M-EST. PATIENT-LVL IV: CPT | Mod: PBBFAC,,, | Performed by: INTERNAL MEDICINE

## 2023-06-21 RX ORDER — METOPROLOL TARTRATE 50 MG/1
50 TABLET ORAL 2 TIMES DAILY
Qty: 180 TABLET | Refills: 3 | Status: SHIPPED | OUTPATIENT
Start: 2023-06-21 | End: 2024-02-21 | Stop reason: SDUPTHER

## 2023-06-21 NOTE — PROGRESS NOTES
HISTORY:    68-year-old female with a history of paroxysmal atrial fibrillation, hyperlipidemia, aortic atherosclerosis, +tob, GERD, and anxiety presenting for follow-up.    Patient had an admission for UTI status post treatment May 2023.  Noted to be in atrial fibrillation on presentation to the ED. Only clinical event. Converted to normal sinus rhythm spontaneously.     Occasional, non-limiting palpitations lasting less than a minute on a few occasions since.    Today the patient reports occasional chest tightness. Occurring 1-2x/week lasting a minute at a time. Not neceesarily exertional, but patient feels like she is dragging. No SOB.    Activity levels at baseline are moderate. Pt stays active during the days without dedicated exercise. Completes ADLs and cares for her own house. Helps with care of grandchildren.     The patient denies any previous history of myocardial infarction, coronary artery disease, peripheral arterial disease, stroke, congestive heart failure, or cardiomyopathy.    She currently tolerates metoprolol 25 x 2 and apixiban 5x2.  Rosuvastatin 20x1 held at last visit for non-specific complaints with no change in body aches. Was previously tolerating pravastatin.    PHYSICAL EXAM:    Vitals:    06/21/23 0904   BP: (!) 148/83   Pulse: 71         NAD, A+Ox3.  No jvd, no bruit.  RRR nml s1,s2. No murmurs.  CTA B no wheezes or crackles.  No edema.    LABS/STUDIES (imaging reviewed during clinic visit):    May 2023 hemoglobin 14.0.  Creatinine 0.6 with a BUN of 5.  Albumin 3.9.   and HDL 93.  Triglycerides 74.  Although renin ratio 26.4.  Normal epi/norepi.  Normal metanephrine/normetanephrine.  A1c normal.    ECG today SR no Qs, inflat ST abnormality. May 2023 one ecg with sinus rhythm with no Q-waves and non-specific Sts after an ecg with afib.  Holter May 2023 demonstrates sinus rhythm with average heart rate 83 beats per minute.  PACs and PVCs noted with a less than 1% burden.  No  evidence of atrial fibrillation.  TTE May 2023 normal LV size with concentric remodeling and an EF of 50 55%.  Normal diastology.  CVP 3.    BASSAM 2022 to 2 minutes and 44 seconds on a medium ramp protocol.  No evidence of ischemia with normal TTE at baseline.    Chest x-ray May 2023 clear with a normal heart size.  Aortic arch calcification noted.    Arterial duplex 2023 no evidence of significant stenosis bilaterally.  CT abdomen pelvis 2023 aortic calcification.      ASSESSMENT & PLAN:    1. Chest pain, unspecified type    2. Paroxysmal atrial fibrillation    3. Hypertension, unspecified type    4. Hyperlipidemia, unspecified hyperlipidemia type    5. Aortic atherosclerosis            Orders Placed This Encounter    Nuclear Stress - Cardiology Interpreted    metoprolol tartrate (LOPRESSOR) 50 MG tablet        Patient complaining of chest discomfort with some ecg abnormality compared to '22. Will proceed with NST. Could not exercise on stress '20 2/2 knee injury. Will increase metoprolol to 50x2.     Patient with paroxysmal atrial fibrillation that is newly recognized. No symptoms. Recently started on metoprolol. Chadsvasc 4. On apixiban 5x2.     Bps elevated on graphic trends. Have already uptitrated metoprolol to 50x2. Start home BP log.    Restart rosuvastatin at 10 QOD. Will uptitrate if tolerating.    Patient understands the benefits in quitting tob.    Follow up in about 3 months (around 9/21/2023).      Rossana García MD

## 2023-06-29 ENCOUNTER — TELEPHONE (OUTPATIENT)
Dept: CARDIOLOGY | Facility: HOSPITAL | Age: 68
End: 2023-06-29
Payer: MEDICARE

## 2023-07-03 ENCOUNTER — HOSPITAL ENCOUNTER (OUTPATIENT)
Dept: CARDIOLOGY | Facility: HOSPITAL | Age: 68
Discharge: HOME | End: 2023-07-03
Attending: INTERNAL MEDICINE
Payer: MEDICARE

## 2023-07-03 VITALS
HEIGHT: 69 IN | WEIGHT: 163 LBS | SYSTOLIC BLOOD PRESSURE: 198 MMHG | DIASTOLIC BLOOD PRESSURE: 102 MMHG | HEART RATE: 62 BPM | BODY MASS INDEX: 24.14 KG/M2

## 2023-07-03 DIAGNOSIS — R07.9 CHEST PAIN, UNSPECIFIED TYPE: ICD-10-CM

## 2023-07-03 LAB
CV PHARM DOSE: 0.4 MG
CV STRESS BASE HR: 62 BPM
DIASTOLIC BLOOD PRESSURE: 102 MMHG
EJECTION FRACTION- HIGH: 59 %
END DIASTOLIC INDEX-HIGH: 155 ML/M2
END DIASTOLIC INDEX-LOW: 91 ML/M2
END SYSTOLIC INDEX-HIGH: 78 ML/M2
END SYSTOLIC INDEX-LOW: 40 ML/M2
NUC REST DIASTOLIC VOLUME INDEX: 65
NUC REST EJECTION FRACTION: 69
NUC REST SYSTOLIC VOLUME INDEX: 20
NUC STRESS DIASTOLIC VOLUME INDEX: 91
NUC STRESS EJECTION FRACTION: 59 %
NUC STRESS SYSTOLIC VOLUME INDEX: 38
OHS CV CPX 85 PERCENT MAX PREDICTED HEART RATE MALE: 124
OHS CV CPX MAX PREDICTED HEART RATE: 146
OHS CV CPX PATIENT IS FEMALE: 1
OHS CV CPX PATIENT IS MALE: 0
OHS CV CPX PEAK DIASTOLIC BLOOD PRESSURE: 107 MMHG
OHS CV CPX PEAK HEAR RATE: 66 BPM
OHS CV CPX PEAK RATE PRESSURE PRODUCT: NORMAL
OHS CV CPX PEAK SYSTOLIC BLOOD PRESSURE: 180 MMHG
OHS CV CPX PERCENT MAX PREDICTED HEART RATE ACHIEVED: 45
OHS CV CPX RATE PRESSURE PRODUCT PRESENTING: NORMAL
RETIRED EF AND QEF - SEE NOTES: 47 %
SYSTOLIC BLOOD PRESSURE: 198 MMHG

## 2023-07-03 PROCEDURE — 78452 NUCLEAR STRESS - CARDIOLOGY INTERPRETED (CUPID ONLY): ICD-10-PCS | Mod: 26,,, | Performed by: INTERNAL MEDICINE

## 2023-07-03 PROCEDURE — 78452 HT MUSCLE IMAGE SPECT MULT: CPT | Mod: 26,,, | Performed by: INTERNAL MEDICINE

## 2023-07-03 PROCEDURE — 93016 CV STRESS TEST SUPVJ ONLY: CPT | Mod: ,,, | Performed by: INTERNAL MEDICINE

## 2023-07-03 PROCEDURE — 93016 NUCLEAR STRESS - CARDIOLOGY INTERPRETED (CUPID ONLY): ICD-10-PCS | Mod: ,,, | Performed by: INTERNAL MEDICINE

## 2023-07-03 PROCEDURE — 93018 CV STRESS TEST I&R ONLY: CPT | Mod: ,,, | Performed by: INTERNAL MEDICINE

## 2023-07-03 PROCEDURE — 63600175 PHARM REV CODE 636 W HCPCS: Performed by: INTERNAL MEDICINE

## 2023-07-03 PROCEDURE — A9502 TC99M TETROFOSMIN: HCPCS

## 2023-07-03 PROCEDURE — 93018 NUCLEAR STRESS - CARDIOLOGY INTERPRETED (CUPID ONLY): ICD-10-PCS | Mod: ,,, | Performed by: INTERNAL MEDICINE

## 2023-07-03 PROCEDURE — 78452 HT MUSCLE IMAGE SPECT MULT: CPT

## 2023-07-03 RX ORDER — AMINOPHYLLINE 25 MG/ML
75 INJECTION, SOLUTION INTRAVENOUS ONCE
Status: COMPLETED | OUTPATIENT
Start: 2023-07-03 | End: 2023-07-03

## 2023-07-03 RX ORDER — REGADENOSON 0.08 MG/ML
0.4 INJECTION, SOLUTION INTRAVENOUS
Status: COMPLETED | OUTPATIENT
Start: 2023-07-03 | End: 2023-07-03

## 2023-07-03 RX ADMIN — AMINOPHYLLINE 75 MG: 25 INJECTION, SOLUTION INTRAVENOUS at 08:07

## 2023-07-03 RX ADMIN — REGADENOSON 0.4 MG: 0.08 INJECTION, SOLUTION INTRAVENOUS at 08:07

## 2023-07-05 ENCOUNTER — OFFICE VISIT (OUTPATIENT)
Dept: OBSTETRICS AND GYNECOLOGY | Facility: CLINIC | Age: 68
End: 2023-07-05
Payer: MEDICARE

## 2023-07-05 ENCOUNTER — PATIENT MESSAGE (OUTPATIENT)
Dept: CARDIOLOGY | Facility: CLINIC | Age: 68
End: 2023-07-05
Payer: MEDICARE

## 2023-07-05 VITALS
HEIGHT: 69 IN | BODY MASS INDEX: 24.09 KG/M2 | SYSTOLIC BLOOD PRESSURE: 154 MMHG | DIASTOLIC BLOOD PRESSURE: 95 MMHG | WEIGHT: 162.69 LBS

## 2023-07-05 DIAGNOSIS — Z12.31 BREAST CANCER SCREENING BY MAMMOGRAM: ICD-10-CM

## 2023-07-05 DIAGNOSIS — Z87.42 HISTORY OF OVARIAN CYST: ICD-10-CM

## 2023-07-05 DIAGNOSIS — Z01.419 ENCOUNTER FOR GYNECOLOGICAL EXAMINATION: Primary | ICD-10-CM

## 2023-07-05 PROCEDURE — G0101 CA SCREEN;PELVIC/BREAST EXAM: HCPCS | Mod: S$PBB,GZ,, | Performed by: STUDENT IN AN ORGANIZED HEALTH CARE EDUCATION/TRAINING PROGRAM

## 2023-07-05 PROCEDURE — G0101 PR CA SCREEN;PELVIC/BREAST EXAM: ICD-10-PCS | Mod: S$PBB,GZ,, | Performed by: STUDENT IN AN ORGANIZED HEALTH CARE EDUCATION/TRAINING PROGRAM

## 2023-07-05 PROCEDURE — 99999 PR PBB SHADOW E&M-EST. PATIENT-LVL IV: CPT | Mod: PBBFAC,,, | Performed by: STUDENT IN AN ORGANIZED HEALTH CARE EDUCATION/TRAINING PROGRAM

## 2023-07-05 PROCEDURE — 99214 OFFICE O/P EST MOD 30 MIN: CPT | Mod: PBBFAC | Performed by: STUDENT IN AN ORGANIZED HEALTH CARE EDUCATION/TRAINING PROGRAM

## 2023-07-05 PROCEDURE — 99999 PR PBB SHADOW E&M-EST. PATIENT-LVL IV: ICD-10-PCS | Mod: PBBFAC,,, | Performed by: STUDENT IN AN ORGANIZED HEALTH CARE EDUCATION/TRAINING PROGRAM

## 2023-07-05 RX ORDER — PRAVASTATIN SODIUM 80 MG/1
TABLET ORAL
COMMUNITY
End: 2023-07-05

## 2023-07-05 RX ORDER — NITROFURANTOIN 25; 75 MG/1; MG/1
CAPSULE ORAL
COMMUNITY
End: 2023-07-05

## 2023-07-05 RX ORDER — TACROLIMUS 1 MG/G
OINTMENT TOPICAL
COMMUNITY

## 2023-07-05 RX ORDER — LEVOFLOXACIN 500 MG/1
TABLET, FILM COATED ORAL
COMMUNITY
End: 2023-07-05

## 2023-07-05 RX ORDER — CIPROFLOXACIN 250 MG/1
TABLET, FILM COATED ORAL
COMMUNITY
End: 2023-07-05

## 2023-07-05 RX ORDER — OMEGA-3-ACID ETHYL ESTERS 1 G/1
CAPSULE, LIQUID FILLED ORAL
COMMUNITY

## 2023-07-05 RX ORDER — CEFDINIR 300 MG/1
CAPSULE ORAL
COMMUNITY
End: 2023-07-05

## 2023-07-05 RX ORDER — HYDROCODONE POLISTIREX AND CHLORPHENIRAMINE POLISTIREX 10; 8 MG/5ML; MG/5ML
SUSPENSION, EXTENDED RELEASE ORAL
COMMUNITY
End: 2023-07-05

## 2023-07-05 RX ORDER — DEXAMETHASONE 1 MG/1
TABLET ORAL
COMMUNITY
Start: 2023-04-13 | End: 2023-07-05

## 2023-07-05 RX ORDER — SERTRALINE HYDROCHLORIDE 50 MG/1
TABLET, FILM COATED ORAL
COMMUNITY
End: 2023-07-05

## 2023-07-05 RX ORDER — AMOXICILLIN AND CLAVULANATE POTASSIUM 875; 125 MG/1; MG/1
TABLET, FILM COATED ORAL
COMMUNITY
End: 2023-07-05

## 2023-07-05 RX ORDER — AZITHROMYCIN 250 MG/1
TABLET, FILM COATED ORAL
COMMUNITY
End: 2023-07-05

## 2023-07-05 NOTE — PROGRESS NOTES
Chief Complaint: Well Woman Exam     HPI:      Karina Thomas is a 68 y.o.  who presents today for well woman exam and establish GYN care. Postmenopausal. Vaginal hysterectomy in  for h/o abnormal paps, have all been normal since surgery.  +Dyspareunia w/insertion and dryness symptoms. Started on vaginal estrace cream about 6 weeks ago by PCP. Using OTC lubricant, as well. Not yet noticing a difference. Denies other GYN complaints.  Specifically, patient denies vaginal bleeding, discharge, pelvic pain, urinary problems, or changes in appetite. Taking daily D-mannose for h/o recurrent UTIs. Ms. Thomas is currently sexually active with a single male partner, .     Previous Pap: no longer indicated  Previous Mammogram: BiRads: 1 T-C Score: 5.95% (10/4/22)   Most Recent Dexa: WNL (), Repeat in   Most Recent Colonoscopy: WNL per patient (obtains every 3 yrs and reports up to date)    Past Medical History:   Diagnosis Date    Anxiety     GERD (gastroesophageal reflux disease)     Hyperlipidemia      Past Surgical History:   Procedure Laterality Date    APPENDECTOMY      HYSTERECTOMY      TVH ov in situ - due to abnormal Pap smear     Social History     Socioeconomic History    Marital status:    Occupational History    Occupation: retired    Tobacco Use    Smoking status: Every Day     Packs/day: 1.00     Years: 26.00     Pack years: 26.00     Types: Cigarettes    Smokeless tobacco: Never   Substance and Sexual Activity    Alcohol use: Yes     Alcohol/week: 7.0 standard drinks     Types: 7 Glasses of wine per week    Drug use: No    Sexual activity: Yes     Partners: Male     Birth control/protection: None     Comment:     Social History Narrative    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     Walks about 2 days a week. Has a FITBIT and goes 10k steps every day.      Social Determinants of Health     Financial Resource Strain: Low Risk     Difficulty of Paying Living Expenses: Not  "hard at all   Food Insecurity: No Food Insecurity    Worried About Running Out of Food in the Last Year: Never true    Ran Out of Food in the Last Year: Never true   Transportation Needs: No Transportation Needs    Lack of Transportation (Medical): No    Lack of Transportation (Non-Medical): No   Physical Activity: Insufficiently Active    Days of Exercise per Week: 2 days    Minutes of Exercise per Session: 20 min   Stress: Stress Concern Present    Feeling of Stress : To some extent   Social Connections: Unknown    Frequency of Communication with Friends and Family: More than three times a week    Frequency of Social Gatherings with Friends and Family: Twice a week    Active Member of Clubs or Organizations: No    Attends Club or Organization Meetings: Patient refused    Marital Status:    Housing Stability: Low Risk     Unable to Pay for Housing in the Last Year: No    Number of Places Lived in the Last Year: 1    Unstable Housing in the Last Year: No     Family History   Problem Relation Age of Onset    Cancer Father         unknown CA    Alzheimer's disease Mother     Heart disease Brother         cad s/p stent 64    No Known Problems Daughter     No Known Problems Son     Breast cancer Neg Hx     Colon cancer Neg Hx     Ovarian cancer Neg Hx      Review of patient's allergies indicates:  No Known Allergies    OB History          4    Para   4    Term   2            AB        Living   2         SAB        IAB        Ectopic        Multiple        Live Births                   Physical Exam:      PHYSICAL EXAM:  BP (!) 154/95   Ht 5' 9" (1.753 m)   Wt 73.8 kg (162 lb 11.2 oz)   BMI 24.03 kg/m²   Body mass index is 24.03 kg/m².     APPEARANCE: Well nourished, well developed, in no acute distress.  PSYCH: Appropriate mood and affect.  SKIN: No acne or hirsutism  NECK: Neck symmetric without masses  NODES: No inguinal, axillary, or supraclavicular lymph node enlargement  ABDOMEN: Soft.  No " tenderness or masses.    CARDIOVASCULAR: No edema of peripheral extremities  BREASTS: Symmetrical, no visible skin lesions. No palpable masses. No nipple discharge bilaterally.  PELVIC: Normal external genitalia without lesions.  Normal hair distribution.  Adequate perineal body, normal urethral meatus.  Vagina moist and smooth. Without lesions. Without discharge.  Normal appearing vaginal cuff.  No significant cystocele or rectocele.  Bimanual exam shows no midline or adnexal masses.      Assessment/Plan:     Encounter for gynecological examination    Breast cancer screening by mammogram  -     Mammo Digital Screening Bilat w/ Bo; Future; Expected date: 10/05/2023    History of ovarian cyst  -     US Pelvis Comp with Transvag NON-OB (xpd; Future; Expected date: 07/05/2023      - pelvic exam normal  - discussed using vaginal estrace nightly x 2 weeks then decreasing to twice weekly, may take up to 12 wks for greatest efficacy, she voiced understanding   - also recommended OTC Replens  - MMG due in October  - colonoscopy UTD per pt  - will repeat pelvic US in August to f/u 5 cm right simple ovarian cyst noted in February, asymptomatic     Counseling:     Patient was counseled today on current ASCCP pap guidelines, the recommendation for yearly physical exams, safe driving habits, breast self awareness and annual mammograms. She is to see her PCP for other health maintenance.     Use of the [employer] Patient Portal discussed and encouraged during today's visit.     Nancy Ambrose MD  Obstetrics and Gynecology  [facility]

## 2023-07-07 ENCOUNTER — PATIENT MESSAGE (OUTPATIENT)
Dept: OBSTETRICS AND GYNECOLOGY | Facility: CLINIC | Age: 68
End: 2023-07-07
Payer: MEDICARE

## 2023-07-07 ENCOUNTER — TELEPHONE (OUTPATIENT)
Dept: OBSTETRICS AND GYNECOLOGY | Facility: CLINIC | Age: 68
End: 2023-07-07
Payer: MEDICARE

## 2023-07-07 DIAGNOSIS — Z78.0 MENOPAUSE: Primary | ICD-10-CM

## 2023-07-07 RX ORDER — ESTRADIOL 0.1 MG/G
2 CREAM VAGINAL NIGHTLY
Qty: 42.5 G | Refills: 3 | Status: SHIPPED | OUTPATIENT
Start: 2023-07-07 | End: 2023-11-08

## 2023-07-23 DIAGNOSIS — F41.9 ANXIETY: ICD-10-CM

## 2023-07-24 RX ORDER — LORAZEPAM 0.5 MG/1
0.5 TABLET ORAL DAILY PRN
Qty: 30 TABLET | Refills: 0 | Status: SHIPPED | OUTPATIENT
Start: 2023-07-24 | End: 2023-09-15 | Stop reason: SDUPTHER

## 2023-08-07 PROBLEM — N39.0 URINARY TRACT INFECTION WITHOUT HEMATURIA: Status: RESOLVED | Noted: 2023-05-07 | Resolved: 2023-08-07

## 2023-08-21 ENCOUNTER — OFFICE VISIT (OUTPATIENT)
Dept: CARDIOLOGY | Facility: CLINIC | Age: 68
End: 2023-08-21
Payer: MEDICARE

## 2023-08-21 ENCOUNTER — PATIENT MESSAGE (OUTPATIENT)
Dept: CARDIOLOGY | Facility: CLINIC | Age: 68
End: 2023-08-21

## 2023-08-21 VITALS
WEIGHT: 160.06 LBS | HEIGHT: 69 IN | HEART RATE: 55 BPM | BODY MASS INDEX: 23.71 KG/M2 | SYSTOLIC BLOOD PRESSURE: 151 MMHG | DIASTOLIC BLOOD PRESSURE: 89 MMHG

## 2023-08-21 DIAGNOSIS — I70.0 AORTIC ATHEROSCLEROSIS: ICD-10-CM

## 2023-08-21 DIAGNOSIS — I48.0 PAROXYSMAL ATRIAL FIBRILLATION: Primary | ICD-10-CM

## 2023-08-21 DIAGNOSIS — Z72.0 TOBACCO USE: ICD-10-CM

## 2023-08-21 DIAGNOSIS — E78.5 HYPERLIPIDEMIA, UNSPECIFIED HYPERLIPIDEMIA TYPE: ICD-10-CM

## 2023-08-21 DIAGNOSIS — I10 HYPERTENSION, UNSPECIFIED TYPE: ICD-10-CM

## 2023-08-21 PROCEDURE — 99214 PR OFFICE/OUTPT VISIT, EST, LEVL IV, 30-39 MIN: ICD-10-PCS | Mod: S$PBB,,, | Performed by: INTERNAL MEDICINE

## 2023-08-21 PROCEDURE — 99999 PR PBB SHADOW E&M-EST. PATIENT-LVL IV: ICD-10-PCS | Mod: PBBFAC,,, | Performed by: INTERNAL MEDICINE

## 2023-08-21 PROCEDURE — 99214 OFFICE O/P EST MOD 30 MIN: CPT | Mod: S$PBB,,, | Performed by: INTERNAL MEDICINE

## 2023-08-21 PROCEDURE — 99999 PR PBB SHADOW E&M-EST. PATIENT-LVL IV: CPT | Mod: PBBFAC,,, | Performed by: INTERNAL MEDICINE

## 2023-08-21 PROCEDURE — 99214 OFFICE O/P EST MOD 30 MIN: CPT | Mod: PBBFAC | Performed by: INTERNAL MEDICINE

## 2023-08-21 RX ORDER — AMLODIPINE BESYLATE 10 MG/1
10 TABLET ORAL DAILY
Qty: 90 TABLET | Refills: 3 | Status: SHIPPED | OUTPATIENT
Start: 2023-08-21 | End: 2024-02-21 | Stop reason: SDUPTHER

## 2023-08-21 NOTE — PROGRESS NOTES
HISTORY:    68-year-old female with a history of paroxysmal atrial fibrillation, hyperlipidemia, aortic atherosclerosis, +tob, GERD, and anxiety presenting for follow-up.    Patient had an admission for UTI status post treatment May 2023.  Noted to be in atrial fibrillation on presentation to the ED. Only clinical event. Converted to normal sinus rhythm spontaneously.     Occasional, non-limiting palpitations lasting less than a minute on a few occasions since.    Had a normal NST for intermittent chest tightness. Occurring 1-2x/week lasting a minute at a time. Not neceesarily exertional, but patient feels like she is dragging. No SOB.    Activity levels at baseline are moderate. Pt stays active during the days without dedicated exercise. Completes ADLs and cares for her own house. Helps with care of grandchildren. + fatigue.     The patient denies any previous history of myocardial infarction, coronary artery disease, peripheral arterial disease, stroke, congestive heart failure, or cardiomyopathy.    She currently tolerates metoprolol 50 x 2 and apixiban 5x2, and rosuvastatin 10 qod.  Bps elevated at home.     PHYSICAL EXAM:    Vitals:    08/21/23 0902   BP: (!) 151/89   Pulse: (!) 55       NAD, A+Ox3.  No jvd, no bruit.  RRR nml s1,s2. No murmurs.  CTA B no wheezes or crackles.  No edema.    LABS/STUDIES (imaging reviewed during clinic visit):    May 2023 hemoglobin 14.0.  Creatinine 0.6 with a BUN of 5.  Albumin 3.9.   and HDL 93.  Triglycerides 74.  Although renin ratio 26.4.  Normal epi/norepi.  Normal metanephrine/normetanephrine.  A1c normal.    ECG July 202e3 SR no Qs, non-specific st. May 2023 one ecg with sinus rhythm with no Q-waves and non-specific Sts after an ecg with afib.  Holter May 2023 demonstrates sinus rhythm with average heart rate 83 beats per minute.  PACs and PVCs noted with a less than 1% burden.  No evidence of atrial fibrillation.  TTE May 2023 normal LV size with concentric  remodeling and an EF of 50 55%.  Normal diastology.  CVP 3.    BASSAM 2022 to 2 minutes and 44 seconds on a medium ramp protocol.  No evidence of ischemia with normal TTE at baseline.    NST July 2023 normal LV size and ejection fraction.  No evidence of ischemia.  Chest x-ray May 2023 clear with a normal heart size.  Aortic arch calcification noted.    Arterial duplex 2023 no evidence of significant stenosis bilaterally.  CT abdomen pelvis 2023 aortic calcification.      ASSESSMENT & PLAN:    1. Paroxysmal atrial fibrillation    2. Aortic atherosclerosis    3. Hyperlipidemia, unspecified hyperlipidemia type    4. Hypertension, unspecified type    5. Tobacco use              Orders Placed This Encounter    amLODIPine (NORVASC) 10 MG tablet          Patient complaining of chest discomfort with some ecg abnormality. NST normal. Will focus on RF modification.    Bps elevated. On metoprolol to 50x2, maybe taking 100x2? Start amlodipine 10x1.     Tolerating rosuvastatin 10 QOD. Increase to 10x1 and then 20x1 if tolerating.     Patient with paroxysmal atrial fibrillation. Minimal symptoms. Tolerating metoprolol. Chadsvasc 4. On apixiban 5x2.     Patient understands the benefits in quitting tob.    Start exercising.     Follow up in about 6 months (around 2/21/2024).      Rossana García MD

## 2023-08-24 ENCOUNTER — HOSPITAL ENCOUNTER (OUTPATIENT)
Dept: RADIOLOGY | Facility: HOSPITAL | Age: 68
Discharge: HOME | End: 2023-08-24
Attending: STUDENT IN AN ORGANIZED HEALTH CARE EDUCATION/TRAINING PROGRAM
Payer: MEDICARE

## 2023-08-24 DIAGNOSIS — Z87.42 HISTORY OF OVARIAN CYST: ICD-10-CM

## 2023-08-24 PROCEDURE — 76856 US EXAM PELVIC COMPLETE: CPT | Mod: TC

## 2023-08-24 PROCEDURE — 76856 US EXAM PELVIC COMPLETE: CPT | Mod: 26,,, | Performed by: RADIOLOGY

## 2023-08-24 PROCEDURE — 76830 US PELVIS COMP WITH TRANSVAG NON-OB (XPD): ICD-10-PCS | Mod: 26,,, | Performed by: RADIOLOGY

## 2023-08-24 PROCEDURE — 76830 TRANSVAGINAL US NON-OB: CPT | Mod: 26,,, | Performed by: RADIOLOGY

## 2023-08-24 PROCEDURE — 76856 US PELVIS COMP WITH TRANSVAG NON-OB (XPD): ICD-10-PCS | Mod: 26,,, | Performed by: RADIOLOGY

## 2023-09-15 ENCOUNTER — OFFICE VISIT (OUTPATIENT)
Dept: PRIMARY CARE CLINIC | Facility: CLINIC | Age: 68
End: 2023-09-15
Payer: MEDICARE

## 2023-09-15 ENCOUNTER — LAB VISIT (OUTPATIENT)
Dept: LAB | Facility: HOSPITAL | Age: 68
End: 2023-09-15
Attending: INTERNAL MEDICINE
Payer: MEDICARE

## 2023-09-15 VITALS
TEMPERATURE: 98 F | BODY MASS INDEX: 23.77 KG/M2 | OXYGEN SATURATION: 98 % | DIASTOLIC BLOOD PRESSURE: 84 MMHG | HEIGHT: 69 IN | SYSTOLIC BLOOD PRESSURE: 124 MMHG | WEIGHT: 160.5 LBS | HEART RATE: 58 BPM

## 2023-09-15 DIAGNOSIS — I10 BENIGN ESSENTIAL HYPERTENSION: ICD-10-CM

## 2023-09-15 DIAGNOSIS — F17.200 SMOKER: ICD-10-CM

## 2023-09-15 DIAGNOSIS — I48.0 PAROXYSMAL ATRIAL FIBRILLATION: Primary | ICD-10-CM

## 2023-09-15 DIAGNOSIS — E78.5 HYPERLIPIDEMIA, UNSPECIFIED HYPERLIPIDEMIA TYPE: ICD-10-CM

## 2023-09-15 DIAGNOSIS — Z12.2 ENCOUNTER FOR SCREENING FOR LUNG CANCER: ICD-10-CM

## 2023-09-15 DIAGNOSIS — R91.1 LUNG NODULE: ICD-10-CM

## 2023-09-15 DIAGNOSIS — Z79.01 CHRONIC ANTICOAGULATION: ICD-10-CM

## 2023-09-15 DIAGNOSIS — I48.0 PAROXYSMAL ATRIAL FIBRILLATION: ICD-10-CM

## 2023-09-15 DIAGNOSIS — F41.9 ANXIETY: ICD-10-CM

## 2023-09-15 DIAGNOSIS — Z87.891 PERSONAL HISTORY OF NICOTINE DEPENDENCE: ICD-10-CM

## 2023-09-15 LAB
ALBUMIN SERPL BCP-MCNC: 4 G/DL (ref 3.5–5.2)
ALP SERPL-CCNC: 62 U/L (ref 55–135)
ALT SERPL W/O P-5'-P-CCNC: 30 U/L (ref 10–44)
ANION GAP SERPL CALC-SCNC: 11 MMOL/L (ref 8–16)
AST SERPL-CCNC: 31 U/L (ref 10–40)
BASOPHILS # BLD AUTO: 0.03 K/UL (ref 0–0.2)
BASOPHILS NFR BLD: 0.4 % (ref 0–1.9)
BILIRUB SERPL-MCNC: 0.5 MG/DL (ref 0.1–1)
BUN SERPL-MCNC: 12 MG/DL (ref 8–23)
CALCIUM SERPL-MCNC: 9 MG/DL (ref 8.7–10.5)
CHLORIDE SERPL-SCNC: 101 MMOL/L (ref 95–110)
CO2 SERPL-SCNC: 26 MMOL/L (ref 23–29)
CREAT SERPL-MCNC: 0.7 MG/DL (ref 0.5–1.4)
DIFFERENTIAL METHOD: ABNORMAL
EOSINOPHIL # BLD AUTO: 0.2 K/UL (ref 0–0.5)
EOSINOPHIL NFR BLD: 2.1 % (ref 0–8)
ERYTHROCYTE [DISTWIDTH] IN BLOOD BY AUTOMATED COUNT: 12.8 % (ref 11.5–14.5)
EST. GFR  (NO RACE VARIABLE): >60 ML/MIN/1.73 M^2
GLUCOSE SERPL-MCNC: 96 MG/DL (ref 70–110)
HCT VFR BLD AUTO: 45.4 % (ref 37–48.5)
HGB BLD-MCNC: 15 G/DL (ref 12–16)
IMM GRANULOCYTES # BLD AUTO: 0.02 K/UL (ref 0–0.04)
IMM GRANULOCYTES NFR BLD AUTO: 0.2 % (ref 0–0.5)
LYMPHOCYTES # BLD AUTO: 3 K/UL (ref 1–4.8)
LYMPHOCYTES NFR BLD: 37.5 % (ref 18–48)
MCH RBC QN AUTO: 32.2 PG (ref 27–31)
MCHC RBC AUTO-ENTMCNC: 33 G/DL (ref 32–36)
MCV RBC AUTO: 97 FL (ref 82–98)
MONOCYTES # BLD AUTO: 0.6 K/UL (ref 0.3–1)
MONOCYTES NFR BLD: 7.3 % (ref 4–15)
NEUTROPHILS # BLD AUTO: 4.2 K/UL (ref 1.8–7.7)
NEUTROPHILS NFR BLD: 52.5 % (ref 38–73)
NRBC BLD-RTO: 0 /100 WBC
PLATELET # BLD AUTO: 293 K/UL (ref 150–450)
PMV BLD AUTO: 10.3 FL (ref 9.2–12.9)
POTASSIUM SERPL-SCNC: 4.5 MMOL/L (ref 3.5–5.1)
PROT SERPL-MCNC: 6.8 G/DL (ref 6–8.4)
RBC # BLD AUTO: 4.66 M/UL (ref 4–5.4)
SODIUM SERPL-SCNC: 138 MMOL/L (ref 136–145)
WBC # BLD AUTO: 8.03 K/UL (ref 3.9–12.7)

## 2023-09-15 PROCEDURE — 99999 PR PBB SHADOW E&M-EST. PATIENT-LVL IV: ICD-10-PCS | Mod: PBBFAC,,, | Performed by: INTERNAL MEDICINE

## 2023-09-15 PROCEDURE — 36415 COLL VENOUS BLD VENIPUNCTURE: CPT | Mod: PN | Performed by: INTERNAL MEDICINE

## 2023-09-15 PROCEDURE — 99214 OFFICE O/P EST MOD 30 MIN: CPT | Mod: PBBFAC,PN | Performed by: INTERNAL MEDICINE

## 2023-09-15 PROCEDURE — 99214 PR OFFICE/OUTPT VISIT, EST, LEVL IV, 30-39 MIN: ICD-10-PCS | Mod: S$PBB,,, | Performed by: INTERNAL MEDICINE

## 2023-09-15 PROCEDURE — 85025 COMPLETE CBC W/AUTO DIFF WBC: CPT | Performed by: INTERNAL MEDICINE

## 2023-09-15 PROCEDURE — 99214 OFFICE O/P EST MOD 30 MIN: CPT | Mod: S$PBB,,, | Performed by: INTERNAL MEDICINE

## 2023-09-15 PROCEDURE — 99999 PR PBB SHADOW E&M-EST. PATIENT-LVL IV: CPT | Mod: PBBFAC,,, | Performed by: INTERNAL MEDICINE

## 2023-09-15 PROCEDURE — 80053 COMPREHEN METABOLIC PANEL: CPT | Performed by: INTERNAL MEDICINE

## 2023-09-15 RX ORDER — LORAZEPAM 0.5 MG/1
0.5 TABLET ORAL DAILY PRN
Qty: 30 TABLET | Refills: 3 | Status: SHIPPED | OUTPATIENT
Start: 2023-09-15

## 2023-09-15 NOTE — PROGRESS NOTES
"Subjective:       Patient ID: Karina Thomas is a 68 y.o. female.    Chief Complaint: hyperlipidemia    HPI  New onset afib while she was in the hosp w/ urosepsis 5/2023.   Since then, has followed up w/ Dr. García in cardiology - LOV 8/21/23.   BPs elevated at home despite metoprolol tartrate 50mg BID. Dr. García added amlodipine 10mg qd.   Continued on eliquis 5mg BID and recommended starting exercise.   Echo 5/8/23 reviewed. Mild AV sclerosis.  Checks her BP a few times a week. Previously was in the 150s/90s. Now it's more like 120s-130s. Every now and then feels a flutter but otherwise doing fine.   No blood    HLD - crestor 10mg qod and just increased to daily.   Arch calcification on CXR 5/12/23    MMG scheduled for 10/10/23  Stable R ovarian cyst on pelvic US 8/24/23. Repeat in 1 yr. Follows w/ Dr. Pineda.    Lung nodules on CT chest lung CA screening. Smokes 1/2 ppd Due to repeat CT chest.  No SOB/wheezing. When she cleans w/ chemicals it irritates her lungs and would have to albuterol.     Occasional anxiety that started when she moved her mother to [facility] in 2018 - previously was taking ativan 0.5mg every 2-3 days but in the last mo, looks like may be taking it daily per .     Sometimes w/ upper back pain and neck pain. Started taking Gas x once a day and it resolved. Still exercising.   Started taking D mannose due to h/o UTI. No dysuria/urinary frequency.     Review of Systems  Comprehensive review of systems otherwise negative. See history/subjective section for more details.    Objective:      Physical Exam    /84 (BP Location: Right arm, Patient Position: Sitting, BP Method: Large (Manual))   Pulse (!) 58   Temp 98.4 °F (36.9 °C) (Oral)   Ht 5' 9" (1.753 m)   Wt 72.8 kg (160 lb 7.9 oz)   SpO2 98%   BMI 23.70 kg/m²     GEN - A+OX4, NAD   HEENT - PERRL, EOMI, OP clear. MMM. TM normal.   Neck - No thyromegaly or cervical LAD. .  CV - RRR, no m/r   Chest - CTAB, no wheezing or rhonchi. Normal " work of breathing.   Abd - S/NT/ND/+BS.   Ext - 2+BDP and radial pulses. No C/C/E.  Neuro - 5/5 BUE and BLE strength. Normal gait.   Skin - small bruises of BUE and BLE.     Previous labs reviewed.     Assessment/Plan     Diagnoses and all orders for this visit:    Paroxysmal atrial fibrillation - doing well on current meds. F/u w/ Dr. García. Repeat CBC since on eliquis now.   -     CBC Auto Differential; Future    Benign essential hypertension - Stable and controlled. Continue current medications.  -     Comprehensive Metabolic Panel; Future    Chronic anticoagulation  -     CBC Auto Differential; Future    Hyperlipidemia, unspecified hyperlipidemia type - cont crestor 10.   -     Comprehensive Metabolic Panel; Future    Smoker - not ready to quit.   -     CT Chest Lung Screening Low Dose; Future    Anxiety - rec to try cutting in half. Still has about 8 tabs left from last fill 7/24.   -     LORazepam (ATIVAN) 0.5 MG tablet; Take 1 tablet (0.5 mg total) by mouth daily as needed for Anxiety.    Lung nodule  -     CT Chest Lung Screening Low Dose; Future    Encounter for screening for lung cancer  -     CT Chest Lung Screening Low Dose; Future    Personal history of nicotine dependence  -     CT Chest Lung Screening Low Dose; Future    Declines flu vaccine today.   Has HCPOA and living will at home (drawn by ).    Advance Care Planning     Date: 09/15/2023    Power of   I initiated the process of voluntary advance care planning today and explained the importance of this process to the patient.  I introduced the concept of advance directives to the patient, as well. Then the patient received detailed information about the importance of designating a Health Care Power of  (HCPOA). She was also instructed to communicate with this person about their wishes for future healthcare, should she become sick and lose decision-making capacity. The patient has previously appointed a HCPOA. After our  discussion, the patient has decided to eventually bring in her completed HCPOA and has appointed her significant other, health care agent:  Sig  & health care agent number:  [phone]  I spent a total time of 5 minutes discussing this issue with the patient.         Follow up in about 5 months (around 2/15/2024).      Pinky Reyes MD  Department of Internal Medicine - [facility]  8:14 AM

## 2023-09-25 ENCOUNTER — PATIENT MESSAGE (OUTPATIENT)
Dept: PRIMARY CARE CLINIC | Facility: CLINIC | Age: 68
End: 2023-09-25
Payer: MEDICARE

## 2023-09-25 RX ORDER — ESOMEPRAZOLE MAGNESIUM 40 MG/1
40 CAPSULE, DELAYED RELEASE ORAL DAILY
Qty: 90 CAPSULE | Refills: 3 | Status: SHIPPED | OUTPATIENT
Start: 2023-09-25

## 2023-10-09 ENCOUNTER — PATIENT MESSAGE (OUTPATIENT)
Dept: PRIMARY CARE CLINIC | Facility: CLINIC | Age: 68
End: 2023-10-09
Payer: MEDICARE

## 2023-10-10 ENCOUNTER — HOSPITAL ENCOUNTER (OUTPATIENT)
Dept: RADIOLOGY | Facility: HOSPITAL | Age: 68
Discharge: HOME | End: 2023-10-10
Attending: STUDENT IN AN ORGANIZED HEALTH CARE EDUCATION/TRAINING PROGRAM
Payer: MEDICARE

## 2023-10-10 VITALS — WEIGHT: 160 LBS | BODY MASS INDEX: 23.7 KG/M2 | HEIGHT: 69 IN

## 2023-10-10 DIAGNOSIS — Z12.31 BREAST CANCER SCREENING BY MAMMOGRAM: ICD-10-CM

## 2023-10-10 PROCEDURE — 77067 SCR MAMMO BI INCL CAD: CPT | Mod: 26,,, | Performed by: RADIOLOGY

## 2023-10-10 PROCEDURE — 77067 MAMMO DIGITAL SCREENING BILAT WITH TOMO: ICD-10-PCS | Mod: 26,,, | Performed by: RADIOLOGY

## 2023-10-10 PROCEDURE — 77067 SCR MAMMO BI INCL CAD: CPT | Mod: TC

## 2023-10-10 PROCEDURE — 77063 MAMMO DIGITAL SCREENING BILAT WITH TOMO: ICD-10-PCS | Mod: 26,,, | Performed by: RADIOLOGY

## 2023-10-10 PROCEDURE — 77063 BREAST TOMOSYNTHESIS BI: CPT | Mod: 26,,, | Performed by: RADIOLOGY

## 2023-10-25 ENCOUNTER — PATIENT MESSAGE (OUTPATIENT)
Dept: PRIMARY CARE CLINIC | Facility: CLINIC | Age: 68
End: 2023-10-25
Payer: MEDICARE

## 2023-10-25 NOTE — TELEPHONE ENCOUNTER
Please review pt message. I am having her stop Pepto.  Sravan made appt for eval @ beginning of Nov.

## 2023-11-08 ENCOUNTER — OFFICE VISIT (OUTPATIENT)
Dept: PRIMARY CARE CLINIC | Facility: CLINIC | Age: 68
End: 2023-11-08
Payer: MEDICARE

## 2023-11-08 ENCOUNTER — PATIENT MESSAGE (OUTPATIENT)
Dept: PRIMARY CARE CLINIC | Facility: CLINIC | Age: 68
End: 2023-11-08
Payer: MEDICARE

## 2023-11-08 VITALS
OXYGEN SATURATION: 97 % | TEMPERATURE: 98 F | BODY MASS INDEX: 23.94 KG/M2 | HEIGHT: 69 IN | WEIGHT: 161.63 LBS | HEART RATE: 69 BPM | DIASTOLIC BLOOD PRESSURE: 86 MMHG | SYSTOLIC BLOOD PRESSURE: 124 MMHG

## 2023-11-08 DIAGNOSIS — F41.9 ANXIETY: Primary | ICD-10-CM

## 2023-11-08 DIAGNOSIS — R29.818 TRANSIENT NEUROLOGIC DEFICIT: ICD-10-CM

## 2023-11-08 DIAGNOSIS — F51.01 PRIMARY INSOMNIA: ICD-10-CM

## 2023-11-08 DIAGNOSIS — R11.0 NAUSEA IN ADULT: ICD-10-CM

## 2023-11-08 PROCEDURE — 99212 OFFICE O/P EST SF 10 MIN: CPT | Mod: S$PBB,,, | Performed by: HOSPITALIST

## 2023-11-08 PROCEDURE — 99999 PR PBB SHADOW E&M-EST. PATIENT-LVL V: ICD-10-PCS | Mod: PBBFAC,,, | Performed by: HOSPITALIST

## 2023-11-08 PROCEDURE — 99212 PR OFFICE/OUTPT VISIT, EST, LEVL II, 10-19 MIN: ICD-10-PCS | Mod: S$PBB,,, | Performed by: HOSPITALIST

## 2023-11-08 PROCEDURE — 99999 PR PBB SHADOW E&M-EST. PATIENT-LVL V: CPT | Mod: PBBFAC,,, | Performed by: HOSPITALIST

## 2023-11-08 PROCEDURE — 99215 OFFICE O/P EST HI 40 MIN: CPT | Mod: PBBFAC,PN | Performed by: HOSPITALIST

## 2023-11-08 RX ORDER — MIRTAZAPINE 15 MG/1
15 TABLET, FILM COATED ORAL NIGHTLY
Qty: 30 TABLET | Refills: 0 | Status: SHIPPED | OUTPATIENT
Start: 2023-11-08 | End: 2024-02-26

## 2023-11-08 NOTE — ASSESSMENT & PLAN NOTE
Intermittent hemiparesthesias c/f intracranial process such as meningioma compressing sensory area of L prefrontal cortex.  MRI brain w/wo contrast ordered.  Pt reports severe claustrophobia which prevented previous attempts to get MRIs.  Suggesting self-premedicating with a lorazepam prior, but she says this will not be sufficient to enable her to tolerate it.  Will order 1mg Versed with repeat dose if needed for MRI.  Pt interested in trying Open MRI if covered by her insurance; in which case she shouldn't require any medication to tolerate.

## 2023-11-08 NOTE — PROGRESS NOTES
"Primary Care Provider Appointment - [facility]  Angel Retana MD      Subjective:      Patient ID: Karina Thomas is a 68 y.o. female with   Past Medical History:   Diagnosis Date    Anxiety     GERD (gastroesophageal reflux disease)     Hyperlipidemia            Chief Complaint: Tingling (Numbnes), Nausea, and Flank Pain    Prior to this visit, patient's last encounter with PCP was 9/15/2023.    Pt reports feeling "not great" since hospitalization.  Reports lots of anxiety making sleep difficult sometimes and interrupting normal activities to go check on things.  Has some Ativan but scared to use too frequently.  Also reports intermittent numbness/tingling on R side of body, feeling like "leg going to sleep."  RLE feels "cold." Also has occasional shooting pain from R buttock.  R-sided sx occur couple times a week.  No identifiable trigger, can happen w/ activity or rest.  Lasts "maybe an hour."   Onset >1 m/a but not sure of onset.  No change in motor skills or weakness.  Has also been having stomach issues; queasiness.  Sometimes feels bloating.  Was taking pepto-bismol frequently. Has been present since May or June.  Has been on Esomeprazole for "years" started by her GI doc for GERD sx.  New meds after d/c in May were Eliquis, metoprolol, amlodipine.  No color change in stool other than when taking a lot of pepto-bismol.      4Ms for Medical Decision-Making in Older Adults    Last Completed EAWV: None    MOBILITY:  Get Up and Go:       No data to display              Activities of Daily Living:       No data to display              Whisper Test:       No data to display              Disability Status:       No data to display              Nutrition Screening:       No data to display             Screening Score: 0-7 Malnourished, 8-11 At Risk, 12-14 Normal    MENTATION:   Depression Patient Health Questionnaire:      5/31/2023     1:23 PM   Depression Patient Health Questionnaire   Over the last two weeks how " often have you been bothered by little interest or pleasure in doing things Not at all   Over the last two weeks how often have you been bothered by feeling down, depressed or hopeless Not at all   PHQ-2 Total Score 0     Has Dementia Dx: No    Cognitive Function Screening:       No data to display              Cognitive Function Screening Total - Less than 4 = Abnormal,  Greater than or equal to 4 = Normal    MEDICATIONS:  High Risk Medications:  Total Active Medications: 1  LORazepam - 0.5 MG    WHAT MATTERS MOST:  Advance Care Planning   ACP Status:   Patient has had an ACP conversation  Living Will: No  Power of : No  LaPOST: No    Pt reminded about need to complete ACP documentation; will try again next visit to do LaPOST.    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     Walks about 2 days a week. Has a FITBIT and goes 10k steps every day.      Social Determinants of Health     Financial Resource Strain: Low Risk  (11/6/2023)    Overall Financial Resource Strain (CARDIA)     Difficulty of Paying Living Expenses: Not hard at all   Food Insecurity: No Food Insecurity (11/6/2023)    Hunger Vital Sign     Worried About Running Out of Food in the Last Year: Never true     Ran Out of Food in the Last Year: Never true   Transportation Needs: No Transportation Needs (11/6/2023)    PRAPARE - Transportation     Lack of Transportation  "(Medical): No     Lack of Transportation (Non-Medical): No   Physical Activity: Insufficiently Active (11/6/2023)    Exercise Vital Sign     Days of Exercise per Week: 3 days     Minutes of Exercise per Session: 20 min   Stress: Stress Concern Present (11/6/2023)    [place] [employer] - Occupational Stress Questionnaire     Feeling of Stress : To some extent   Social Connections: Unknown (11/6/2023)    Social Connection and Isolation Panel [NHANES]     Frequency of Communication with Friends and Family: More than three times a week     Frequency of Social Gatherings with Friends and Family: Once a week     Active Member of Clubs or Organizations: No     Attends Club or Organization Meetings: 1 to 4 times per year     Marital Status:    Housing Stability: Low Risk  (11/6/2023)    Housing Stability Vital Sign     Unable to Pay for Housing in the Last Year: No     Number of Places Lived in the Last Year: 1     Unstable Housing in the Last Year: No       Review of Systems   HENT:  Positive for ear pain (R, stuffy; improved after putting hydrogen peroxide).    Gastrointestinal:  Positive for constipation (occasional) and nausea. Negative for abdominal pain, blood in stool, change in bowel habit and reflux.   Neurological:  Positive for light-headedness (occasionally w/ episodes of R hemiparasthesias) and numbness. Negative for dizziness, facial asymmetry, weakness, headaches and coordination difficulties.   Hematological:  Bruises/bleeds easily.   Psychiatric/Behavioral:  Positive for sleep disturbance. The patient is nervous/anxious.         Objective:   /86 (BP Location: Right arm, Patient Position: Sitting, BP Method: Large (Manual))   Pulse 69   Temp 98.4 °F (36.9 °C) (Oral)   Ht 5' 9" (1.753 m)   Wt 73.3 kg (161 lb 9.6 oz)   SpO2 97%   BMI 23.86 kg/m²     Physical Exam  Vitals reviewed.   Constitutional:       General: She is not in acute distress.     Appearance: Normal " appearance.   HENT:      Head: Normocephalic and atraumatic.      Right Ear: Tympanic membrane and external ear normal.      Ears:      Comments: Some retained hardened cerumen in canal     Mouth/Throat:      Mouth: Mucous membranes are moist.      Pharynx: Oropharynx is clear.   Eyes:      General: No scleral icterus.     Extraocular Movements: Extraocular movements intact.      Conjunctiva/sclera: Conjunctivae normal.      Pupils: Pupils are equal, round, and reactive to light.   Pulmonary:      Effort: Pulmonary effort is normal. No respiratory distress.   Skin:     General: Skin is warm and dry.      Findings: Bruising present.   Neurological:      General: No focal deficit present.      Mental Status: She is alert and oriented to person, place, and time.      Cranial Nerves: No cranial nerve deficit.      Sensory: No sensory deficit.      Motor: No weakness.      Coordination: Coordination normal.      Gait: Gait normal.              Lab Results   Component Value Date    WBC 8.03 09/15/2023    HGB 15.0 09/15/2023    HCT 45.4 09/15/2023     09/15/2023    CHOL 208 (H) 03/09/2023    TRIG 74 03/09/2023    HDL 93 (H) 03/09/2023    ALT 30 09/15/2023    AST 31 09/15/2023     09/15/2023    K 4.5 09/15/2023     09/15/2023    CREATININE 0.7 09/15/2023    BUN 12 09/15/2023    CO2 26 09/15/2023    TSH 1.454 05/31/2021    HGBA1C 4.8 05/08/2023       Current Outpatient Medications on File Prior to Visit   Medication Sig Dispense Refill    acetaminophen (TYLENOL) 500 MG tablet Take 1,000 mg by mouth every 8 (eight) hours as needed for Pain. (First choice)      albuterol (PROVENTIL/VENTOLIN HFA) 90 mcg/actuation inhaler Inhale 2 puffs into the lungs every 6 (six) hours as needed for Wheezing. Rescue 54 g 0    amLODIPine (NORVASC) 10 MG tablet Take 1 tablet (10 mg total) by mouth once daily. 90 tablet 3    apixaban (ELIQUIS) 5 mg Tab Take 1 tablet (5 mg total) by mouth 2 (two) times daily. 180 tablet 3     ascorbic acid (VITAMIN C ORAL) Take 1 tablet by mouth once daily.      calcium carbonate (TUMS) 200 mg calcium (500 mg) chewable tablet Take 2 tablets by mouth every 8 (eight) hours as needed for Heartburn.      ergocalciferol, vitamin D2, (VITAMIN D ORAL) Take 1 tablet by mouth once daily.      esomeprazole (NEXIUM) 40 MG capsule Take 1 capsule (40 mg total) by mouth once daily. 90 capsule 3    estradioL (ESTRACE) 0.01 % (0.1 mg/gram) vaginal cream Place 2 g vaginally every evening. For two weeks followed by twice weekly for maintenance 42.5 g 3    ibuprofen (ADVIL,MOTRIN) 200 MG tablet Take 400-600 mg by mouth every 8 (eight) hours as needed for Pain. (Second choice)      L. rhamnosus GG/inulin (CULTURELLE PROBIOTICS ORAL) Take 1 capsule by mouth once daily.      LORazepam (ATIVAN) 0.5 MG tablet Take 1 tablet (0.5 mg total) by mouth daily as needed for Anxiety. 30 tablet 3    magnesium hydroxide 400 mg/5 ml (MILK OF MAGNESIA) 400 mg/5 mL Susp Take 30 mLs by mouth daily as needed (constipation).      metoprolol tartrate (LOPRESSOR) 50 MG tablet Take 1 tablet (50 mg total) by mouth 2 (two) times daily. 180 tablet 3    omega-3 acid ethyl esters (LOVAZA) 1 gram capsule       rosuvastatin (CRESTOR) 20 MG tablet Take 1 tablet (20 mg total) by mouth once daily. 90 tablet 3    tacrolimus (PROTOPIC) 0.1 % ointment       triamcinolone acetonide 0.1% (KENALOG) 0.1 % cream APPLY TO ARM TWO TO FOUR TIMES A DAY AS NEEDED FOR RASH      ubidecarenone/vitamin E mixed (COQ10  ORAL) Take 1 tablet by mouth once daily.       No current facility-administered medications on file prior to visit.         Assessment:   68 y.o. female with multiple co-morbid illnesses here to follow-up with PCP and continue work-up of chronic issues    Plan:     Problem List Items Addressed This Visit       Anxiety - Primary     D/w pt difficulty developing adaptive coping mechanisms to normal life stressors w/ chronic use of pharmacologic crutches.   Referral to LCSW placed for counseling in building adaptive coping strategies.  May get some additional benefit w/ mirtazepine Rx'd for insomnia.         Relevant Orders    Ambulatory referral/consult to [facility]    Primary insomnia     Reviewed sleep hygiene w/ pt; anxiety largely issue it seems.  Referral placed to LCSW for counseling.  Mirtazepine 15mg QHS Rx'd as trial to see if this helps pending initiation of counseling.         Relevant Medications    mirtazapine (REMERON) 15 MG tablet    Transient neurologic deficit     Intermittent hemiparesthesias c/f intracranial process such as meningioma compressing sensory area of L prefrontal cortex.  MRI brain w/wo contrast ordered.  Pt reports severe claustrophobia which prevented previous attempts to get MRIs.  Suggesting self-premedicating with a lorazepam prior, but she says this will not be sufficient to enable her to tolerate it.  Will order 1mg Versed with repeat dose if needed for MRI.  Pt interested in trying Open MRI if covered by her insurance; in which case she shouldn't require any medication to tolerate.         Relevant Orders    MRI Brain W WO Contrast    Creatinine, serum    Nausea in adult     Suspect may be related to chronic PPI use.  Advised pt stop Nexium for a few weeks and see if it resolves.  If GERD sx arise can use TUMS or Pepcid PRN.            Health Maintenance         Date Due Completion Date    RSV Vaccine (Age 60+) (1 - 1-dose 60+ series) Never done ---    LDCT Lung Screen 06/22/2022 6/22/2021    Influenza Vaccine (1) 06/30/2024 (Originally 9/1/2023) 10/23/2020    TETANUS VACCINE 09/15/2024 (Originally 10/1/2015) 10/1/2005    Shingles Vaccine (1 of 2) 09/15/2024 (Originally 8/10/2016) 6/15/2016    COVID-19 Vaccine (4 - 2023-24 season) 09/15/2024 (Originally 9/1/2023) 10/26/2021    High Dose Statin 09/15/2024 9/15/2023    Mammogram 10/10/2024 10/10/2023    DEXA Scan 06/09/2025 6/9/2020    Colorectal  Cancer Screening 08/12/2025 8/12/2020    Override on 8/12/2020: Done    Lipid Panel 03/09/2028 3/9/2023        LDCT scheduled 11/15    Future Appointments   Date Time Provider Department Center   11/15/2023 10:15 AM [facility] OI-CT2 500 LB LIMIT [facility]   12/12/2023 11:00 AM [facility] [place]   2/21/2024 10:00 AM Rossana García MD [facility] [place]         Follow up in about 1 month (around 12/8/2023). Total clinical care time was 40 min.    Angel Retana MD  [facility]

## 2023-11-08 NOTE — ASSESSMENT & PLAN NOTE
Reviewed sleep hygiene w/ pt; anxiety largely issue it seems.  Referral placed to LCSW for counseling.  Mirtazepine 15mg QHS Rx'd as trial to see if this helps pending initiation of counseling.

## 2023-11-08 NOTE — TELEPHONE ENCOUNTER
Please remind the doctor that he will need to send a prescription to [facility] for whatever medication he wants me to take to keep me from freaking out before and during the MRI

## 2023-11-08 NOTE — ASSESSMENT & PLAN NOTE
Suspect may be related to chronic PPI use.  Advised pt stop Nexium for a few weeks and see if it resolves.  If GERD sx arise can use TUMS or Pepcid PRN.

## 2023-11-08 NOTE — ASSESSMENT & PLAN NOTE
D/w pt difficulty developing adaptive coping mechanisms to normal life stressors w/ chronic use of pharmacologic crutches.  Referral to LCSW placed for counseling in building adaptive coping strategies.  May get some additional benefit w/ mirtazepine Rx'd for insomnia.

## 2023-12-05 ENCOUNTER — PATIENT MESSAGE (OUTPATIENT)
Dept: PRIMARY CARE CLINIC | Facility: CLINIC | Age: 68
End: 2023-12-05
Payer: MEDICARE

## 2023-12-21 ENCOUNTER — PATIENT MESSAGE (OUTPATIENT)
Dept: PRIMARY CARE CLINIC | Facility: CLINIC | Age: 68
End: 2023-12-21
Payer: MEDICARE

## 2023-12-29 ENCOUNTER — PATIENT MESSAGE (OUTPATIENT)
Dept: CARDIOLOGY | Facility: CLINIC | Age: 68
End: 2023-12-29
Payer: MEDICARE

## 2024-01-03 ENCOUNTER — TELEPHONE (OUTPATIENT)
Dept: PRIMARY CARE CLINIC | Facility: CLINIC | Age: 69
End: 2024-01-03
Payer: MEDICARE

## 2024-01-03 NOTE — TELEPHONE ENCOUNTER
Lynn Naranjo Reyes [facility] Staff  Caller: [phone] (Today,  2:03 PM)  Patient is returning a phone call.  Who left a message for the patient: Queen Sravan  Does patient know what this is regarding:  appt  Would you like a call back, or a response through your [facility] portal?:   Call  Comments: appt time is okay

## 2024-01-11 DIAGNOSIS — Z00.00 ENCOUNTER FOR MEDICARE ANNUAL WELLNESS EXAM: ICD-10-CM

## 2024-01-17 ENCOUNTER — LAB VISIT (OUTPATIENT)
Dept: LAB | Facility: HOSPITAL | Age: 69
End: 2024-01-17
Attending: HOSPITALIST
Payer: MEDICARE

## 2024-01-17 ENCOUNTER — PATIENT MESSAGE (OUTPATIENT)
Dept: PRIMARY CARE CLINIC | Facility: CLINIC | Age: 69
End: 2024-01-17
Payer: MEDICARE

## 2024-01-17 DIAGNOSIS — R30.0 DYSURIA: Primary | ICD-10-CM

## 2024-01-17 DIAGNOSIS — R30.0 DYSURIA: ICD-10-CM

## 2024-01-17 LAB
BACTERIA #/AREA URNS AUTO: ABNORMAL /HPF
BILIRUB UR QL STRIP: NEGATIVE
CLARITY UR REFRACT.AUTO: ABNORMAL
COLOR UR AUTO: ABNORMAL
GLUCOSE UR QL STRIP: NEGATIVE
HGB UR QL STRIP: ABNORMAL
KETONES UR QL STRIP: NEGATIVE
LEUKOCYTE ESTERASE UR QL STRIP: ABNORMAL
MICROSCOPIC COMMENT: ABNORMAL
NITRITE UR QL STRIP: NEGATIVE
PH UR STRIP: 5 [PH] (ref 5–8)
PROT UR QL STRIP: NEGATIVE
RBC #/AREA URNS AUTO: 0 /HPF (ref 0–4)
SP GR UR STRIP: 1 (ref 1–1.03)
SQUAMOUS #/AREA URNS AUTO: 5 /HPF
URN SPEC COLLECT METH UR: ABNORMAL
WBC #/AREA URNS AUTO: 34 /HPF (ref 0–5)
WBC CLUMPS UR QL AUTO: ABNORMAL

## 2024-01-17 PROCEDURE — 87086 URINE CULTURE/COLONY COUNT: CPT | Performed by: HOSPITALIST

## 2024-01-17 PROCEDURE — 81001 URINALYSIS AUTO W/SCOPE: CPT | Performed by: HOSPITALIST

## 2024-01-17 PROCEDURE — 87077 CULTURE AEROBIC IDENTIFY: CPT | Performed by: HOSPITALIST

## 2024-01-17 PROCEDURE — 87186 SC STD MICRODIL/AGAR DIL: CPT | Performed by: HOSPITALIST

## 2024-01-17 PROCEDURE — 87088 URINE BACTERIA CULTURE: CPT | Performed by: HOSPITALIST

## 2024-01-18 DIAGNOSIS — N30.00 ACUTE CYSTITIS WITHOUT HEMATURIA: Primary | ICD-10-CM

## 2024-01-18 RX ORDER — NITROFURANTOIN 25; 75 MG/1; MG/1
100 CAPSULE ORAL 2 TIMES DAILY
Qty: 6 CAPSULE | Refills: 0 | Status: SHIPPED | OUTPATIENT
Start: 2024-01-18 | End: 2024-01-20

## 2024-01-19 LAB — BACTERIA UR CULT: ABNORMAL

## 2024-01-20 ENCOUNTER — PATIENT MESSAGE (OUTPATIENT)
Dept: PRIMARY CARE CLINIC | Facility: CLINIC | Age: 69
End: 2024-01-20
Payer: MEDICARE

## 2024-01-20 ENCOUNTER — NURSE TRIAGE (OUTPATIENT)
Dept: ADMINISTRATIVE | Facility: CLINIC | Age: 69
End: 2024-01-20
Payer: MEDICARE

## 2024-01-20 DIAGNOSIS — N30.00 ACUTE CYSTITIS WITHOUT HEMATURIA: Primary | ICD-10-CM

## 2024-01-20 RX ORDER — CEPHALEXIN 500 MG/1
500 CAPSULE ORAL 2 TIMES DAILY
Qty: 10 CAPSULE | Refills: 0 | Status: SHIPPED | OUTPATIENT
Start: 2024-01-20 | End: 2024-01-25

## 2024-01-20 NOTE — TELEPHONE ENCOUNTER
Started on abx for UTI recently & further care pending culture. Pt  received notification that culture resulted, wanting to know if further care is needed. Discussed with MD calling in to discuss results if she had not heard back. Dysuria & lower back back improved some but temporary with tylenol.      Per Dr. Retana-will log in shortly & f/u with pt. Pt updated. No further assistance needed at this time.     Reason for Disposition   [1] Taking antibiotic < 72 hours (3 days) for UTI AND [2] painful urination or frequency is SAME (unchanged, not better)    Additional Information   Negative: Shock suspected (e.g., cold/pale/clammy skin, too weak to stand, low BP, rapid pulse)   Negative: Sounds like a life-threatening emergency to the triager   Negative: [1] Unable to urinate (or only a few drops) > 4 hours AND [2] bladder feels very full (e.g., palpable bladder or strong urge to urinate)   Negative: Passing pure blood or large blood clots (i.e., size > a dime)  (Exceptions: Flecks, small strands, or pinkish-red color)   Negative: Fever > 103 F (39.4 C)   Negative: Patient sounds very sick or weak to the triager   Negative: [1] SEVERE pain (e.g., excruciating) AND [2] no improvement 2 hours after pain medications   Negative: [1] Fever > 100.0 F (37.8 C) AND [2] new-onset since starting antibiotics   Negative: [1] Side (flank) or lower back pain AND [2] new-onset since starting antibiotics   Negative: [1] Taking antibiotic > 24 hours for UTI AND [2] flank or lower back pain getting WORSE   Negative: [1] Vomiting 2 or more times AND [2] interferes with taking oral antibiotic   Negative: [1] Taking antibiotic > 24 hours for UTI (urinary tract or bladder infection) AND [2] fever persists (still has fever)   Negative: [1] Taking antibiotic > 72 hours (3 days) for UTI AND [2] painful urination or frequency is SAME (unchanged, not better)   Negative: [1] Taking antibiotic > 72 hours (3 days) for UTI AND [2] flank or lower  back pain is SAME (unchanged, not better)   Negative: Diabetes mellitus or weak immune system (e.g., HIV positive, cancer chemo, splenectomy, organ transplant, chronic steroids)   Negative: Sickle cell disease   Negative: [1] Taking antibiotic < 24 hours for UTI AND [2] fever persists (still has fever)    Protocols used: Urinary Tract Infection on Antibiotic Follow-up Call - Female-A-

## 2024-01-22 NOTE — TELEPHONE ENCOUNTER
It looks like the only antibiotic with diminished effectiveness against this bacteria is the one I prescribed!  I'll send a new Rx for a different antibiotic for you, which should then knock this out.   Written by Angel Retana MD on 1/20/2024  2:51 PM CST  Seen by patient Karina Thomas on 1/20/2024  2:53 PM

## 2024-01-30 DIAGNOSIS — R06.2 WHEEZING: ICD-10-CM

## 2024-01-30 RX ORDER — ALBUTEROL SULFATE 90 UG/1
2 AEROSOL, METERED RESPIRATORY (INHALATION) EVERY 6 HOURS PRN
Qty: 54 G | Refills: 0 | Status: SHIPPED | OUTPATIENT
Start: 2024-01-30 | End: 2024-05-20 | Stop reason: SDUPTHER

## 2024-02-21 ENCOUNTER — OFFICE VISIT (OUTPATIENT)
Dept: CARDIOLOGY | Facility: CLINIC | Age: 69
End: 2024-02-21
Payer: MEDICARE

## 2024-02-21 VITALS
SYSTOLIC BLOOD PRESSURE: 111 MMHG | HEART RATE: 67 BPM | WEIGHT: 160 LBS | DIASTOLIC BLOOD PRESSURE: 74 MMHG | HEIGHT: 69 IN | BODY MASS INDEX: 23.7 KG/M2

## 2024-02-21 DIAGNOSIS — I70.0 AORTIC ATHEROSCLEROSIS: ICD-10-CM

## 2024-02-21 DIAGNOSIS — Z72.0 TOBACCO USE: ICD-10-CM

## 2024-02-21 DIAGNOSIS — I10 HYPERTENSION, UNSPECIFIED TYPE: ICD-10-CM

## 2024-02-21 DIAGNOSIS — I48.0 PAROXYSMAL ATRIAL FIBRILLATION: ICD-10-CM

## 2024-02-21 DIAGNOSIS — E78.5 HYPERLIPIDEMIA, UNSPECIFIED HYPERLIPIDEMIA TYPE: Primary | ICD-10-CM

## 2024-02-21 PROCEDURE — 99999 PR PBB SHADOW E&M-EST. PATIENT-LVL IV: CPT | Mod: PBBFAC,,, | Performed by: INTERNAL MEDICINE

## 2024-02-21 PROCEDURE — 99214 OFFICE O/P EST MOD 30 MIN: CPT | Mod: PBBFAC | Performed by: INTERNAL MEDICINE

## 2024-02-21 PROCEDURE — 99214 OFFICE O/P EST MOD 30 MIN: CPT | Mod: S$PBB,,, | Performed by: INTERNAL MEDICINE

## 2024-02-21 RX ORDER — AMLODIPINE BESYLATE 10 MG/1
10 TABLET ORAL DAILY
Qty: 90 TABLET | Refills: 3 | Status: SHIPPED | OUTPATIENT
Start: 2024-02-21

## 2024-02-21 RX ORDER — METOPROLOL TARTRATE 50 MG/1
50 TABLET ORAL 2 TIMES DAILY
Qty: 180 TABLET | Refills: 3 | Status: SHIPPED | OUTPATIENT
Start: 2024-02-21

## 2024-02-21 RX ORDER — ROSUVASTATIN CALCIUM 20 MG/1
20 TABLET, COATED ORAL DAILY
Qty: 90 TABLET | Refills: 3 | Status: SHIPPED | OUTPATIENT
Start: 2024-02-21 | End: 2024-06-03 | Stop reason: SDUPTHER

## 2024-02-21 NOTE — PROGRESS NOTES
HISTORY:    68-year-old female with a history of paroxysmal atrial fibrillation, hyperlipidemia, aortic atherosclerosis, +tob, GERD, and anxiety presenting for follow-up.    Patient had an admission for UTI status post treatment May 2023.  Noted to be in atrial fibrillation on presentation to the ED. Only clinical event. Converted to normal sinus rhythm spontaneously.     No c/o palpitations at this time.    Had a normal NST for intermittent chest tightness. Occurring 1-2x/week lasting a minute at a time. Not neceesarily exertional, but patient feels like she is dragging. No SOB.    Activity levels at baseline are moderate. Pt stays active during the days without dedicated exercise. Completes ADLs and cares for her own house. Helps with care of grandchildren. + fatigue.     The patient denies any previous history of myocardial infarction, coronary artery disease, peripheral arterial disease, stroke, congestive heart failure, or cardiomyopathy.    She currently tolerates metoprolol 50 x 2, amlodipine 10 x 1, and apixiban 5x2, and rosuvastatin 20 x 1.      PHYSICAL EXAM:    Vitals:    02/21/24 0930   BP: 111/74   Pulse: 67         NAD, A+Ox3.  No jvd, no bruit.  RRR nml s1,s2. No murmurs.  CTA B no wheezes or crackles.  No edema.    LABS/STUDIES (imaging reviewed during clinic visit):    September 2023 CBC and CMP normal.   and HDL 93.  Triglycerides 74.  Aldosterone renin ratio 26.4.  Normal epi/norepi.  Normal metanephrine/normetanephrine.  A1c normal.    ECG July 2023 SR no Qs, non-specific st. May 2023 one ecg with sinus rhythm with no Q-waves and non-specific Sts after an ecg with afib.  Holter May 2023 demonstrates sinus rhythm with average heart rate 83 beats per minute.  PACs and PVCs noted with a less than 1% burden.  No evidence of atrial fibrillation.  TTE May 2023 normal LV size with concentric remodeling and an EF of 50 55%.  Normal diastology.  CVP 3.    BASSAM 2022 to 2 minutes and 44 seconds on a  medium ramp protocol.  No evidence of ischemia with normal TTE at baseline.    NST July 2023 normal LV size and ejection fraction.  No evidence of ischemia.  Chest x-ray May 2023 clear with a normal heart size.  Aortic arch calcification noted.    Arterial duplex 2023 no evidence of significant stenosis bilaterally.  CT abdomen pelvis 2023 aortic calcification.      ASSESSMENT & PLAN:    1. Hyperlipidemia, unspecified hyperlipidemia type    2. Paroxysmal atrial fibrillation    3. Aortic atherosclerosis    4. Hypertension, unspecified type    5. Tobacco use              Orders Placed This Encounter    rosuvastatin (CRESTOR) 20 MG tablet    metoprolol tartrate (LOPRESSOR) 50 MG tablet    amLODIPine (NORVASC) 10 MG tablet    apixaban (ELIQUIS) 5 mg Tab          Bps controlled on metoprolol to 50x2 and amlodipine 10x1.    Tolerating rosuvastatin 20x1 now. LDL was 100 prior to this dose.    Patient with paroxysmal atrial fibrillation. Asymptomatic. Tolerating metoprolol. Chadsvasc 4. On apixiban 5x2.     Patient understands the benefits in quitting tob.    Start exercising.     Follow up in about 1 year (around 2/21/2025).      Rossana García MD

## 2024-02-23 ENCOUNTER — PATIENT MESSAGE (OUTPATIENT)
Dept: PRIMARY CARE CLINIC | Facility: CLINIC | Age: 69
End: 2024-02-23
Payer: MEDICARE

## 2024-02-26 ENCOUNTER — LAB VISIT (OUTPATIENT)
Dept: LAB | Facility: HOSPITAL | Age: 69
End: 2024-02-26
Attending: INTERNAL MEDICINE
Payer: MEDICARE

## 2024-02-26 ENCOUNTER — OFFICE VISIT (OUTPATIENT)
Dept: PRIMARY CARE CLINIC | Facility: CLINIC | Age: 69
End: 2024-02-26
Payer: MEDICARE

## 2024-02-26 VITALS
TEMPERATURE: 98 F | RESPIRATION RATE: 18 BRPM | WEIGHT: 160.06 LBS | OXYGEN SATURATION: 96 % | SYSTOLIC BLOOD PRESSURE: 118 MMHG | HEIGHT: 69 IN | DIASTOLIC BLOOD PRESSURE: 67 MMHG | BODY MASS INDEX: 23.71 KG/M2 | HEART RATE: 66 BPM

## 2024-02-26 DIAGNOSIS — I10 BENIGN ESSENTIAL HYPERTENSION: ICD-10-CM

## 2024-02-26 DIAGNOSIS — I48.0 PAROXYSMAL ATRIAL FIBRILLATION: Primary | ICD-10-CM

## 2024-02-26 DIAGNOSIS — E27.8 ADRENAL NODULE: ICD-10-CM

## 2024-02-26 DIAGNOSIS — G47.00 INSOMNIA, UNSPECIFIED TYPE: ICD-10-CM

## 2024-02-26 DIAGNOSIS — I48.0 PAROXYSMAL ATRIAL FIBRILLATION: ICD-10-CM

## 2024-02-26 DIAGNOSIS — J31.0 CHRONIC RHINITIS: ICD-10-CM

## 2024-02-26 DIAGNOSIS — Z79.01 CHRONIC ANTICOAGULATION: ICD-10-CM

## 2024-02-26 DIAGNOSIS — E78.5 HYPERLIPIDEMIA, UNSPECIFIED HYPERLIPIDEMIA TYPE: ICD-10-CM

## 2024-02-26 DIAGNOSIS — I70.0 AORTIC ATHEROSCLEROSIS: ICD-10-CM

## 2024-02-26 DIAGNOSIS — J41.0 SMOKERS' COUGH: ICD-10-CM

## 2024-02-26 PROBLEM — E27.9 ADRENAL NODULE: Status: RESOLVED | Noted: 2023-02-07 | Resolved: 2024-02-26

## 2024-02-26 LAB
ALBUMIN SERPL BCP-MCNC: 4.1 G/DL (ref 3.5–5.2)
ALP SERPL-CCNC: 55 U/L (ref 55–135)
ALT SERPL W/O P-5'-P-CCNC: 25 U/L (ref 10–44)
ANION GAP SERPL CALC-SCNC: 13 MMOL/L (ref 8–16)
AST SERPL-CCNC: 26 U/L (ref 10–40)
BASOPHILS # BLD AUTO: 0.02 K/UL (ref 0–0.2)
BASOPHILS NFR BLD: 0.3 % (ref 0–1.9)
BILIRUB SERPL-MCNC: 0.5 MG/DL (ref 0.1–1)
BUN SERPL-MCNC: 9 MG/DL (ref 8–23)
CALCIUM SERPL-MCNC: 9.5 MG/DL (ref 8.7–10.5)
CHLORIDE SERPL-SCNC: 102 MMOL/L (ref 95–110)
CHOLEST SERPL-MCNC: 183 MG/DL (ref 120–199)
CHOLEST/HDLC SERPL: 2.2 {RATIO} (ref 2–5)
CO2 SERPL-SCNC: 24 MMOL/L (ref 23–29)
CREAT SERPL-MCNC: 0.7 MG/DL (ref 0.5–1.4)
DIFFERENTIAL METHOD BLD: ABNORMAL
EOSINOPHIL # BLD AUTO: 0.1 K/UL (ref 0–0.5)
EOSINOPHIL NFR BLD: 1.8 % (ref 0–8)
ERYTHROCYTE [DISTWIDTH] IN BLOOD BY AUTOMATED COUNT: 12.9 % (ref 11.5–14.5)
EST. GFR  (NO RACE VARIABLE): >60 ML/MIN/1.73 M^2
GLUCOSE SERPL-MCNC: 83 MG/DL (ref 70–110)
HCT VFR BLD AUTO: 44.1 % (ref 37–48.5)
HDLC SERPL-MCNC: 83 MG/DL (ref 40–75)
HDLC SERPL: 45.4 % (ref 20–50)
HGB BLD-MCNC: 14.6 G/DL (ref 12–16)
IMM GRANULOCYTES # BLD AUTO: 0.03 K/UL (ref 0–0.04)
IMM GRANULOCYTES NFR BLD AUTO: 0.4 % (ref 0–0.5)
LDLC SERPL CALC-MCNC: 85.6 MG/DL (ref 63–159)
LYMPHOCYTES # BLD AUTO: 2.9 K/UL (ref 1–4.8)
LYMPHOCYTES NFR BLD: 40.8 % (ref 18–48)
MCH RBC QN AUTO: 32.5 PG (ref 27–31)
MCHC RBC AUTO-ENTMCNC: 33.1 G/DL (ref 32–36)
MCV RBC AUTO: 98 FL (ref 82–98)
MONOCYTES # BLD AUTO: 0.6 K/UL (ref 0.3–1)
MONOCYTES NFR BLD: 8.6 % (ref 4–15)
NEUTROPHILS # BLD AUTO: 3.4 K/UL (ref 1.8–7.7)
NEUTROPHILS NFR BLD: 48.1 % (ref 38–73)
NONHDLC SERPL-MCNC: 100 MG/DL
NRBC BLD-RTO: 0 /100 WBC
PLATELET # BLD AUTO: 281 K/UL (ref 150–450)
PMV BLD AUTO: 9.9 FL (ref 9.2–12.9)
POTASSIUM SERPL-SCNC: 3.9 MMOL/L (ref 3.5–5.1)
PROT SERPL-MCNC: 7 G/DL (ref 6–8.4)
RBC # BLD AUTO: 4.49 M/UL (ref 4–5.4)
SODIUM SERPL-SCNC: 139 MMOL/L (ref 136–145)
TRIGL SERPL-MCNC: 72 MG/DL (ref 30–150)
TSH SERPL DL<=0.005 MIU/L-ACNC: 1.06 UIU/ML (ref 0.4–4)
WBC # BLD AUTO: 7.06 K/UL (ref 3.9–12.7)

## 2024-02-26 PROCEDURE — 84443 ASSAY THYROID STIM HORMONE: CPT | Performed by: INTERNAL MEDICINE

## 2024-02-26 PROCEDURE — 80061 LIPID PANEL: CPT | Performed by: INTERNAL MEDICINE

## 2024-02-26 PROCEDURE — 99214 OFFICE O/P EST MOD 30 MIN: CPT | Mod: S$PBB,,, | Performed by: INTERNAL MEDICINE

## 2024-02-26 PROCEDURE — 85025 COMPLETE CBC W/AUTO DIFF WBC: CPT | Performed by: INTERNAL MEDICINE

## 2024-02-26 PROCEDURE — 36415 COLL VENOUS BLD VENIPUNCTURE: CPT | Mod: PN | Performed by: INTERNAL MEDICINE

## 2024-02-26 PROCEDURE — 80053 COMPREHEN METABOLIC PANEL: CPT | Performed by: INTERNAL MEDICINE

## 2024-02-26 PROCEDURE — 99999 PR PBB SHADOW E&M-EST. PATIENT-LVL III: CPT | Mod: PBBFAC,,, | Performed by: INTERNAL MEDICINE

## 2024-02-26 PROCEDURE — 99213 OFFICE O/P EST LOW 20 MIN: CPT | Mod: PBBFAC,PN | Performed by: INTERNAL MEDICINE

## 2024-02-26 RX ORDER — FLUTICASONE PROPIONATE 50 MCG
1 SPRAY, SUSPENSION (ML) NASAL DAILY
Qty: 16 G | Refills: 0 | Status: SHIPPED | OUTPATIENT
Start: 2024-02-26

## 2024-02-26 NOTE — PROGRESS NOTES
"Subjective:       Patient ID: Karina Thomas is a 68 y.o. female.    Chief Complaint: No chief complaint on file.    HPI  pAFib w/ spontaneous conversion to NSR.   LOV w/ Dr. García in cards 2/21/24.   No blood in stool or urine.     HTN - amlodipine 10mg daily, lopressor 50mg BID    HLD - crestor 20mg daily. Aortic calcification on CT A/P 2/3/23.     Insomnia - filled remeron but didn't take it b/c first thing it said was to not stop med suddenly and she didn't want anything dependent.   Didn't talk to anyone about it.   Some good nights and some bad nights.   Melatonin gives her crazy dreams.   Once every 2 weeks may take a tylenol PM. Reports works.     Back pain when she is constipation. May take milk of magnesia and once constipation goes away, back pain goes away. Constipation about once a week. May have BM once a week - normal. Not pellets. Drinking a lot of water. Does a lot of ADLs - keeps active.     R adrenal adenoma, stable 2021.     Head pressure and when she blows her nose, feels like her ears pop. Some room spinning sensation. Have not tried nasal sprays.   Pelvic US. Stable R ovarian cyst. Repeat in 8/24/23.     Still smoking about 1/4 of a pack daily. Not ready to quit due to anxiety, mostly family related.   Due for CT lung cancer screening. Slight cough throughout the day. If she does spit up anything, it's clear. Every now and then may have SOB that she attributes to afib. No wheezing.   Does have some anxiety. Rare use of lorazepam. Declines LCSW but will let me know if she changes her mind.     Review of Systems  Comprehensive review of systems otherwise negative. See history/subjective section for more details.    Objective:      Physical Exam    /67 (BP Location: Left arm, Patient Position: Sitting, BP Method: Medium (Manual))   Pulse 66   Temp 98.1 °F (36.7 °C) (Oral)   Resp 18   Ht 5' 9" (1.753 m)   Wt 72.6 kg (160 lb 0.9 oz)   SpO2 96%   BMI 23.64 kg/m²     GEN - A+OX4, NAD "   HEENT - PERRL, EOMI, OP W/ MILD ERYTHEMA. MMM. Tm DULLNESS B.   Neck - No thyromegaly or cervical LAD. No thyroid masses felt.  CV - RRR, no m/r   Chest - CTAB, no wheezing or rhonchi  Abd - S/NT/ND/+BS.   Ext - 2+BDP and radial pulses. No C/C/E.  Neuro - 5/5 BUE and BLE strength.  Skin - No rash. Multiple freckles.     Previous labs reviewed.     Assessment/Plan     Diagnoses and all orders for this visit:    Paroxysmal atrial fibrillation - cont eliquis and metoprolol. Rare palpitations. F/u w/ Dr. García.   -     CBC Auto Differential; Future  -     TSH; Future    Chronic anticoagulation  -     CBC Auto Differential; Future    Benign essential hypertension - Stable and controlled. Continue current medications.  -     Comprehensive Metabolic Panel; Future    Hyperlipidemia, unspecified hyperlipidemia type - cont crestor 20mg daily.   -     Comprehensive Metabolic Panel; Future  -     Lipid Panel; Future    Aortic atherosclerosis - cont crestor.   -     Comprehensive Metabolic Panel; Future  -     Lipid Panel; Future    Insomnia, unspecified type - never took remeron. Doing ok. Avoid benadryl due to age.     Adrenal nodule - f/u w/ endo. Stable in size.     Smokers' cough - not ready to quit smoking. Discussed LCSW but declines.   -     fluticasone propionate (FLONASE) 50 mcg/actuation nasal spray; 1 spray (50 mcg total) by Each Nostril route once daily.    Chronic rhinitis  -     fluticasone propionate (FLONASE) 50 mcg/actuation nasal spray; 1 spray (50 mcg total) by Each Nostril route once daily.    Advance Care Planning     Date: 02/26/2024  Patient did not wish or was not able to name a surrogate decision maker or provide an Advance Care Plan.     Schedule CT lung low dose screening.   Labs reviewed.   Flonase 1 spray each nostril daily.   You can use mucinex over the counter as needed.     Follow up in about 3 months (around 5/26/2024).      Pinky Reyes MD  Department of Internal Medicine - [facility]  10:44 AM

## 2024-02-26 NOTE — PATIENT INSTRUCTIONS
Schedule CT lung low dose screening.   Labs reviewed.   Flonase 1 spray each nostril daily.   You can use mucinex over the counter as needed.

## 2024-03-26 ENCOUNTER — PATIENT MESSAGE (OUTPATIENT)
Dept: CARDIOLOGY | Facility: CLINIC | Age: 69
End: 2024-03-26
Payer: MEDICARE

## 2024-03-29 ENCOUNTER — PATIENT MESSAGE (OUTPATIENT)
Dept: PRIMARY CARE CLINIC | Facility: CLINIC | Age: 69
End: 2024-03-29
Payer: MEDICARE

## 2024-04-08 ENCOUNTER — PATIENT MESSAGE (OUTPATIENT)
Dept: PRIMARY CARE CLINIC | Facility: CLINIC | Age: 69
End: 2024-04-08
Payer: MEDICARE

## 2024-04-08 NOTE — TELEPHONE ENCOUNTER
Pt agreeable to coming in. May run a little late as she drops off her granddaughter at [facility] for 7:45. Will add to appt notes as well

## 2024-04-08 NOTE — TELEPHONE ENCOUNTER
Disp Refills Start End MADDISON   fluticasone propionate (FLONASE) 50 mcg/actuation nasal spray 16 g 0 2/26/2024 -- No   Sig - Route: 1 spray (50 mcg total) by Each Nostril route once daily. - Each Nostril   Sent to pharmacy as: fluticasone propionate (FLONASE) 50 mcg/actuation nasal spray   Class: Normal   Order: [id number]   Date/Time Signed: 2/26/2024 12:39       E-Prescribing Status: Receipt confirmed by pharmacy (2/26/2024 12:50 PM CST)

## 2024-04-08 NOTE — TELEPHONE ENCOUNTER
Pt states she has not seen any improvement with her sinus related head pressure or fogginess. She said she has not been the same since her hospital stay in may of last year. States her headache is interfering with her daily life. She would like your recommendation on where to go from here to get back to normal. Eval needed?

## 2024-04-09 ENCOUNTER — LAB VISIT (OUTPATIENT)
Dept: LAB | Facility: HOSPITAL | Age: 69
End: 2024-04-09
Attending: INTERNAL MEDICINE
Payer: MEDICARE

## 2024-04-09 ENCOUNTER — OFFICE VISIT (OUTPATIENT)
Dept: PRIMARY CARE CLINIC | Facility: CLINIC | Age: 69
End: 2024-04-09
Payer: MEDICARE

## 2024-04-09 VITALS
OXYGEN SATURATION: 98 % | HEIGHT: 69 IN | BODY MASS INDEX: 24.13 KG/M2 | WEIGHT: 162.94 LBS | HEART RATE: 65 BPM | TEMPERATURE: 99 F | DIASTOLIC BLOOD PRESSURE: 84 MMHG | SYSTOLIC BLOOD PRESSURE: 132 MMHG

## 2024-04-09 DIAGNOSIS — R20.2 PARESTHESIA: ICD-10-CM

## 2024-04-09 DIAGNOSIS — J01.00 SUBACUTE MAXILLARY SINUSITIS: Primary | ICD-10-CM

## 2024-04-09 LAB
FOLATE SERPL-MCNC: 2.2 NG/ML (ref 4–24)
VIT B12 SERPL-MCNC: 251 PG/ML (ref 210–950)

## 2024-04-09 PROCEDURE — 82607 VITAMIN B-12: CPT | Performed by: INTERNAL MEDICINE

## 2024-04-09 PROCEDURE — 84165 PROTEIN E-PHORESIS SERUM: CPT | Performed by: INTERNAL MEDICINE

## 2024-04-09 PROCEDURE — 99999 PR PBB SHADOW E&M-EST. PATIENT-LVL V: CPT | Mod: PBBFAC,,, | Performed by: INTERNAL MEDICINE

## 2024-04-09 PROCEDURE — 82746 ASSAY OF FOLIC ACID SERUM: CPT | Performed by: INTERNAL MEDICINE

## 2024-04-09 PROCEDURE — 99213 OFFICE O/P EST LOW 20 MIN: CPT | Mod: S$PBB,,, | Performed by: INTERNAL MEDICINE

## 2024-04-09 PROCEDURE — 84425 ASSAY OF VITAMIN B-1: CPT | Performed by: INTERNAL MEDICINE

## 2024-04-09 PROCEDURE — 36415 COLL VENOUS BLD VENIPUNCTURE: CPT | Mod: PN | Performed by: INTERNAL MEDICINE

## 2024-04-09 PROCEDURE — 99215 OFFICE O/P EST HI 40 MIN: CPT | Mod: PBBFAC,PN | Performed by: INTERNAL MEDICINE

## 2024-04-09 PROCEDURE — 83521 IG LIGHT CHAINS FREE EACH: CPT | Mod: 59 | Performed by: INTERNAL MEDICINE

## 2024-04-09 PROCEDURE — 84165 PROTEIN E-PHORESIS SERUM: CPT | Mod: 26,,, | Performed by: PATHOLOGY

## 2024-04-09 RX ORDER — METHYLPREDNISOLONE 4 MG/1
TABLET ORAL
Qty: 21 EACH | Refills: 0 | Status: SHIPPED | OUTPATIENT
Start: 2024-04-09 | End: 2024-04-30

## 2024-04-09 RX ORDER — AMOXICILLIN AND CLAVULANATE POTASSIUM 875; 125 MG/1; MG/1
1 TABLET, FILM COATED ORAL 2 TIMES DAILY
Qty: 20 TABLET | Refills: 0 | Status: SHIPPED | OUTPATIENT
Start: 2024-04-09 | End: 2024-04-19

## 2024-04-09 NOTE — PROGRESS NOTES
"Subjective:       Patient ID: Karina Thomas is a 68 y.o. female.    Chief Complaint: Sinusitis    HPI  About 2 weeks ago, started w/ sinus pressure and headache. Did flonase for weeks and didn't help. Tried claritin and took that over a week and doesn't think it helps. When she first wakes up in the morning, she feels fine. Within an hour, would get pressure in the sinuses and also the back of the head. Does cause some dizziness - feels swimmy. No fevers/chills. No ear pain. Sometimes when she's blowing her nose, popping in the ears. No swollen glands.     Reports below the knees and feet just feel cold and sometimes tingly. Started a few months ago. Both legs are equal. No falls or weakness. No back pain. Hasn't progressed. Does not feel it in the fingertips. No pain in the legs.   THS 2/26/24 WNL. Not anemic on labs 2/26/24. A1c a yr ago was 4.8. glucose on labs in Feb was wnl.     Review of Systems   Constitutional:  Negative for activity change.   HENT:  Negative for hearing loss and trouble swallowing.    Eyes:  Negative for discharge and visual disturbance.   Respiratory:  Negative for chest tightness and wheezing.    Cardiovascular:  Positive for chest pain (feels like a pressure in the epigastric region; intermittent; assoc w/ afib. hasn't had this in a while.). Negative for palpitations.   Gastrointestinal:  Positive for constipation. Negative for diarrhea and vomiting.   Genitourinary:  Negative for difficulty urinating and hematuria.   Neurological:  Positive for headaches.   Hematological:  Negative for adenopathy. Does not bruise/bleed easily.   Psychiatric/Behavioral:  Positive for dysphoric mood.          Objective:      Physical Exam    /84 (BP Location: Right arm, Patient Position: Sitting, BP Method: Large (Manual))   Pulse 65   Temp 98.5 °F (36.9 °C) (Oral)   Ht 5' 9" (1.753 m)   Wt 73.9 kg (162 lb 14.7 oz)   SpO2 98%   BMI 24.06 kg/m²     GEN - A+OX4, NAD   HEENT - PERRL, EOMI, OP " erythema. No sinus tenderness to palpation.   Neck - No cervical spine or paraspinal tenderness to palpation.   CV - RRR  Chest - CTAB, no wheezing or rhonchi  Abd - S/NT/ND/+BS.   Ext - 2+BDP and radial pulses. No LE edema.   Neuro - 5/5 BUE and BLE strength. 2+ DTRs.   MSK - no spinal tenderness to palpation. Normal gait.  Skin - No rash.    Labs reviewed    Assessment/Plan     Karina was seen today for sinusitis.    Diagnoses and all orders for this visit:    Subacute maxillary sinusitis  -     amoxicillin-clavulanate 875-125mg (AUGMENTIN) 875-125 mg per tablet; Take 1 tablet by mouth 2 (two) times daily. for 10 days  -     methylPREDNISolone (MEDROL DOSEPACK) 4 mg tablet; use as directed    Paresthesia  -     Vitamin B12; Future  -     VITAMIN B1; Future  -     Folate; Future  -     PROTEIN ELECTROPHORESIS, SERUM; Future  -     IMMUNOGLOBULIN FREE LT CHAINS BLOOD; Future      Follow up if symptoms worsen or fail to improve.      Pinky Reyes MD  Department of Internal Medicine - [facility]  8:08 AM

## 2024-04-10 ENCOUNTER — TELEPHONE (OUTPATIENT)
Dept: PRIMARY CARE CLINIC | Facility: CLINIC | Age: 69
End: 2024-04-10
Payer: MEDICARE

## 2024-04-10 LAB
ALBUMIN SERPL ELPH-MCNC: 4.37 G/DL (ref 3.35–5.55)
ALPHA1 GLOB SERPL ELPH-MCNC: 0.27 G/DL (ref 0.17–0.41)
ALPHA2 GLOB SERPL ELPH-MCNC: 0.65 G/DL (ref 0.43–0.99)
B-GLOBULIN SERPL ELPH-MCNC: 0.7 G/DL (ref 0.5–1.1)
GAMMA GLOB SERPL ELPH-MCNC: 0.82 G/DL (ref 0.67–1.58)
KAPPA LC SER QL IA: 1.35 MG/DL (ref 0.33–1.94)
KAPPA LC/LAMBDA SER IA: 0.98 (ref 0.26–1.65)
LAMBDA LC SER QL IA: 1.38 MG/DL (ref 0.57–2.63)
PATHOLOGIST INTERPRETATION SPE: NORMAL
PROT SERPL-MCNC: 6.8 G/DL (ref 6–8.4)

## 2024-04-10 NOTE — TELEPHONE ENCOUNTER
----- Message from Pinky Reyes MD sent at 4/9/2024  6:45 PM CDT -----  Regarding: FW:  Her B12 and folate levels are both low, which can cause numbness and tingling in her legs. I recommend taking a B complex every day.  ----- Message -----  From: [facility]  Sent: 4/9/2024   4:01 PM CDT  To: Pinky Reyes MD

## 2024-04-15 LAB — VIT B1 BLD-MCNC: 59 UG/L (ref 38–122)

## 2024-04-22 DIAGNOSIS — F41.9 ANXIETY: ICD-10-CM

## 2024-04-23 ENCOUNTER — PATIENT MESSAGE (OUTPATIENT)
Dept: PRIMARY CARE CLINIC | Facility: CLINIC | Age: 69
End: 2024-04-23
Payer: MEDICARE

## 2024-04-23 DIAGNOSIS — R20.0 RIGHT SIDED NUMBNESS: Primary | ICD-10-CM

## 2024-04-23 RX ORDER — LORAZEPAM 0.5 MG/1
0.5 TABLET ORAL DAILY PRN
Qty: 30 TABLET | Refills: 0 | Status: SHIPPED | OUTPATIENT
Start: 2024-04-23 | End: 2024-05-31 | Stop reason: SDUPTHER

## 2024-04-24 NOTE — TELEPHONE ENCOUNTER
Ordered CT of head - fairly quick and is bigger than an MRI - she's had a CT of abdomen before. Please let pt know. Referred to neurology also.

## 2024-04-29 ENCOUNTER — OFFICE VISIT (OUTPATIENT)
Dept: PRIMARY CARE CLINIC | Facility: CLINIC | Age: 69
End: 2024-04-29
Payer: MEDICARE

## 2024-04-29 DIAGNOSIS — R20.2 PARESTHESIA: Primary | ICD-10-CM

## 2024-04-29 PROCEDURE — 99211 OFF/OP EST MAY X REQ PHY/QHP: CPT | Mod: 95,,, | Performed by: INTERNAL MEDICINE

## 2024-04-30 NOTE — PROGRESS NOTES
The patient location is: [place], [place]  The chief complaint leading to consultation is: CT scan    Visit type: audiovisual    Face to Face time with patient: 5 min  10 minutes of total time spent on the encounter, which includes face to face time and non-face to face time preparing to see the patient (eg, review of tests), Obtaining and/or reviewing separately obtained history, Documenting clinical information in the electronic or other health record, Independently interpreting results (not separately reported) and communicating results to the patient/family/caregiver, or Care coordination (not separately reported).         Each patient to whom he or she provides medical services by telemedicine is:  (1) informed of the relationship between the physician and patient and the respective role of any other health care provider with respect to management of the patient; and (2) notified that he or she may decline to receive medical services by telemedicine and may withdraw from such care at any time.    Notes:     Subjective:       Patient ID: Karina Thomas is a 69 y.o. female.    HPI  Recent office visit w/ me. Report months of intermittent R sided numbness/tingling feeling. Saw Dr. Retana about it in Nov. Ordered MRI of the brain. Pt couldn't tolerate MRI due to claustrophobia. Insurance does not cover open MRI of brain and pt doesn't think can tolerate even open MRI. Ordered CT W/W/O con. Pt was nervous that she would not be able to tolerate this either so wanted to discuss in clinic visit.     Review of Systems   Constitutional:  Positive for activity change.   HENT:  Negative for hearing loss and trouble swallowing.    Eyes:  Negative for discharge.   Respiratory:  Negative for chest tightness and wheezing.    Cardiovascular:  Negative for chest pain and palpitations.   Gastrointestinal:  Positive for constipation. Negative for diarrhea and vomiting.   Genitourinary:  Negative for difficulty urinating and  hematuria.   Neurological:  Positive for headaches.   Psychiatric/Behavioral:  Negative for dysphoric mood.          Objective:     Gen - A+OX4, NAD  CHEST - completing full sentences. Normal work of breathing.     Assessment/Plan     Diagnoses and all orders for this visit:    Paresthesia    CT scan procedure explained - different from MRI in terms of duration and machine. Pt will take an ativan she has at home prior to CT scan. Will let me know if she has issues tolerating.       Follow up if symptoms worsen or fail to improve.      Pinky Reyes MD  Department of Internal Medicine - [facility]

## 2024-05-02 ENCOUNTER — HOSPITAL ENCOUNTER (OUTPATIENT)
Dept: RADIOLOGY | Facility: HOSPITAL | Age: 69
Discharge: HOME | End: 2024-05-02
Attending: INTERNAL MEDICINE
Payer: MEDICARE

## 2024-05-02 DIAGNOSIS — R20.0 RIGHT SIDED NUMBNESS: ICD-10-CM

## 2024-05-02 PROCEDURE — 25500020 PHARM REV CODE 255: Performed by: INTERNAL MEDICINE

## 2024-05-02 PROCEDURE — 70470 CT HEAD/BRAIN W/O & W/DYE: CPT | Mod: TC

## 2024-05-02 PROCEDURE — 70470 CT HEAD/BRAIN W/O & W/DYE: CPT | Mod: 26,,, | Performed by: RADIOLOGY

## 2024-05-02 RX ADMIN — IOHEXOL 80 ML: 350 INJECTION, SOLUTION INTRAVENOUS at 10:05

## 2024-05-20 ENCOUNTER — LAB VISIT (OUTPATIENT)
Dept: LAB | Facility: HOSPITAL | Age: 69
End: 2024-05-20
Attending: INTERNAL MEDICINE
Payer: MEDICARE

## 2024-05-20 ENCOUNTER — OFFICE VISIT (OUTPATIENT)
Dept: PRIMARY CARE CLINIC | Facility: CLINIC | Age: 69
End: 2024-05-20
Payer: MEDICARE

## 2024-05-20 VITALS
HEIGHT: 69 IN | TEMPERATURE: 97 F | SYSTOLIC BLOOD PRESSURE: 128 MMHG | BODY MASS INDEX: 24.07 KG/M2 | OXYGEN SATURATION: 97 % | WEIGHT: 162.5 LBS | DIASTOLIC BLOOD PRESSURE: 74 MMHG | HEART RATE: 71 BPM

## 2024-05-20 DIAGNOSIS — F41.1 GAD (GENERALIZED ANXIETY DISORDER): ICD-10-CM

## 2024-05-20 DIAGNOSIS — R20.0 RIGHT SIDED NUMBNESS: Primary | ICD-10-CM

## 2024-05-20 DIAGNOSIS — F17.200 SMOKER: ICD-10-CM

## 2024-05-20 DIAGNOSIS — R06.2 WHEEZING: ICD-10-CM

## 2024-05-20 DIAGNOSIS — E53.8 FOLATE DEFICIENCY: ICD-10-CM

## 2024-05-20 DIAGNOSIS — E53.8 B12 DEFICIENCY: ICD-10-CM

## 2024-05-20 LAB — FOLATE SERPL-MCNC: 14.9 NG/ML (ref 4–24)

## 2024-05-20 PROCEDURE — 99214 OFFICE O/P EST MOD 30 MIN: CPT | Mod: S$PBB,,, | Performed by: INTERNAL MEDICINE

## 2024-05-20 PROCEDURE — 83921 ORGANIC ACID SINGLE QUANT: CPT | Performed by: INTERNAL MEDICINE

## 2024-05-20 PROCEDURE — 36415 COLL VENOUS BLD VENIPUNCTURE: CPT | Mod: PN | Performed by: INTERNAL MEDICINE

## 2024-05-20 PROCEDURE — 82746 ASSAY OF FOLIC ACID SERUM: CPT | Performed by: INTERNAL MEDICINE

## 2024-05-20 PROCEDURE — 99215 OFFICE O/P EST HI 40 MIN: CPT | Mod: PBBFAC,PN | Performed by: INTERNAL MEDICINE

## 2024-05-20 PROCEDURE — 99999 PR PBB SHADOW E&M-EST. PATIENT-LVL V: CPT | Mod: PBBFAC,,, | Performed by: INTERNAL MEDICINE

## 2024-05-20 RX ORDER — ALBUTEROL SULFATE 90 UG/1
2 AEROSOL, METERED RESPIRATORY (INHALATION) EVERY 6 HOURS PRN
Qty: 54 G | Refills: 3 | Status: SHIPPED | OUTPATIENT
Start: 2024-05-20 | End: 2024-08-18

## 2024-05-20 RX ORDER — GABAPENTIN 300 MG/1
300 CAPSULE ORAL NIGHTLY
Qty: 30 CAPSULE | Refills: 2 | Status: SHIPPED | OUTPATIENT
Start: 2024-05-20 | End: 2025-05-20

## 2024-05-20 NOTE — PROGRESS NOTES
Subjective:       Patient ID: Karina Thomas is a 69 y.o. female.    Chief Complaint: numbness    HPI  B12 mildly on the lower side of normal - 251 4/9/24 and folate was low at 2.2. On B complex since then. Hasn't helped w/ the R sided numbness. Too claustrophobic for MRI.   CT head 5/2/24  CT of the head demonstrates normal ventricle size.  No infarction, mass, or hemorrhage is seen.  No periventricular or subcortical lesions are identified.  No midline shift or mass effect.  Following contrast, no abnormal enhancing lesions are seen.  The skull and soft tissue structures are unremarkable.    The numbness on the R side of the face, legs and arms are bothersome.     Bought wallflowers from bath and [employer] and thinks that caused her to wheeze. Also cleaning w/ chemicals also irritates her lungs. Uses albuterol prn - may use it once a week at most.         5/20/2024     9:36 AM 5/20/2024     9:35 AM 4/9/2024     8:26 AM 5/31/2023     1:23 PM 5/15/2023     9:26 AM 2/3/2023    11:18 AM 3/3/2022    11:15 AM   Depression Patient Health Questionnaire   Over the last two weeks how often have you been bothered by little interest or pleasure in doing things Not at all Not at all Not at all Not at all Not at all Not at all Not at all   Over the last two weeks how often have you been bothered by feeling down, depressed or hopeless Not at all Not at all Not at all Not at all Not at all Not at all Not at all   PHQ-2 Total Score 0 0 0 0 0 0 0   Over the last two weeks how often have you been bothered by trouble falling or staying asleep, or sleeping too much Several days Several days        Over the last two weeks how often have you been bothered by feeling tired or having little energy Several days Several days        Over the last two weeks how often have you been bothered by a poor appetite or overeating Not at all Not at all        Over the last two weeks how often have you been bothered by feeling bad about yourself - or  that you are a failure or have let yourself or your family down Not at all Not at all        Over the last two weeks how often have you been bothered by trouble concentrating on things, such as reading the newspaper or watching television Not at all Not at all        Over the last two weeks how often have you been bothered by moving or speaking so slowly that other people could have noticed. Or the opposite - being so fidgety or restless that you have been moving around a lot more than usual.  Not at all        Over the last two weeks how often have you been bothered by thoughts that you would be better off dead, or of hurting yourself Not at all Not at all        If you checked off any problems, how difficult have these problems made it for you to do your work, take care of things at home or get along with other people? Somewhat difficult Somewhat difficult        PHQ-9 Score  2        PHQ-9 Interpretation  Minimal or None                5/20/2024     9:36 AM   GAD7   1. Feeling nervous, anxious, or on edge? 1   2. Not being able to stop or control worrying? 0   3. Worrying too much about different things? 0   4. Trouble relaxing? 1   5. Being so restless that it is hard to sit still? 0   6. Becoming easily annoyed or irritable? 0   7. Feeling afraid as if something awful might happen? 0   8. If you checked off any problems, how difficult have these problems made it for you to do your work, take care of things at home, or get along with other people? 0   CLYDE-7 Score 2     Takes ativan 0.5mg prn - using intermittently and has been cutting back. Last fill about a mo ago. Has 15 tabs left.     Review of Systems   Constitutional:  Positive for activity change.   HENT:  Negative for hearing loss and trouble swallowing.    Eyes:  Negative for discharge.   Respiratory:  Positive for wheezing. Negative for chest tightness.    Cardiovascular:  Positive for chest pain. Negative for palpitations.   Gastrointestinal:  Negative  "for constipation, diarrhea and vomiting.   Genitourinary:  Negative for difficulty urinating and hematuria.   Neurological:  Positive for headaches.   Psychiatric/Behavioral:  Positive for dysphoric mood.          Objective:      Physical Exam    /74 (BP Location: Left arm, Patient Position: Sitting, BP Method: Medium (Automatic))   Pulse 71   Temp 97.1 °F (36.2 °C) (Oral)   Ht 5' 9" (1.753 m)   Wt 73.7 kg (162 lb 7.7 oz)   SpO2 97%   BMI 23.99 kg/m²     GEN - A+OX4, NAD   HEENT - PERRL, EOMI, OP clear. MMM.   Neck - No thyromegaly or cervical LAD. No thyroid masses felt.  CV - RRR, no m/r   Chest - CTAB, no wheezing or rhonchi  Abd - S/NT/ND/+BS.   Ext - 2+BDP and radial pulses. No C/C/E.  Neuro - 5/5 BUE and BLE strength. NORMAL GAIT.   Skin - No rash.    Previous labs reviewed.    Assessment/Plan     Diagnoses and all orders for this visit:    Right sided numbness - keep appt w/ neurology. Trial of gabapentin nightly.  -     EMG W/ ULTRASOUND AND NERVE CONDUCTION TEST 2 Extremities; Future  -     gabapentin (NEURONTIN) 300 MG capsule; Take 1 capsule (300 mg total) by mouth every evening.    Wheezing  -     albuterol (PROVENTIL/VENTOLIN HFA) 90 mcg/actuation inhaler; Inhale 2 puffs into the lungs every 6 (six) hours as needed for Wheezing. Rescue    Folate deficiency  -     Folate; Future    B12 deficiency  -     Vitamin B12 Deficiency Panel; Future    Smoker - schedule CT lung CA screening - ordered 9/2023.     CLYDE (generalized anxiety disorder) - does not need med daily. Taking ativan sparingly.    Reports will bring back ACP.    Follow up in about 2 months (around 7/20/2024).      Pinky Reyes MD  Department of Internal Medicine - [facility]  9:44 AM    "

## 2024-05-21 LAB — VIT B12 SERPL-MCNC: 286 NG/L (ref 180–914)

## 2024-05-22 ENCOUNTER — PATIENT MESSAGE (OUTPATIENT)
Dept: PRIMARY CARE CLINIC | Facility: CLINIC | Age: 69
End: 2024-05-22
Payer: MEDICARE

## 2024-05-22 LAB — METHYLMALONATE SERPL-SCNC: 0.13 NMOL/ML

## 2024-05-31 DIAGNOSIS — F41.9 ANXIETY: ICD-10-CM

## 2024-06-02 ENCOUNTER — ON-DEMAND VIRTUAL (OUTPATIENT)
Dept: URGENT CARE | Facility: CLINIC | Age: 69
End: 2024-06-02
Payer: MEDICARE

## 2024-06-02 DIAGNOSIS — R39.9 UTI SYMPTOMS: Primary | ICD-10-CM

## 2024-06-02 PROCEDURE — 99213 OFFICE O/P EST LOW 20 MIN: CPT | Mod: 95,,, | Performed by: NURSE PRACTITIONER

## 2024-06-02 RX ORDER — CEPHALEXIN 500 MG/1
500 CAPSULE ORAL EVERY 12 HOURS
Qty: 14 CAPSULE | Refills: 0 | Status: SHIPPED | OUTPATIENT
Start: 2024-06-02 | End: 2024-06-09

## 2024-06-02 NOTE — PROGRESS NOTES
Subjective:      Patient ID: Karina Thomas is a 69 y.o. female.    Vitals:  vitals were not taken for this visit.     Chief Complaint: Dysuria      Visit Type: TELE AUDIOVISUAL    Present with the patient at the time of consultation: TELEMED PRESENT WITH PATIENT: None        Past Medical History:   Diagnosis Date    Anxiety     GERD (gastroesophageal reflux disease)     Hyperlipidemia      Past Surgical History:   Procedure Laterality Date    APPENDECTOMY      HYSTERECTOMY  1989    TVH ov in situ - due to abnormal Pap smear     Review of patient's allergies indicates:  No Known Allergies  Current Outpatient Medications on File Prior to Visit   Medication Sig Dispense Refill    acetaminophen (TYLENOL) 500 MG tablet Take 1,000 mg by mouth every 8 (eight) hours as needed for Pain. (First choice)      albuterol (PROVENTIL/VENTOLIN HFA) 90 mcg/actuation inhaler Inhale 2 puffs into the lungs every 6 (six) hours as needed for Wheezing. Rescue 54 g 3    amLODIPine (NORVASC) 10 MG tablet Take 1 tablet (10 mg total) by mouth once daily. 90 tablet 3    apixaban (ELIQUIS) 5 mg Tab Take 1 tablet (5 mg total) by mouth 2 (two) times daily. 180 tablet 3    ascorbic acid (VITAMIN C ORAL) Take 1 tablet by mouth once daily.      calcium carbonate (TUMS) 200 mg calcium (500 mg) chewable tablet Take 2 tablets by mouth every 8 (eight) hours as needed for Heartburn.      ergocalciferol, vitamin D2, (VITAMIN D ORAL) Take 1 tablet by mouth once daily.      esomeprazole (NEXIUM) 40 MG capsule Take 1 capsule (40 mg total) by mouth once daily. 90 capsule 3    fluticasone propionate (FLONASE) 50 mcg/actuation nasal spray 1 spray (50 mcg total) by Each Nostril route once daily. 16 g 0    gabapentin (NEURONTIN) 300 MG capsule Take 1 capsule (300 mg total) by mouth every evening. 30 capsule 2    ibuprofen (ADVIL,MOTRIN) 200 MG tablet Take 400-600 mg by mouth every 8 (eight) hours as needed for Pain. (Second choice)      L. rhamnosus GG/inulin  (CULTURELLE PROBIOTICS ORAL) Take 1 capsule by mouth once daily.      LORazepam (ATIVAN) 0.5 MG tablet Take 1 tablet (0.5 mg total) by mouth daily as needed for Anxiety. 30 tablet 0    magnesium hydroxide 400 mg/5 ml (MILK OF MAGNESIA) 400 mg/5 mL Susp Take 30 mLs by mouth daily as needed (constipation).      metoprolol tartrate (LOPRESSOR) 50 MG tablet Take 1 tablet (50 mg total) by mouth 2 (two) times daily. 180 tablet 3    omega-3 acid ethyl esters (LOVAZA) 1 gram capsule       rosuvastatin (CRESTOR) 20 MG tablet Take 1 tablet (20 mg total) by mouth once daily. 90 tablet 3    tacrolimus (PROTOPIC) 0.1 % ointment  (Patient not taking: Reported on 5/20/2024)      triamcinolone acetonide 0.1% (KENALOG) 0.1 % cream APPLY TO ARM TWO TO FOUR TIMES A DAY AS NEEDED FOR RASH (Patient not taking: Reported on 5/20/2024)      ubidecarenone/vitamin E mixed (COQ10  ORAL) Take 1 tablet by mouth once daily.       No current facility-administered medications on file prior to visit.     Family History   Problem Relation Name Age of Onset    Cancer Father          unknown CA    Alzheimer's disease Mother      Heart disease Brother          cad s/p stent 64    No Known Problems Daughter      No Known Problems Son      Breast cancer Neg Hx      Colon cancer Neg Hx      Ovarian cancer Neg Hx         Medications Ordered                [id number] - [place]  [address] AT [facility] [place] [address] [id number]    Telephone: [phone]   Fax: [phone]   Hours: Not open 24 hours                         E-Prescribed (1 of 1)              cephALEXin (KEFLEX) 500 MG capsule    Sig: Take 1 capsule (500 mg total) by mouth every 12 (twelve) hours. for 7 days       Start: 6/2/24     Quantity: 14 capsule Refills: 0                           Ohs Peq Odvv Intake    6/2/2024 10:58 AM CDT - Filed by Patient   What is your current physical address in the event of a medical emergency?  [address] [place] [address]   Are you able to take your vital signs? No   Please attach any relevant images or files          70 yo female with c/o uti symptoms. She states she woke up this morning with burning. She is taking demanos without relief. She states she has hx of uti. She denies hematuria. She denies back pain and abdominal pain. She denies discharge.         Constitution: Negative. Negative for fever.   HENT: Negative.     Neck: neck negative.   Cardiovascular: Negative.    Respiratory: Negative.     Gastrointestinal: Negative.  Negative for abdominal pain, nausea and vomiting.   Endocrine: negative.   Genitourinary:  Positive for dysuria, frequency and urgency. Negative for vaginal discharge, vaginal odor and genital sore.   Musculoskeletal: Negative.  Negative for back pain.   Skin: Negative.    Neurological: Negative.         Objective:   The physical exam was conducted virtually.  LOCATION OF PATIENT home  Physical Exam   Constitutional: She is oriented to person, place, and time. She appears well-developed.   HENT:   Head: Normocephalic and atraumatic.   Ears:   Right Ear: Hearing, tympanic membrane and external ear normal.   Left Ear: Hearing, tympanic membrane and external ear normal.   Nose: Nose normal.   Mouth/Throat: Uvula is midline, oropharynx is clear and moist and mucous membranes are normal.   Eyes: Conjunctivae and EOM are normal. Pupils are equal, round, and reactive to light.   Neck: Neck supple.   Cardiovascular: Normal rate.   Pulmonary/Chest: Effort normal and breath sounds normal.   Musculoskeletal: Normal range of motion.         General: Normal range of motion.   Neurological: She is alert and oriented to person, place, and time.   Skin: Skin is warm.   Psychiatric: Her behavior is normal. Thought content normal.   Nursing note and vitals reviewed.      Assessment:     1. UTI symptoms        Plan:     PLEASE READ YOUR DISCHARGE INSTRUCTIONS ENTIRELY AS IT CONTAINS IMPORTANT  INFORMATION.      Take the antibiotics to completion.     Drink plenty of fluids, wipe front to back, take showers not baths, no scented soaps, wear breathable cotton underwear, urinate after sexual intercourse.     Please go to the ER for worsening symptoms including fever, worsening flank pain, vomiting, etc.       Please return or see your primary care doctor if you develop new or worsening symptoms.     Please arrange follow up with your primary medical clinic as soon as possible. You must understand that you've received an Urgent Care treatment only and that you may be released before all of your medical problems are known or treated. You, the patient, will arrange for follow up as instructed. If your symptoms worsen or fail to improve you should go to the Emergency Room.  WE CANNOT RULE OUT ALL POSSIBLE CAUSES OF YOUR SYMPTOMS IN THE URGENT CARE SETTING PLEASE GO TO THE ER IF YOU FEELS YOUR CONDITION IS WORSENING OR YOU WOULD LIKE EMERGENT EVALUATION.    UTI symptoms  -     cephALEXin (KEFLEX) 500 MG capsule; Take 1 capsule (500 mg total) by mouth every 12 (twelve) hours. for 7 days  Dispense: 14 capsule; Refill: 0

## 2024-06-03 DIAGNOSIS — I10 HYPERTENSION, UNSPECIFIED TYPE: ICD-10-CM

## 2024-06-03 DIAGNOSIS — I48.0 PAROXYSMAL ATRIAL FIBRILLATION: ICD-10-CM

## 2024-06-03 DIAGNOSIS — E78.5 HYPERLIPIDEMIA, UNSPECIFIED HYPERLIPIDEMIA TYPE: ICD-10-CM

## 2024-06-03 RX ORDER — ROSUVASTATIN CALCIUM 20 MG/1
20 TABLET, COATED ORAL DAILY
Qty: 90 TABLET | Refills: 3 | Status: SHIPPED | OUTPATIENT
Start: 2024-06-03

## 2024-06-03 RX ORDER — LORAZEPAM 0.5 MG/1
0.5 TABLET ORAL DAILY PRN
Qty: 30 TABLET | Refills: 0 | Status: SHIPPED | OUTPATIENT
Start: 2024-06-03

## 2024-06-04 ENCOUNTER — PATIENT MESSAGE (OUTPATIENT)
Dept: PRIMARY CARE CLINIC | Facility: CLINIC | Age: 69
End: 2024-06-04
Payer: MEDICARE

## 2024-06-17 ENCOUNTER — HOSPITAL ENCOUNTER (OUTPATIENT)
Dept: RADIOLOGY | Facility: HOSPITAL | Age: 69
Discharge: HOME | End: 2024-06-17
Attending: INTERNAL MEDICINE
Payer: MEDICARE

## 2024-06-17 DIAGNOSIS — Z87.891 PERSONAL HISTORY OF NICOTINE DEPENDENCE: ICD-10-CM

## 2024-06-17 DIAGNOSIS — R91.1 LUNG NODULE: ICD-10-CM

## 2024-06-17 DIAGNOSIS — Z12.2 ENCOUNTER FOR SCREENING FOR LUNG CANCER: ICD-10-CM

## 2024-06-17 DIAGNOSIS — F17.200 SMOKER: ICD-10-CM

## 2024-06-17 PROCEDURE — 71271 CT THORAX LUNG CANCER SCR C-: CPT | Mod: 26,,, | Performed by: STUDENT IN AN ORGANIZED HEALTH CARE EDUCATION/TRAINING PROGRAM

## 2024-06-17 PROCEDURE — 71271 CT THORAX LUNG CANCER SCR C-: CPT | Mod: TC

## 2024-06-18 ENCOUNTER — PATIENT MESSAGE (OUTPATIENT)
Dept: PRIMARY CARE CLINIC | Facility: CLINIC | Age: 69
End: 2024-06-18
Payer: MEDICARE

## 2024-06-19 ENCOUNTER — PATIENT MESSAGE (OUTPATIENT)
Dept: PRIMARY CARE CLINIC | Facility: CLINIC | Age: 69
End: 2024-06-19
Payer: MEDICARE

## 2024-06-19 NOTE — TELEPHONE ENCOUNTER
Please advise.      Benzoyl Peroxide Counseling: Patient counseled that medicine may cause skin irritation and bleach clothing.  In the event of skin irritation, the patient was advised to reduce the amount of the drug applied or use it less frequently.   The patient verbalized understanding of the proper use and possible adverse effects of benzoyl peroxide.  All of the patient's questions and concerns were addressed.

## 2024-06-28 ENCOUNTER — PATIENT MESSAGE (OUTPATIENT)
Dept: PRIMARY CARE CLINIC | Facility: CLINIC | Age: 69
End: 2024-06-28
Payer: MEDICARE

## 2024-06-28 DIAGNOSIS — R30.0 DYSURIA: Primary | ICD-10-CM

## 2024-06-28 RX ORDER — CIPROFLOXACIN 500 MG/1
500 TABLET ORAL 2 TIMES DAILY
Qty: 14 TABLET | Refills: 0 | Status: SHIPPED | OUTPATIENT
Start: 2024-06-28 | End: 2024-07-05

## 2024-07-03 ENCOUNTER — TELEPHONE (OUTPATIENT)
Dept: PRIMARY CARE CLINIC | Facility: CLINIC | Age: 69
End: 2024-07-03
Payer: MEDICARE

## 2024-07-19 ENCOUNTER — OFFICE VISIT (OUTPATIENT)
Dept: PRIMARY CARE CLINIC | Facility: CLINIC | Age: 69
End: 2024-07-19
Payer: MEDICARE

## 2024-07-19 VITALS
TEMPERATURE: 99 F | SYSTOLIC BLOOD PRESSURE: 110 MMHG | WEIGHT: 164.13 LBS | BODY MASS INDEX: 24.24 KG/M2 | DIASTOLIC BLOOD PRESSURE: 76 MMHG | OXYGEN SATURATION: 95 % | HEART RATE: 71 BPM

## 2024-07-19 DIAGNOSIS — F41.9 ANXIETY: ICD-10-CM

## 2024-07-19 DIAGNOSIS — E53.8 B12 DEFICIENCY: ICD-10-CM

## 2024-07-19 DIAGNOSIS — R20.0 NUMBNESS: ICD-10-CM

## 2024-07-19 DIAGNOSIS — N39.0 URINARY TRACT INFECTION WITHOUT HEMATURIA, SITE UNSPECIFIED: ICD-10-CM

## 2024-07-19 DIAGNOSIS — F41.1 GAD (GENERALIZED ANXIETY DISORDER): Primary | ICD-10-CM

## 2024-07-19 PROCEDURE — 99999 PR PBB SHADOW E&M-EST. PATIENT-LVL III: CPT | Mod: PBBFAC,,, | Performed by: INTERNAL MEDICINE

## 2024-07-19 PROCEDURE — 99214 OFFICE O/P EST MOD 30 MIN: CPT | Mod: S$PBB,,, | Performed by: INTERNAL MEDICINE

## 2024-07-19 PROCEDURE — 99213 OFFICE O/P EST LOW 20 MIN: CPT | Mod: PBBFAC,PN | Performed by: INTERNAL MEDICINE

## 2024-07-19 PROCEDURE — G2211 COMPLEX E/M VISIT ADD ON: HCPCS | Mod: S$PBB,,, | Performed by: INTERNAL MEDICINE

## 2024-07-19 RX ORDER — AMLODIPINE BESYLATE 10 MG/1
10 TABLET ORAL DAILY
Qty: 90 TABLET | Refills: 3 | Status: SHIPPED | OUTPATIENT
Start: 2024-07-19

## 2024-07-19 RX ORDER — CEPHALEXIN 500 MG/1
500 CAPSULE ORAL EVERY 8 HOURS
Qty: 21 CAPSULE | Refills: 0 | Status: SHIPPED | OUTPATIENT
Start: 2024-07-19 | End: 2024-07-26

## 2024-07-19 RX ORDER — LORAZEPAM 0.5 MG/1
0.5 TABLET ORAL DAILY PRN
Qty: 30 TABLET | Refills: 3 | Status: SHIPPED | OUTPATIENT
Start: 2024-07-19

## 2024-07-19 RX ORDER — BUSPIRONE HYDROCHLORIDE 5 MG/1
5 TABLET ORAL 2 TIMES DAILY
Qty: 60 TABLET | Refills: 11 | Status: SHIPPED | OUTPATIENT
Start: 2024-07-19 | End: 2025-07-19

## 2024-07-19 NOTE — PROGRESS NOTES
Subjective:       Patient ID: Karina Thomas is a 69 y.o. female.    Chief Complaint: Numbness    HPI  Numbness is the same. Clarifying her symptoms again (previously said numbness was on the R arm and leg) - says numbness is on the R arm and in the legs is below knees B.   Started on B complex due to folate def and b12 was on the lower side of normal.   Tried gabapentin and it caused too much dizziness.   Some sinus pressure. Takes claritin and that helps.     Anxiety - lorazepam 0.5mg - tries to cut back.    w/ last refill 6/3/24 #30   Sometimes w/ HA that starts in the neck and down the shoulders.   Open to trying medicines like buspar but not necessarily SSRIs due to weight. Understands dependence and tolerance and doesn't want to develop either one.     Review of Systems  Comprehensive review of systems otherwise negative. See history/subjective section for more details.    Objective:      Physical Exam    /76 (BP Location: Right arm, Patient Position: Sitting, BP Method: Medium (Manual))   Pulse 71   Temp 98.6 °F (37 °C) (Oral)   Wt 74.5 kg (164 lb 2.1 oz)   SpO2 95%   BMI 24.24 kg/m²     GEN - A+OX4, NAD   HEENT - PERRL, EOMI, OP clear. MMM.   Neck - No thyromegaly or cervical LAD. No thyroid masses felt.  CV - RRR, no m/r   Chest - CTAB, no wheezing or rhonchi  Abd - S/NT/ND/+BS.   Ext - 2+BDP and radial pulses. No C/C/E.  Neuro - 5/5 BUE and BLE strength. NORMAL GAIT.   Skin - No rash.     Previous labs reviewed.    Assessment/Plan     Karina was seen today for numbness.    Diagnoses and all orders for this visit:    CLYDE (generalized anxiety disorder) - trial of buspar BID prn. Limit lorazepam use if possible.   -     busPIRone (BUSPAR) 5 MG Tab; Take 1 tablet (5 mg total) by mouth 2 (two) times daily.  -     Comprehensive Metabolic Panel; Future  -     CBC Auto Differential; Future    Numbness - chronic. EMG scheduled in Sept and neuro appt in Nov.     Urinary tract infection without hematuria,  site unspecified  -     Urinalysis; Future  -     Urinalysis Microscopic; Future  -     Urine culture; Future    B12 deficiency  -     CBC Auto Differential; Future  -     Vitamin B12; Future  -     INTRINSIC FACTOR BLOCKING AB; Future    Anxiety - Discussed risks and benefits of controlled substance, including potential for tolerance and dependence, w/ pt. Reports current regimen and dosage controls the symptoms. Defers decreasing dosage. Agrees that benefits outweigh risks at this time. Will reassess at next visit.   -     LORazepam (ATIVAN) 0.5 MG tablet; Take 1 tablet (0.5 mg total) by mouth daily as needed for Anxiety.    Other orders  -     cephALEXin (KEFLEX) 500 MG capsule; Take 1 capsule (500 mg total) by mouth every 8 (eight) hours. for 7 days        Follow up in about 3 months (around 10/19/2024).      Pinky Reyes MD  Department of [facility] Medicine - [facility]  9:49 AM

## 2024-07-24 ENCOUNTER — PATIENT MESSAGE (OUTPATIENT)
Dept: PRIMARY CARE CLINIC | Facility: CLINIC | Age: 69
End: 2024-07-24
Payer: MEDICARE

## 2024-08-14 ENCOUNTER — PATIENT MESSAGE (OUTPATIENT)
Dept: PRIMARY CARE CLINIC | Facility: CLINIC | Age: 69
End: 2024-08-14
Payer: MEDICARE

## 2024-08-15 ENCOUNTER — OFFICE VISIT (OUTPATIENT)
Dept: PRIMARY CARE CLINIC | Facility: CLINIC | Age: 69
End: 2024-08-15
Payer: MEDICARE

## 2024-08-15 VITALS
TEMPERATURE: 98 F | SYSTOLIC BLOOD PRESSURE: 130 MMHG | DIASTOLIC BLOOD PRESSURE: 74 MMHG | HEIGHT: 69 IN | BODY MASS INDEX: 24 KG/M2 | HEART RATE: 78 BPM | WEIGHT: 162.06 LBS | OXYGEN SATURATION: 95 % | RESPIRATION RATE: 16 BRPM

## 2024-08-15 DIAGNOSIS — G89.29 CHRONIC BILATERAL BACK PAIN, UNSPECIFIED BACK LOCATION: ICD-10-CM

## 2024-08-15 DIAGNOSIS — J31.0 CHRONIC RHINITIS: ICD-10-CM

## 2024-08-15 DIAGNOSIS — M54.9 CHRONIC BILATERAL BACK PAIN, UNSPECIFIED BACK LOCATION: ICD-10-CM

## 2024-08-15 DIAGNOSIS — F41.1 GAD (GENERALIZED ANXIETY DISORDER): Primary | ICD-10-CM

## 2024-08-15 PROCEDURE — 99999 PR PBB SHADOW E&M-EST. PATIENT-LVL V: CPT | Mod: PBBFAC,,, | Performed by: PHYSICIAN ASSISTANT

## 2024-08-15 PROCEDURE — 99215 OFFICE O/P EST HI 40 MIN: CPT | Mod: PBBFAC,PN | Performed by: PHYSICIAN ASSISTANT

## 2024-08-15 PROCEDURE — 99214 OFFICE O/P EST MOD 30 MIN: CPT | Mod: S$PBB,,, | Performed by: PHYSICIAN ASSISTANT

## 2024-08-15 RX ORDER — SERTRALINE HYDROCHLORIDE 25 MG/1
25 TABLET, FILM COATED ORAL DAILY
Qty: 30 TABLET | Refills: 0 | Status: SHIPPED | OUTPATIENT
Start: 2024-08-15 | End: 2025-08-15

## 2024-08-15 RX ORDER — TIZANIDINE 4 MG/1
4 TABLET ORAL NIGHTLY PRN
Qty: 30 TABLET | Refills: 0 | Status: SHIPPED | OUTPATIENT
Start: 2024-08-15 | End: 2024-08-25

## 2024-08-15 NOTE — PROGRESS NOTES
Primary Care Provider Appointment   [place] [address] [address] [facility]        Subjective:       Patient ID:  Karina is a 69 y.o. female being seen for Dizziness, Back Pain, Neck Pain, Arm Pain, and Fatigue      Chief Complaint: Dizziness, Back Pain, Neck Pain, Arm Pain, and Fatigue    HPI: 68 Yo female presents for dizziness, pain and fatigue. Chronic issues. States she wakes up every day feeling pressure in head and sinuses. Takes claritin and saline with some relief. Pain going down her neck and back around her arms. She will sometimes get tingling down her right arm. Her bilateral lower legs are tingly and cold. Takes tylenol with no relief. She has an EMG scheduled for November.  Suffers with anxiety. Trying to wean herself from ativan. Taking it about 2-3xs per week. States feels like she could take it everyday. Tried buspar for anxiety for 1 month, with no change in anxiety, so stopped.     Past Medical History:   Diagnosis Date    Anxiety     GERD (gastroesophageal reflux disease)     Hyperlipidemia        Review of Systems   Constitutional:  Positive for activity change. Negative for unexpected weight change.   HENT:  Negative for hearing loss, rhinorrhea and trouble swallowing.    Eyes:  Positive for visual disturbance. Negative for discharge.   Respiratory:  Positive for wheezing. Negative for chest tightness.    Cardiovascular:  Negative for chest pain and palpitations.   Gastrointestinal:  Positive for constipation. Negative for blood in stool, diarrhea and vomiting.   Endocrine: Negative for polydipsia and polyuria.   Genitourinary:  Negative for difficulty urinating, dysuria, hematuria and menstrual problem.   Musculoskeletal:  Positive for neck pain. Negative for arthralgias and joint swelling.   Neurological:  Positive for weakness. Negative for headaches.   Psychiatric/Behavioral:  Negative for confusion and dysphoric mood.              Health Maintenance         Date Due Completion  "Date    RSV Vaccine (Age 60+ and Pregnant patients) (1 - 1-dose 60+ series) Never done ---    Mammogram 10/10/2024 10/10/2023    TETANUS VACCINE 09/15/2024 (Originally 10/1/2015) 10/1/2005    Shingles Vaccine (2 of 3) 09/15/2024 (Originally 8/10/2016) 6/15/2016    COVID-19 Vaccine (4 - 2023-24 season) 09/15/2024 (Originally 9/1/2023) 10/26/2021    Influenza Vaccine (1) 09/01/2024 10/23/2020    DEXA Scan 06/09/2025 6/9/2020    LDCT Lung Screen 06/17/2025 6/17/2024    Colorectal Cancer Screening 08/12/2025 8/12/2020    Override on 8/12/2020: Done    High Dose Statin 08/15/2025 8/15/2024    Lipid Panel 02/26/2029 2/26/2024                     Objective:      Vitals:    08/15/24 0934   BP: 130/74   BP Location: Right arm   Patient Position: Sitting   BP Method: Small (Manual)   Pulse: 78   Resp: 16   Temp: 97.9 °F (36.6 °C)   SpO2: 95%   Weight: 73.5 kg (162 lb 0.6 oz)   Height: 5' 9" (1.753 m)     Estimated body mass index is 23.93 kg/m² as calculated from the following:    Height as of this encounter: 5' 9" (1.753 m).    Weight as of this encounter: 73.5 kg (162 lb 0.6 oz).  Physical Exam  Constitutional:       Appearance: Normal appearance.   HENT:      Head: Normocephalic and atraumatic.   Cardiovascular:      Rate and Rhythm: Normal rate and regular rhythm.   Pulmonary:      Effort: Pulmonary effort is normal.      Breath sounds: Normal breath sounds. No wheezing.   Musculoskeletal:      Cervical back: Full passive range of motion without pain and normal range of motion. No spinous process tenderness or muscular tenderness.        Back:    Neurological:      Mental Status: She is alert.   Psychiatric:         Mood and Affect: Mood normal.         Thought Content: Thought content normal.           Assessment and Plan:         1. CLYDE (generalized anxiety disorder)  -     sertraline (ZOLOFT) 25 MG tablet; Take 1 tablet (25 mg total) by mouth once daily.  Dispense: 30 tablet; Refill: 0  -     Ambulatory " referral/consult to [facility]; Future; Expected date: 08/22/2024  -     Discussed side effects of medication in. Advised if unable to tolerate side effects to call our clinic. Advised medication takes 4-6 weeks for full effect. At any time after 1 week we can increase dosage if needed. F/u in 4 weeks with me. She is also agreeable to meet with LCSW.      2. Chronic bilateral back pain, unspecified back location  -     tiZANidine (ZANAFLEX) 4 MG tablet; Take 1 tablet (4 mg total) by mouth nightly as needed.  Dispense: 30 tablet; Refill: 0  -     consider therapy in the future    3. Chronic rhinitis  Continue clairitin and saline as they are effective. Can add astelin in future if needed       Follow Up:   F/u in 4 weeks        Amy Lado, PA-C [facility]

## 2024-08-20 ENCOUNTER — PATIENT MESSAGE (OUTPATIENT)
Dept: PRIMARY CARE CLINIC | Facility: CLINIC | Age: 69
End: 2024-08-20
Payer: MEDICARE

## 2024-08-20 DIAGNOSIS — N39.0 RECURRENT UTI: Primary | ICD-10-CM

## 2024-08-31 ENCOUNTER — PATIENT MESSAGE (OUTPATIENT)
Dept: URGENT CARE | Facility: CLINIC | Age: 69
End: 2024-08-31
Payer: MEDICARE

## 2024-08-31 ENCOUNTER — OFFICE VISIT (OUTPATIENT)
Dept: URGENT CARE | Facility: CLINIC | Age: 69
End: 2024-08-31
Payer: MEDICARE

## 2024-08-31 VITALS
BODY MASS INDEX: 24 KG/M2 | SYSTOLIC BLOOD PRESSURE: 105 MMHG | HEART RATE: 69 BPM | HEIGHT: 69 IN | OXYGEN SATURATION: 97 % | WEIGHT: 162.06 LBS | RESPIRATION RATE: 18 BRPM | DIASTOLIC BLOOD PRESSURE: 69 MMHG | TEMPERATURE: 99 F

## 2024-08-31 DIAGNOSIS — R07.89 LEFT-SIDED CHEST WALL PAIN: ICD-10-CM

## 2024-08-31 DIAGNOSIS — R09.89 CHEST CONGESTION: Primary | ICD-10-CM

## 2024-08-31 LAB
CTP QC/QA: YES
SARS-COV-2 AG RESP QL IA.RAPID: NEGATIVE

## 2024-08-31 PROCEDURE — 87811 SARS-COV-2 COVID19 W/OPTIC: CPT | Mod: QW,S$GLB,, | Performed by: FAMILY MEDICINE

## 2024-08-31 PROCEDURE — 99213 OFFICE O/P EST LOW 20 MIN: CPT | Mod: S$GLB,,, | Performed by: FAMILY MEDICINE

## 2024-08-31 PROCEDURE — 71101 X-RAY EXAM UNILAT RIBS/CHEST: CPT | Mod: FY,LT,S$GLB, | Performed by: RADIOLOGY

## 2024-08-31 RX ORDER — FLUNISOLIDE 0.25 MG/ML
2 SOLUTION NASAL DAILY
Qty: 25 ML | Refills: 1 | Status: SHIPPED | OUTPATIENT
Start: 2024-08-31

## 2024-08-31 NOTE — PROGRESS NOTES
"Subjective:      Patient ID: Karina Thomas is a 69 y.o. female.    Vitals:  height is 5' 9" (1.753 m) and weight is 73.5 kg (162 lb 0.6 oz). Her oral temperature is 98.5 °F (36.9 °C). Her blood pressure is 105/69 and her pulse is 69. Her respiration is 18 and oxygen saturation is 97%.     Chief Complaint: Other Misc (Pain in left side   Congestion - Entered by patient) and Nasal Congestion    This is a 69 y.o. female who presents today with a chief complaint of sinus pressure, chest congestion, ear pressure both ears and left side pain that started Thursday. Pt states she hasn't taken any sinus medication to resolve this issue. Taking tylenol for pain.    ROS   Objective:     Physical Exam   Constitutional: She does not appear ill. No distress. normal  HENT:   Head: Normocephalic and atraumatic.   Nose: Congestion present.   Mouth/Throat: Mucous membranes are moist. Posterior oropharyngeal erythema present.   Eyes: Pupils are equal, round, and reactive to light. Extraocular movement intact   Neck: Neck supple.   Cardiovascular: Normal rate, regular rhythm, normal heart sounds and normal pulses.   Pulmonary/Chest: She exhibits tenderness (left lower costal margin).   Abdominal: Normal appearance.   Neurological: She is alert.   Nursing note and vitals reviewed.    Results for orders placed or performed in visit on 08/31/24   SARS Coronavirus 2 Antigen, POCT Manual Read   Result Value Ref Range    SARS Coronavirus 2 Antigen Negative Negative     Acceptable Yes         Assessment:     1. Chest congestion    2. Left-sided chest wall pain      Chest pain is reproducible with deep palpation suspect costochondritis. X-ray is unremarkable. Advised OTC NSAIDs for pain  Plan:       Chest congestion  -     SARS Coronavirus 2 Antigen, POCT Manual Read  -     Cancel: X-Ray Ribs 2 View Left; Future; Expected date: 08/31/2024  -     flunisolide 25 mcg, 0.025%, (NASALIDE) 25 mcg (0.025 %) Spry; 2 sprays by Nasal route " once daily.  Dispense: 25 mL; Refill: 1    Left-sided chest wall pain  -     XR RIB LEFT W/ PA CHEST; Future; Expected date: 08/31/2024      Continue with OTC cough and cold remedies. RTC prn

## 2024-09-02 ENCOUNTER — PATIENT MESSAGE (OUTPATIENT)
Dept: PRIMARY CARE CLINIC | Facility: CLINIC | Age: 69
End: 2024-09-02
Payer: MEDICARE

## 2024-09-02 ENCOUNTER — PATIENT MESSAGE (OUTPATIENT)
Dept: CARDIOLOGY | Facility: CLINIC | Age: 69
End: 2024-09-02
Payer: MEDICARE

## 2024-09-02 DIAGNOSIS — I48.0 PAROXYSMAL ATRIAL FIBRILLATION: Primary | ICD-10-CM

## 2024-09-03 RX ORDER — METHYLPREDNISOLONE 4 MG/1
TABLET ORAL
Qty: 21 EACH | Refills: 0 | Status: SHIPPED | OUTPATIENT
Start: 2024-09-03 | End: 2024-09-24

## 2024-09-05 ENCOUNTER — TELEPHONE (OUTPATIENT)
Dept: PRIMARY CARE CLINIC | Facility: CLINIC | Age: 69
End: 2024-09-05
Payer: MEDICARE

## 2024-09-05 NOTE — TELEPHONE ENCOUNTER
LCSW received referral from MARLEE Barraza for Dr. Reyes's patient.  LCSW called patient and left voicemail requesting a return call to discuss referral.

## 2024-09-06 ENCOUNTER — PATIENT MESSAGE (OUTPATIENT)
Dept: PRIMARY CARE CLINIC | Facility: CLINIC | Age: 69
End: 2024-09-06
Payer: MEDICARE

## 2024-09-06 DIAGNOSIS — J43.9 ACUTE EXACERBATION OF EMPHYSEMA: Primary | ICD-10-CM

## 2024-09-06 RX ORDER — AZITHROMYCIN 250 MG/1
TABLET, FILM COATED ORAL
Qty: 6 TABLET | Refills: 0 | Status: SHIPPED | OUTPATIENT
Start: 2024-09-06

## 2024-09-11 ENCOUNTER — PATIENT MESSAGE (OUTPATIENT)
Dept: PRIMARY CARE CLINIC | Facility: CLINIC | Age: 69
End: 2024-09-11
Payer: MEDICARE

## 2024-09-12 ENCOUNTER — PATIENT MESSAGE (OUTPATIENT)
Dept: PRIMARY CARE CLINIC | Facility: CLINIC | Age: 69
End: 2024-09-12
Payer: MEDICARE

## 2024-09-16 ENCOUNTER — OFFICE VISIT (OUTPATIENT)
Dept: PRIMARY CARE CLINIC | Facility: CLINIC | Age: 69
End: 2024-09-16
Payer: MEDICARE

## 2024-09-16 DIAGNOSIS — N39.0 RECURRENT UTI: ICD-10-CM

## 2024-09-16 DIAGNOSIS — J43.2 CENTRILOBULAR EMPHYSEMA: Primary | ICD-10-CM

## 2024-09-16 PROCEDURE — 99214 OFFICE O/P EST MOD 30 MIN: CPT | Mod: 95,,, | Performed by: PHYSICIAN ASSISTANT

## 2024-09-16 RX ORDER — CIPROFLOXACIN 500 MG/1
500 TABLET ORAL 2 TIMES DAILY
Qty: 14 TABLET | Refills: 0 | Status: SHIPPED | OUTPATIENT
Start: 2024-09-16 | End: 2024-09-23

## 2024-09-16 RX ORDER — PHENAZOPYRIDINE HYDROCHLORIDE 200 MG/1
200 TABLET, FILM COATED ORAL 3 TIMES DAILY PRN
Qty: 30 TABLET | Refills: 0 | Status: SHIPPED | OUTPATIENT
Start: 2024-09-16 | End: 2024-09-26

## 2024-09-16 RX ORDER — ESOMEPRAZOLE MAGNESIUM 40 MG/1
40 CAPSULE, DELAYED RELEASE ORAL DAILY
Qty: 90 CAPSULE | Refills: 3 | Status: SHIPPED | OUTPATIENT
Start: 2024-09-16

## 2024-09-18 ENCOUNTER — TELEPHONE (OUTPATIENT)
Dept: PRIMARY CARE CLINIC | Facility: CLINIC | Age: 69
End: 2024-09-18
Payer: MEDICARE

## 2024-09-18 NOTE — TELEPHONE ENCOUNTER
LCSW made 2 attempts to reach patient regarding 65+ LCSW [facility] referral.  See documentation in chart.  Patient read My chart message 9/12/24.  LCSW will close referral at this time and remain available for any future needs.  PA/Chloé Draper updated.

## 2024-09-23 ENCOUNTER — HOSPITAL ENCOUNTER (OUTPATIENT)
Dept: CARDIOLOGY | Facility: HOSPITAL | Age: 69
Discharge: HOME | End: 2024-09-23
Attending: INTERNAL MEDICINE
Payer: MEDICARE

## 2024-09-23 DIAGNOSIS — I48.0 PAROXYSMAL ATRIAL FIBRILLATION: ICD-10-CM

## 2024-09-23 PROCEDURE — 93226 XTRNL ECG REC<48 HR SCAN A/R: CPT

## 2024-09-23 PROCEDURE — 93225 XTRNL ECG REC<48 HRS REC: CPT

## 2024-09-23 PROCEDURE — 93227 XTRNL ECG REC<48 HR R&I: CPT | Mod: ,,, | Performed by: INTERNAL MEDICINE

## 2024-09-25 ENCOUNTER — PATIENT MESSAGE (OUTPATIENT)
Dept: CARDIOLOGY | Facility: CLINIC | Age: 69
End: 2024-09-25
Payer: MEDICARE

## 2024-09-25 ENCOUNTER — HOSPITAL ENCOUNTER (OUTPATIENT)
Dept: PULMONOLOGY | Facility: HOSPITAL | Age: 69
Discharge: HOME | End: 2024-09-25
Attending: PHYSICIAN ASSISTANT
Payer: MEDICARE

## 2024-09-25 DIAGNOSIS — J43.2 CENTRILOBULAR EMPHYSEMA: ICD-10-CM

## 2024-09-25 LAB
DLCO SINGLE BREATH LLN: 18.8
DLCO SINGLE BREATH PRE REF: 53.9 %
DLCO SINGLE BREATH REF: 24.53
DLCOC SBVA LLN: 3.01
DLCOC SBVA REF: 4.25
DLCOC SINGLE BREATH LLN: 18.8
DLCOC SINGLE BREATH REF: 24.53
DLCOCSBVAULN: 5.48
DLCOCSINGLEBREATHULN: 30.26
DLCOSINGLEBREATHULN: 30.26
DLCOSINGLEBREATHZSCORE: -3.25
DLCOVA LLN: 3.01
DLCOVA PRE REF: 55.5 %
DLCOVA PRE: 2.36 ML/(MIN*MMHG*L) (ref 3.01–5.48)
DLCOVA REF: 4.25
DLCOVAULN: 5.48
ERV LLN: -16449.28
ERV PRE REF: 282.1 %
ERV REF: 0.72
ERVULN: ABNORMAL
FEF 25 75 LLN: 1.37
FEF 25 75 PRE REF: 30.3 %
FEF 25 75 REF: 2.76
FET100 CHG: 7.6 %
FEV05 LLN: 1.1
FEV05 REF: 1.96
FEV1 CHG: -1.5 %
FEV1 FVC LLN: 64
FEV1 FVC PRE REF: 74.2 %
FEV1 FVC REF: 78
FEV1 LLN: 1.91
FEV1 PRE REF: 71.7 %
FEV1 REF: 2.61
FEV1 VOL CHG: -0.03
FEV1FVCZSCORE: -2.4
FEV1ZSCORE: -1.72
FRCPLETH LLN: 2.17
FRCPLETH PREREF: 179.7 %
FRCPLETH REF: 2.99
FRCPLETHULN: 3.82
FVC CHG: 0.2 %
FVC LLN: 2.49
FVC PRE REF: 95.7 %
FVC REF: 3.4
FVC VOL CHG: 0.01
FVCZSCORE: -0.26
IVC PRE: 3.21 L (ref 2.49–4.36)
IVC SINGLE BREATH LLN: 2.49
IVC SINGLE BREATH PRE REF: 94.3 %
IVC SINGLE BREATH REF: 3.4
IVCSINGLEBREATHULN: 4.36
LLN IC: -16447.49
OHS CV EVENT MONITOR DAY: 0
OHS CV HOLTER LENGTH DECIMAL HOURS: 23.98
OHS CV HOLTER LENGTH HOURS: 23
OHS CV HOLTER LENGTH MINUTES: 59
OHS CV HOLTER SINUS AVERAGE HR: 75
OHS CV HOLTER SINUS MAX HR: 112
OHS CV HOLTER SINUS MIN HR: 58
PEF LLN: 4.53
PEF PRE REF: 63.7 %
PEF REF: 6.52
POST FEF 25 75: 0.73 L/S (ref 1.37–4.16)
POST FET 100: 8.52 SEC
POST FEV1 FVC: 56.6 % (ref 64.48–88.82)
POST FEV1: 1.84 L (ref 1.91–3.29)
POST FEV5: 1.36 L (ref 1.1–2.81)
POST FVC: 3.26 L (ref 2.49–4.36)
POST PEF: 4.43 L/S (ref 4.53–8.51)
PRE DLCO: 13.22 ML/(MIN*MMHG) (ref 18.8–30.26)
PRE ERV: 2.03 L (ref -16449.28–16450.72)
PRE FEF 25 75: 0.84 L/S (ref 1.37–4.16)
PRE FET 100: 7.92 SEC
PRE FEV05 REF: 70 %
PRE FEV1 FVC: 57.53 % (ref 64.48–88.82)
PRE FEV1: 1.87 L (ref 1.91–3.29)
PRE FEV5: 1.37 L (ref 1.1–2.81)
PRE FRC PL: 5.38 L (ref 2.17–3.82)
PRE FVC: 3.25 L (ref 2.49–4.36)
PRE IC: 1.23 L (ref -16447.49–16452.51)
PRE PEF: 4.15 L/S (ref 4.53–8.51)
PRE REF IC: 48.8 %
PRE RV: 3.35 L (ref 1.7–2.85)
PRE TLC: 6.61 L (ref 4.79–6.76)
RAW PRE REF: 55.9 %
RAW PRE: 1.71 CMH2O*S/L (ref 3.06–3.06)
RAW REF: 3.06
REF IC: 2.51
RV LLN: 1.7
RV PRE REF: 147.4 %
RV REF: 2.28
RVTLC LLN: 33
RVTLC PRE REF: 119.7 %
RVTLC PRE: 50.76 % (ref 32.83–52.01)
RVTLC REF: 42
RVTLCULN: 52
RVULN: 2.85
SGAW PRE REF: 92.5 %
SGAW PRE: 0.09 1/(CMH2O*S) (ref 0.1–0.1)
SGAW REF: 0.1
TLC LLN: 4.79
TLC PRE REF: 114.4 %
TLC REF: 5.78
TLC ULN: 6.76
TLCZSCORE: 1.39
ULN IC: ABNORMAL
VA PRE: 5.61 L (ref 5.63–5.63)
VA SINGLE BREATH LLN: 5.63
VA SINGLE BREATH PRE REF: 99.7 %
VA SINGLE BREATH REF: 5.63
VASINGLEBREATHULN: 5.63
VC LLN: 2.49
VC PRE REF: 95.7 %
VC PRE: 3.25 L (ref 2.49–4.36)
VC REF: 3.4
VC ULN: 4.36

## 2024-09-25 PROCEDURE — 94729 DIFFUSING CAPACITY: CPT | Performed by: INTERNAL MEDICINE

## 2024-09-25 PROCEDURE — 94060 EVALUATION OF WHEEZING: CPT | Performed by: INTERNAL MEDICINE

## 2024-09-25 PROCEDURE — 94726 PLETHYSMOGRAPHY LUNG VOLUMES: CPT | Performed by: INTERNAL MEDICINE

## 2024-09-26 ENCOUNTER — PATIENT MESSAGE (OUTPATIENT)
Dept: PRIMARY CARE CLINIC | Facility: CLINIC | Age: 69
End: 2024-09-26
Payer: MEDICARE

## 2024-09-26 DIAGNOSIS — J44.9 CHRONIC OBSTRUCTIVE PULMONARY DISEASE, UNSPECIFIED COPD TYPE: Primary | ICD-10-CM

## 2024-09-26 RX ORDER — UMECLIDINIUM BROMIDE AND VILANTEROL TRIFENATATE 62.5; 25 UG/1; UG/1
1 POWDER RESPIRATORY (INHALATION) DAILY
Qty: 30 EACH | Refills: 11 | Status: SHIPPED | OUTPATIENT
Start: 2024-09-26 | End: 2024-09-30

## 2024-09-30 RX ORDER — UMECLIDINIUM BROMIDE AND VILANTEROL TRIFENATATE 62.5; 25 UG/1; UG/1
1 POWDER RESPIRATORY (INHALATION) DAILY
Qty: 90 EACH | Refills: 3 | Status: SHIPPED | OUTPATIENT
Start: 2024-09-30 | End: 2024-10-03 | Stop reason: SINTOL

## 2024-10-01 ENCOUNTER — PATIENT MESSAGE (OUTPATIENT)
Dept: CARDIOLOGY | Facility: CLINIC | Age: 69
End: 2024-10-01
Payer: MEDICARE

## 2024-10-03 ENCOUNTER — E-CONSULT (OUTPATIENT)
Dept: PHARMACY | Facility: CLINIC | Age: 69
End: 2024-10-03
Payer: MEDICARE

## 2024-10-03 DIAGNOSIS — Z72.0 TOBACCO USE: Primary | ICD-10-CM

## 2024-10-03 RX ORDER — SALMETEROL XINAFOATE 50 UG/1
1 POWDER, METERED ORAL; RESPIRATORY (INHALATION) 2 TIMES DAILY
Qty: 60 EACH | Refills: 0 | Status: SHIPPED | OUTPATIENT
Start: 2024-10-03 | End: 2025-10-03

## 2024-10-03 NOTE — CONSULTS
[facility] - Pharmacy/Wellness  Response for E-Consult     Patient Name: Karina Thomas  MRN: [id number]  Primary Care Provider: Pinky Reyes MD   Requesting Provider: Chloé Draper PA-C  E-Consult to Pharmacy  Consult performed by: Colten Rob PharmD  Consult ordered by: Chloé Draper PA-C  Reason for consult: Side effects of Anoro  Assessment/Recommendations: Lino Luevano!    Unfortunately, the neck and back pain is listed as a common adverse effect.  I have checked other LABA/antimuscarinic combos, and they all seem to to have similar degrees of neuromuscular and skeletal pain associated with their use.  If the adverse effects are affecting her daily life activities and the benefit is not enough for her to continue, I would consider choosing something else within the class to see if it would be more tolerable.  Another option is something like Serevent which is a LABA only.  It does not have neuromuscular or skeletal pain listed as an adverse effect.          Recommendation: Challenge with another medication within the class or go with LABA only    Contingency that warrants a repeat eConsult or referral: No    Total time of Consultation: 15 minute    I did not speak to the requesting provider verbally about this.     *This eConsult is based on the clinical data available to me and is furnished without benefit of a physical examination. The eConsult will need to be interpreted in light of any clinical issues or changes in patient status not available to me at the time of filing this eConsults. Significant changes in patient condition or level of acuity should result in immediate formal consultation and reevaluation. Please alert me if you have further questions.    Thank you for this eConsult referral.     Colten Rob PharmD  [facility]

## 2024-10-16 ENCOUNTER — TELEPHONE (OUTPATIENT)
Dept: PRIMARY CARE CLINIC | Facility: CLINIC | Age: 69
End: 2024-10-16
Payer: MEDICARE

## 2024-10-16 ENCOUNTER — HOSPITAL ENCOUNTER (OUTPATIENT)
Dept: RADIOLOGY | Facility: HOSPITAL | Age: 69
Discharge: HOME | End: 2024-10-16
Attending: INTERNAL MEDICINE
Payer: MEDICARE

## 2024-10-16 VITALS — HEIGHT: 69 IN | BODY MASS INDEX: 23.99 KG/M2 | WEIGHT: 162 LBS

## 2024-10-16 DIAGNOSIS — Z12.31 VISIT FOR SCREENING MAMMOGRAM: ICD-10-CM

## 2024-10-16 DIAGNOSIS — Z12.31 ENCOUNTER FOR SCREENING MAMMOGRAM FOR MALIGNANT NEOPLASM OF BREAST: ICD-10-CM

## 2024-10-16 DIAGNOSIS — Z12.31 ENCOUNTER FOR SCREENING MAMMOGRAM FOR MALIGNANT NEOPLASM OF BREAST: Primary | ICD-10-CM

## 2024-10-16 PROCEDURE — 77067 SCR MAMMO BI INCL CAD: CPT | Mod: TC

## 2024-10-16 PROCEDURE — 77063 BREAST TOMOSYNTHESIS BI: CPT | Mod: TC

## 2024-10-16 NOTE — TELEPHONE ENCOUNTER
----- Message from Karen sent at 10/16/2024  8:50 AM CDT -----  Regarding: Request for Mammogram Order  Good morning.    When the mammo screen was rescheduled, a glitch in Epic closed the order (IS ticket pending).  May we have an order to link to today's appointment?  Thank you.

## 2024-10-23 ENCOUNTER — PATIENT MESSAGE (OUTPATIENT)
Dept: PRIMARY CARE CLINIC | Facility: CLINIC | Age: 69
End: 2024-10-23

## 2024-10-23 ENCOUNTER — OFFICE VISIT (OUTPATIENT)
Dept: PRIMARY CARE CLINIC | Facility: CLINIC | Age: 69
End: 2024-10-23
Payer: MEDICARE

## 2024-10-23 VITALS
TEMPERATURE: 98 F | WEIGHT: 160.63 LBS | DIASTOLIC BLOOD PRESSURE: 56 MMHG | HEART RATE: 68 BPM | OXYGEN SATURATION: 98 % | SYSTOLIC BLOOD PRESSURE: 102 MMHG | HEIGHT: 69 IN | BODY MASS INDEX: 23.79 KG/M2

## 2024-10-23 DIAGNOSIS — M54.9 MID BACK PAIN: ICD-10-CM

## 2024-10-23 DIAGNOSIS — G44.86 CERVICOGENIC HEADACHE: ICD-10-CM

## 2024-10-23 DIAGNOSIS — F41.9 ANXIETY: ICD-10-CM

## 2024-10-23 DIAGNOSIS — J44.9 CHRONIC OBSTRUCTIVE PULMONARY DISEASE, UNSPECIFIED COPD TYPE: Primary | ICD-10-CM

## 2024-10-23 PROCEDURE — 99999 PR PBB SHADOW E&M-EST. PATIENT-LVL IV: CPT | Mod: PBBFAC,,, | Performed by: INTERNAL MEDICINE

## 2024-10-23 PROCEDURE — 99214 OFFICE O/P EST MOD 30 MIN: CPT | Mod: S$PBB,,, | Performed by: INTERNAL MEDICINE

## 2024-10-23 PROCEDURE — 99214 OFFICE O/P EST MOD 30 MIN: CPT | Mod: PBBFAC,PN | Performed by: INTERNAL MEDICINE

## 2024-10-23 RX ORDER — SALMETEROL XINAFOATE 50 UG/1
1 POWDER, METERED ORAL; RESPIRATORY (INHALATION) 2 TIMES DAILY
Qty: 180 EACH | Refills: 3 | Status: SHIPPED | OUTPATIENT
Start: 2024-10-23 | End: 2025-10-23

## 2024-10-23 RX ORDER — BUSPIRONE HYDROCHLORIDE 10 MG/1
TABLET ORAL
Qty: 90 TABLET | Refills: 2 | Status: SHIPPED | OUTPATIENT
Start: 2024-10-23

## 2024-10-23 NOTE — PROGRESS NOTES
Subjective:       Patient ID: Karina Thomas is a 69 y.o. female.    Chief Complaint: Follow-up    Follow-up  Associated symptoms include arthralgias, neck pain and weakness. Pertinent negatives include no chest pain, headaches, joint swelling or vomiting.     At our last visit, we discussed weaning down on ativan for anxiety and was started on buspar 5mg BID. Tried it for a mo and reports didn't feel any different. Last fill was 10/8/24 #30 and prior to that 8/30/24. Does not want an SSRI because of potential weight gain.    COPD (mod centrilobular emphysema on imaging) PFT 9/30/24 - MRC dyspnea scale of 1 - breathlessness when hurrying on the level or walking up a slight hill.  Working on quitting. Tried smoking cessation in the last.   Pt was placed on anoro ellipta but developed back and neck pain. E consult to pharmacist said that neck and back pain is a common side effect of LABA/antimuscarinic combo drugs.   Serevent (LABA only) may be an option.  The pain from Anoro resolved after changing to Severent. Reports MRC is more like a 0 right now. No longer w/ SOB. Hasn't had to use albuterol except prn.   Down to 4 cigarettes a day by chewing the nicorette gum.     Reports HA from the neck. Soreness all across the lower back. Even when she's pressing on it, it's sore. Feels like her legs still freezing from knees down.   Will think about PT. She's not sure about it.  Doesn't sleep well anyway.     On B complex daily. B12 384 a week ago.     Review of Systems   Constitutional:  Negative for activity change and unexpected weight change.   HENT:  Negative for hearing loss, rhinorrhea and trouble swallowing.    Eyes:  Negative for discharge and visual disturbance.   Respiratory:  Positive for chest tightness and wheezing.    Cardiovascular:  Negative for chest pain and palpitations.   Gastrointestinal:  Negative for blood in stool, constipation, diarrhea and vomiting.   Endocrine: Negative for polydipsia and polyuria.  "  Genitourinary:  Negative for difficulty urinating, dysuria, hematuria and menstrual problem.   Musculoskeletal:  Positive for arthralgias and neck pain. Negative for joint swelling.   Neurological:  Positive for weakness. Negative for headaches.   Psychiatric/Behavioral:  Negative for confusion and dysphoric mood.          Objective:      Physical Exam    BP (!) 102/56   Pulse 68   Temp 97.9 °F (36.6 °C)   Ht 5' 9" (1.753 m)   Wt 72.8 kg (160 lb 9.7 oz)   SpO2 98%   BMI 23.72 kg/m²     GEN - A+OX4, NAD   HEENT - PERRL, EOMI, OP clear. MMM. TM normal.   Neck - No cervical LAD.   CV - RRR, no m/r   Chest - CTAB, no wheezing or rhonchi  Abd - S/NT/ND/+BS.   Ext - 2+BDP and radial pulses. No C/C/E.  Neuro - 5/5 BUE and BLE strength.  MSK - normal gait. Pain on palp and tense cervical and lower thoracic paraspinal m.   Skin - No rash.    Previous labs reviewed.     Assessment/Plan     Karina was seen today for follow-up.    Diagnoses and all orders for this visit:    Chronic obstructive pulmonary disease, unspecified COPD type - albuterol prn. TRUJILLO better. Cut back significantly on smoking. Using nicorette gum.   -     salmeteroL 50 mcg/dose (SEREVENT DISKUS) 50 mcg/dose diskus inhaler; Inhale 1 puff into the lungs 2 (two) times daily. Controller    Cervicogenic headache  -     Ambulatory referral/consult to Physical/Occupational Therapy; Future    Mid back pain  -     Ambulatory referral/consult to Physical/Occupational Therapy; Future    Anxiety - pt is amenable on trying buspar 10mg BID (5mg did not work). Ok to still take ativan prn.   -     busPIRone (BUSPAR) 10 MG tablet; Take 1 tab twice daily and an extra one if you have increased anxiety.    Declines flu and COVID vaccines.    Follow up in about 2 months (around 12/23/2024).      Pinky Reyes MD  Department of Internal Medicine - [facility]  9:58 AM    "

## 2024-10-30 ENCOUNTER — OFFICE VISIT (OUTPATIENT)
Dept: CARDIOLOGY | Facility: CLINIC | Age: 69
End: 2024-10-30
Payer: MEDICARE

## 2024-10-30 VITALS
DIASTOLIC BLOOD PRESSURE: 82 MMHG | WEIGHT: 161.38 LBS | BODY MASS INDEX: 23.83 KG/M2 | HEART RATE: 62 BPM | SYSTOLIC BLOOD PRESSURE: 135 MMHG

## 2024-10-30 DIAGNOSIS — I70.0 AORTIC ATHEROSCLEROSIS: Primary | ICD-10-CM

## 2024-10-30 DIAGNOSIS — I10 HYPERTENSION, UNSPECIFIED TYPE: ICD-10-CM

## 2024-10-30 DIAGNOSIS — I48.0 PAROXYSMAL ATRIAL FIBRILLATION: ICD-10-CM

## 2024-10-30 DIAGNOSIS — E78.5 HYPERLIPIDEMIA, UNSPECIFIED HYPERLIPIDEMIA TYPE: ICD-10-CM

## 2024-10-30 PROCEDURE — 99214 OFFICE O/P EST MOD 30 MIN: CPT | Mod: S$PBB,,, | Performed by: INTERNAL MEDICINE

## 2024-10-30 PROCEDURE — 99999 PR PBB SHADOW E&M-EST. PATIENT-LVL III: CPT | Mod: PBBFAC,,, | Performed by: INTERNAL MEDICINE

## 2024-10-30 PROCEDURE — 99213 OFFICE O/P EST LOW 20 MIN: CPT | Mod: PBBFAC | Performed by: INTERNAL MEDICINE

## 2024-10-30 RX ORDER — AMLODIPINE BESYLATE 10 MG/1
10 TABLET ORAL DAILY
Qty: 90 TABLET | Refills: 3 | Status: SHIPPED | OUTPATIENT
Start: 2024-10-30

## 2024-10-30 RX ORDER — METOPROLOL TARTRATE 50 MG/1
50 TABLET ORAL 2 TIMES DAILY
Qty: 180 TABLET | Refills: 3 | Status: SHIPPED | OUTPATIENT
Start: 2024-10-30

## 2024-10-30 RX ORDER — ROSUVASTATIN CALCIUM 20 MG/1
20 TABLET, COATED ORAL DAILY
Qty: 90 TABLET | Refills: 3 | Status: SHIPPED | OUTPATIENT
Start: 2024-10-30

## 2024-10-31 ENCOUNTER — PATIENT MESSAGE (OUTPATIENT)
Dept: PRIMARY CARE CLINIC | Facility: CLINIC | Age: 69
End: 2024-10-31
Payer: MEDICARE

## 2024-10-31 NOTE — TELEPHONE ENCOUNTER
Ms. Vance is requesting a recommendation for pain and inflammation, she can not take ibuprofen due to taking eliquis. Please advise.

## 2024-11-05 ENCOUNTER — PROCEDURE VISIT (OUTPATIENT)
Dept: NEUROLOGY | Facility: CLINIC | Age: 69
End: 2024-11-05
Payer: MEDICARE

## 2024-11-05 DIAGNOSIS — R20.0 RIGHT SIDED NUMBNESS: ICD-10-CM

## 2024-11-05 PROCEDURE — 95886 MUSC TEST DONE W/N TEST COMP: CPT | Mod: 26,S$PBB,, | Performed by: PSYCHIATRY & NEUROLOGY

## 2024-11-05 PROCEDURE — 95886 MUSC TEST DONE W/N TEST COMP: CPT | Mod: PBBFAC | Performed by: PSYCHIATRY & NEUROLOGY

## 2024-11-05 PROCEDURE — 95912 NRV CNDJ TEST 11-12 STUDIES: CPT | Mod: PBBFAC | Performed by: PSYCHIATRY & NEUROLOGY

## 2024-11-05 PROCEDURE — 99203 OFFICE O/P NEW LOW 30 MIN: CPT | Mod: S$PBB,,, | Performed by: PSYCHIATRY & NEUROLOGY

## 2024-11-05 PROCEDURE — 95912 NRV CNDJ TEST 11-12 STUDIES: CPT | Mod: 26,S$PBB,, | Performed by: PSYCHIATRY & NEUROLOGY

## 2024-11-05 NOTE — PROCEDURES
EMG W/ ULTRASOUND AND NERVE CONDUCTION TEST 2 Extremities    Date/Time: 11/5/2024 9:00 AM    Performed by: Fortino Barry MD  Authorized by: Pinky Reyes MD                                                                 [facility] [address] Neurology    Subjective:       Patient ID: Karina Thomas is a 69 y.o. female here for a EMG focused evaluation for arm and leg numbness. Previous visits and diagnostic evaluation reviewed.  Patient describes intermittent right-sided numbness.  She specifically describes numbness which can occur over the right face and neck region as well as the right trunk.  Numbness can sometimes involve the right arm as well as the right leg below-the-knee.  She has had a workup including a CT scan of the brain.  She does have a history of atrial fibrillation and smoking.  HPI  Review of patient's allergies indicates:  No Known Allergies   There were no vitals filed for this visit.   Chief Complaint: No chief complaint on file.    Past Medical History:   Diagnosis Date    Anxiety     GERD (gastroesophageal reflux disease)     Hyperlipidemia       Social History     Socioeconomic History    Marital status:    Occupational History    Occupation: retired    Tobacco Use    Smoking status: Every Day     Current packs/day: 1.00     Average packs/day: 1 pack/day for 26.0 years (26.0 ttl pk-yrs)     Types: Cigarettes    Smokeless tobacco: Never   Substance and Sexual Activity    Alcohol use: Yes     Alcohol/week: 7.0 standard drinks of alcohol     Types: 7 Glasses of wine per week    Drug use: No    Sexual activity: Yes     Partners: Male     Birth control/protection: None     Comment:     Social History Narrative    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     Walks about 2 days a week. Has a FITBIT and goes 10k steps every day.      Social Drivers of Health     Financial Resource Strain: Low Risk  (2/23/2024)    Overall Financial Resource Strain (CARDIA)     Difficulty of  Paying Living Expenses: Not hard at all   Food Insecurity: No Food Insecurity (2/23/2024)    Hunger Vital Sign     Worried About Running Out of Food in the Last Year: Never true     Ran Out of Food in the Last Year: Never true   Transportation Needs: No Transportation Needs (2/23/2024)    PRAPARE - Transportation     Lack of Transportation (Medical): No     Lack of Transportation (Non-Medical): No   Physical Activity: Insufficiently Active (2/23/2024)    Exercise Vital Sign     Days of Exercise per Week: 2 days     Minutes of Exercise per Session: 10 min   Stress: Stress Concern Present (2/23/2024)    [employer] [place] [employer] - Occupational Stress Questionnaire     Feeling of Stress : To some extent   Housing Stability: Low Risk  (2/23/2024)    Housing Stability Vital Sign     Unable to Pay for Housing in the Last Year: No     Number of Places Lived in the Last Year: 1     Unstable Housing in the Last Year: No            Objective:      Physical Exam    Constitutional: Patient appears well-nourished.   Head: Normocephalic and atraumatic.   Mouth/Throat: Oropharynx is clear and moist.   Pulmonary/Chest: Effort normal.   Abdominal: Soft.   Skin: Skin is warm and dry.      General:  Patient is alert and cooperative.  Affect:  Patient is appropriate to surroundings and environment.  Language:  Speech is fluent.  HEENT:  There are no outward signs of trauma to head or face.  Cranial Nerves:  Pupils are equal round and reactive to light. Extra-ocular movements are intact. Face, tongue, and palate are symmetrical.  Motor:  Patient exhibits normal strength testing in bilateral proximal and distal upper and lower extremities.  Reflexes:  Symmetrical in bilateral upper and lower extremities.  Absent ankle reflexes  Gait:  Ambulation is independent without use of cane or walker without signs of ataxia or circumduction.  Cerebellar:  No evidence of dysmetria.  Sensory:  Intact to sensory modalities  tested.  Musculoskeletal:  There is no severe tenderness to palpation and manipulation of cervical and lumbar spine regions.   Assessment:       We reviewed and discussed at length results of EMG of the right upper and lower extremities performed today which was essentially normal without significant evidence of radiculopathy or polyneuropathy. These findings are available via media section of chart review.   1. Right sided numbness              Plan:       We discussed treatment options at length.  We did discuss the fact that the etiology may be central nervous system.  We did discuss smoking cessation in relation to stroke prevention.  Continue Eliquis.  Recommend patient keep appointment with referring provider.         I spent a total of 35 minutes on the day of the visit. This includes face to face time and non-face to face time preparing to see the patient (eg, review of tests), obtaining and/or reviewing separately obtained history, documenting clinical information in the electronic or other health record, independently interpreting results and communicating results to the patient/family/caregiver, or care coordinator.    Fortino Barry MD, FAAN  11/05/2024   9:51 AM       A dictation device was used to produce this document. Use of such devices sometimes results in grammatical errors or replacement of words that sound similarly.

## 2024-11-08 ENCOUNTER — TELEPHONE (OUTPATIENT)
Dept: PRIMARY CARE CLINIC | Facility: CLINIC | Age: 69
End: 2024-11-08
Payer: MEDICARE

## 2024-11-08 NOTE — TELEPHONE ENCOUNTER
Please call and notify pt:  Her EMG of the arm and leg are normal meaning the feeling of water drops/numbness of the RUE and RLE is not due to neuropathy. CT of the head is also normal.(She can't tolerate an MRI due to claustrophobia). At this time I would recommend neurology evaluation, which she has an appt on 11/12/24 already scheduled.

## 2024-11-10 ENCOUNTER — PATIENT MESSAGE (OUTPATIENT)
Dept: PRIMARY CARE CLINIC | Facility: CLINIC | Age: 69
End: 2024-11-10
Payer: MEDICARE

## 2024-11-11 RX ORDER — UMECLIDINIUM BROMIDE AND VILANTEROL TRIFENATATE 62.5; 25 UG/1; UG/1
1 POWDER RESPIRATORY (INHALATION) NIGHTLY
Qty: 60 EACH | Refills: 3 | Status: SHIPPED | OUTPATIENT
Start: 2024-11-11

## 2024-11-11 NOTE — TELEPHONE ENCOUNTER
Let her know to stop the serevent and start anoro ellipta 1 inhalation nightly. This has 2 meds in 1 as opposed to serevent. If that doesn't control her SOB, then will escalate to triple therapy like breztri or trelegy. If she does feel SOB, can use albuterol prn.     She's asking for something for inflammation. Can we clarify where the pain is? Did she do PT for cervicogenic HA/mid back pain?

## 2024-11-14 ENCOUNTER — PATIENT MESSAGE (OUTPATIENT)
Dept: PAIN MEDICINE | Facility: CLINIC | Age: 69
End: 2024-11-14
Payer: MEDICARE

## 2024-11-23 ENCOUNTER — PATIENT MESSAGE (OUTPATIENT)
Dept: PRIMARY CARE CLINIC | Facility: CLINIC | Age: 69
End: 2024-11-23
Payer: MEDICARE

## 2024-12-02 ENCOUNTER — PATIENT MESSAGE (OUTPATIENT)
Dept: PRIMARY CARE CLINIC | Facility: CLINIC | Age: 69
End: 2024-12-02
Payer: MEDICARE

## 2024-12-09 ENCOUNTER — TELEPHONE (OUTPATIENT)
Dept: PRIMARY CARE CLINIC | Facility: CLINIC | Age: 69
End: 2024-12-09
Payer: MEDICARE

## 2024-12-09 DIAGNOSIS — J44.1 COPD EXACERBATION: Primary | ICD-10-CM

## 2024-12-09 NOTE — TELEPHONE ENCOUNTER
----- Message from Abi sent at 12/9/2024  8:14 AM CST -----  Contact: [phone]  Pt would like appt discussing copd.     Please call

## 2024-12-09 NOTE — TELEPHONE ENCOUNTER
Call back  to patient for FU with COPD request.     Patient reported she is coughing at night which is keeping her up.  She has moderate clear sputum production  She is having audible wheezing at times night and day, sleeping with HOB elevated.   No pleuritic or chest pain with inhalation  No fever  No sore throat, some nasal congestion but can breath though nose  Using Tylenol for achy feeling all over    Using inhaler appropriately  Encouraged fluid intake. She reports drinking 5-6 16oz daily  Has mucinex at home and will start using.  Encouraged deep breaths with pursed lip breathing  Has used aerosol inhalation treatment at urgent care and they seem to help in past.     Does not think she needs to be seen today. Wanting to know if there is anything else she could use to relieve s/s.

## 2024-12-09 NOTE — TELEPHONE ENCOUNTER
Needs appt for eval. Ordered CXR. Can she see Dr. Ramirez tomorrow? UC if symptoms worsen tonight.

## 2024-12-10 ENCOUNTER — HOSPITAL ENCOUNTER (OUTPATIENT)
Dept: RADIOLOGY | Facility: HOSPITAL | Age: 69
Discharge: HOME | End: 2024-12-10
Attending: INTERNAL MEDICINE
Payer: MEDICARE

## 2024-12-10 ENCOUNTER — OFFICE VISIT (OUTPATIENT)
Dept: PRIMARY CARE CLINIC | Facility: CLINIC | Age: 69
End: 2024-12-10
Payer: MEDICARE

## 2024-12-10 VITALS
HEART RATE: 87 BPM | OXYGEN SATURATION: 94 % | DIASTOLIC BLOOD PRESSURE: 70 MMHG | BODY MASS INDEX: 23.62 KG/M2 | WEIGHT: 159.94 LBS | SYSTOLIC BLOOD PRESSURE: 118 MMHG

## 2024-12-10 DIAGNOSIS — J43.2 CENTRILOBULAR EMPHYSEMA: ICD-10-CM

## 2024-12-10 DIAGNOSIS — J44.1 COPD EXACERBATION: ICD-10-CM

## 2024-12-10 DIAGNOSIS — R06.02 WAKING AT NIGHT SHORT OF BREATH: Primary | ICD-10-CM

## 2024-12-10 PROCEDURE — 99213 OFFICE O/P EST LOW 20 MIN: CPT | Mod: PBBFAC,25,PN

## 2024-12-10 PROCEDURE — 99214 OFFICE O/P EST MOD 30 MIN: CPT | Mod: S$PBB,,,

## 2024-12-10 PROCEDURE — 71046 X-RAY EXAM CHEST 2 VIEWS: CPT | Mod: TC

## 2024-12-10 PROCEDURE — 71046 X-RAY EXAM CHEST 2 VIEWS: CPT | Mod: 26,,, | Performed by: RADIOLOGY

## 2024-12-10 PROCEDURE — 99999 PR PBB SHADOW E&M-EST. PATIENT-LVL III: CPT | Mod: PBBFAC,,,

## 2024-12-11 PROBLEM — R06.02 WAKING AT NIGHT SHORT OF BREATH: Status: ACTIVE | Noted: 2024-12-11

## 2024-12-11 PROBLEM — J43.2 CENTRILOBULAR EMPHYSEMA: Status: ACTIVE | Noted: 2024-12-11

## 2024-12-11 NOTE — ASSESSMENT & PLAN NOTE
- CXR ordered/reviewed with no significant findings  - Symptoms not exacerbated by diet/foods  - Euvolemic volume status with no abnormal cardiac findings  - H/o pAfib rate controled on BB  - Recent 24hr holter monitor with no abnormal findings  - No urgent concern for URI/LRI  - Reassured pt, encouraged OTC management with antihistamine and decongestant while continuing current therapies

## 2024-12-11 NOTE — PROGRESS NOTES
[facility]    Subjective     Patient Name: Karina Thomas  YOB: 1955  Patient Age: 69 y.o.  Patient Sex: female  Patient Phone: [phone]  PCP: Pinky Reyes MD  Last PCP Appointment: 10/23/2024    History of Present Illness    CHIEF COMPLAINT:  Ms. Thomas is a patient of Dr Reyes's who presents today with shortness of breath at night.    RESPIRATORY CONCERNS:  She experiences shortness of breath at night, particularly when lying down, accompanied by congestion. No significant breathing difficulties during the day. She uses Mucinex for symptom management and props herself up with pillows at night to alleviate discomfort. She uses an Anora inhaler at night and albuterol inhaler twice daily, typically in the morning and as needed. She notes an increase in albuterol usage compared to a month ago when she used it daily or less frequently. She quit smoking one month ago. Describes being able to lay flat but just has awakenings with SOB that improve when sitting up.    ATRIAL FIBRILLATION:  She is on Eliquis and Metoprolol for atrial fibrillation (AFib) management. She reports no symptoms of AFib and states she would have never known about the condition if not told by her healthcare providers. Recent Holter monitor test showed no evidence of AFib, which the cardiologist attributed to well-controlled condition with current medication regimen.    MUSCULOSKELETAL PAIN:  She reports back pain, characterized by soreness extending down her entire back, similar to feeling sore after overexertion. The pain worsens when she remains stationary for extended periods, particularly when sitting at her desk at work. She finds relief by getting up and moving around. She takes Tylenol 500 mg, two tablets for pain relief, but reports it's ineffective.    URINARY TRACT INFECTIONS:  She reports a propensity to develop UTIs and has an upcoming appointment with a urologist next month to address this recurring issue. She  keeps cephalexin at home for UTI management, as prescribed by her physician. When experiencing a UTI, she describes symptoms including urinary frequency, pressure, and significant pain during urination.    GERD:   Managed with Nexium. No recent exacerbation of symptoms.  Answers submitted by the patient for this visit:  Shortness of Breath Questionnaire (Submitted on 12/9/2024)  Chief Complaint: Shortness of breath  Chronicity: recurrent  Onset: 1 to 4 weeks ago  Frequency: daily  Progression since onset: waxing and waning  Episode duration: 45 Seconds  sputum production: Yes  PND: Yes  neck pain: Yes  wheezing: Yes  Improvement on treatment: moderate  Risk factors for DVT/PE: no known risk factors  COPD: Yes          Health Maintenance and Care Gaps  TDap is UTD.   Influenza is UTD.  Pneumovax is UTD.   Zostavax is not UTD.       Covid is not UTD.  RSV is not UTD.  Colonoscopy is UTD.   DEXA  is UTD.  Low Dose CT scan is UTD in pts if 55-79 y/o with 30 pack yr smoking hx and currently smoke or have quit within past 15 yrs.     Screening:  Hepatitis C is UTD in pts born between 4676-9491.   HIV is UTD     Prior to Admission medications    Medication Sig Start Date End Date Taking? Authorizing Provider   amLODIPine (NORVASC) 10 MG tablet Take 1 tablet (10 mg total) by mouth once daily. 10/30/24  Yes Rossana García MD   apixaban (ELIQUIS) 5 mg Tab Take 1 tablet (5 mg total) by mouth 2 (two) times daily. 10/30/24  Yes Rossana García MD   ascorbic acid (VITAMIN C ORAL) Take 1 tablet by mouth once daily.   Yes Provider, Historical   calcium carbonate (TUMS) 200 mg calcium (500 mg) chewable tablet Take 2 tablets by mouth every 8 (eight) hours as needed for Heartburn.   Yes Provider, Historical   ergocalciferol, vitamin D2, (VITAMIN D ORAL) Take 1 tablet by mouth once daily.   Yes Provider, Historical   esomeprazole (NEXIUM) 40 MG capsule Take 1 capsule (40 mg total) by mouth once daily. 9/16/24  Yes Pinky Reyes MD   ibuprofen  (ADVIL,MOTRIN) 200 MG tablet Take 400-600 mg by mouth every 8 (eight) hours as needed for Pain. (Second choice)   Yes Provider, Historical   L. rhamnosus GG/inulin (CULTURELLE PROBIOTICS ORAL) Take 1 capsule by mouth once daily.   Yes Provider, Historical   LORazepam (ATIVAN) 0.5 MG tablet Take 1 tablet (0.5 mg total) by mouth daily as needed for Anxiety. 7/19/24  Yes Pinky Reyes MD   magnesium hydroxide 400 mg/5 ml (MILK OF MAGNESIA) 400 mg/5 mL Susp Take 30 mLs by mouth daily as needed (constipation).   Yes Provider, Historical   metoprolol tartrate (LOPRESSOR) 50 MG tablet Take 1 tablet (50 mg total) by mouth 2 (two) times daily. 10/30/24  Yes Rossana García MD   rosuvastatin (CRESTOR) 20 MG tablet Take 1 tablet (20 mg total) by mouth once daily. 10/30/24  Yes Rossana García MD   ubidecarenone/vitamin E mixed (COQ10  ORAL) Take 1 tablet by mouth once daily.   Yes Provider, Historical   umeclidinium-vilanteroL (ANORO ELLIPTA) 62.5-25 mcg/actuation DsDv Inhale 1 puff into the lungs nightly. Controller 11/11/24  Yes Pinky Reyes MD   albuterol (PROVENTIL/VENTOLIN HFA) 90 mcg/actuation inhaler Inhale 2 puffs into the lungs every 6 (six) hours as needed for Wheezing. Rescue 5/20/24 10/23/24  Pinky Reyes MD   busPIRone (BUSPAR) 10 MG tablet Take 1 tab twice daily and an extra one if you have increased anxiety. 10/23/24   Pinky Reyes MD       OBJECTIVE:     Vitals:    12/10/24 1435   BP: 118/70   BP Location: Left arm   Patient Position: Sitting   Pulse: 87   SpO2: (!) 94%   Weight: 72.6 kg (159 lb 15.1 oz)       Body mass index is 23.62 kg/m².     PHYSICAL EXAM  GEN - A+OX4, NAD   HEENT - PERRL, EOMI, OP clear  Neck - No thyromegaly or cervical LAD. No thyroid masses felt.  CV - RRR, no m/r   Chest - CTAB, no wheezing or rhonchi  Abd - S/NT/ND/+BS.   Ext - 2+BDP and radial pulses. No C/C/E.  MSK - normal ROM, no tenderness  Neuro - 5/5 BUE and BLE strength.  LN - No axillary or inguinal LAD appreciated.  Skin - No rash.      LABS  Lab Results   Component Value Date    WBC 7.31 10/16/2024    HGB 14.4 10/16/2024    HCT 43.4 10/16/2024    MCV 96 10/16/2024     10/16/2024         CMP  Sodium   Date Value Ref Range Status   10/16/2024 138 136 - 145 mmol/L Final     Potassium   Date Value Ref Range Status   10/16/2024 4.5 3.5 - 5.1 mmol/L Final     Chloride   Date Value Ref Range Status   10/16/2024 103 95 - 110 mmol/L Final     CO2   Date Value Ref Range Status   10/16/2024 25 23 - 29 mmol/L Final     Glucose   Date Value Ref Range Status   10/16/2024 97 70 - 110 mg/dL Final     BUN   Date Value Ref Range Status   10/16/2024 9 8 - 23 mg/dL Final     Creatinine   Date Value Ref Range Status   10/16/2024 0.7 0.5 - 1.4 mg/dL Final     Calcium   Date Value Ref Range Status   10/16/2024 9.3 8.7 - 10.5 mg/dL Final     Total Protein   Date Value Ref Range Status   10/16/2024 7.0 6.0 - 8.4 g/dL Final     Albumin   Date Value Ref Range Status   10/16/2024 4.1 3.5 - 5.2 g/dL Final     Total Bilirubin   Date Value Ref Range Status   10/16/2024 0.6 0.1 - 1.0 mg/dL Final     Comment:     For infants and newborns, interpretation of results should be based  on gestational age, weight and in agreement with clinical  observations.    Premature Infant recommended reference ranges:  Up to 24 hours.............<8.0 mg/dL  Up to 48 hours............<12.0 mg/dL  3-5 days..................<15.0 mg/dL  6-29 days.................<15.0 mg/dL       Alkaline Phosphatase   Date Value Ref Range Status   10/16/2024 57 55 - 135 U/L Final     AST   Date Value Ref Range Status   10/16/2024 29 10 - 40 U/L Final     ALT   Date Value Ref Range Status   10/16/2024 29 10 - 44 U/L Final     Anion Gap   Date Value Ref Range Status   10/16/2024 10 8 - 16 mmol/L Final     eGFR   Date Value Ref Range Status   10/16/2024 >60.0 >60 mL/min/1.73 m^2 Final       ASSESSMENT & PLAN:   Ms. Karina Thomas is a 69 y.o. female who was seen in clinic today for acute SOB and night time  awakenings. Assessed patient's respiratory symptoms, noting increased albuterol inhaler use and nighttime dyspnea.  Reviewed recent chest XR, which showed no acute findings. Reviewed CT Chest LDLS in 2024 s/f cetrilobular emphysema and pulm micro nodule requiring 12 mo f/u. Considered potential causes including lung, heart, and reflux issues. Examined patient, noting some wheezing and crackles in lung bases. Ruled out signs of heart failure exacerbation based on clinical presentation. Considered adding montelukast (Singulair) for allergy-like symptoms associated with asthma pt declined for now. F/U as scheduled with Dr Reyes (PCP).    1. Waking at night short of breath  Overview:  - Symptoms ongoing >2wks  - Pt reported that she breathes normally while lying flat but awakens SOB  - Managed with COPD medications and OTC decongestant  - No fevers, chills, sore throat, sinus pain/congestion, or productive sputum    Assessment & Plan:  - CXR ordered/reviewed with no significant findings  - Symptoms not exacerbated by diet/foods  - Euvolemic volume status with no abnormal cardiac findings  - H/o pAfib rate controled on BB  - Recent 24hr holter monitor with no abnormal findings  - No urgent concern for URI/LRI  - Reassured pt, encouraged OTC management with antihistamine and decongestant while continuing current therapies      2. Centrilobular emphysema  Overview:  - Managed with anoro ellipta daily and PRN JULIO CESAR  - Stopped smoking 1 month ago    Assessment & Plan:  - PFT in 2024 s/f: obstruction with moderately decreased DLCO  - Explained rationale for continuing current medication regimen.  - Discussed importance of proper inhaler technique.  - Continued Anoro: Once daily before bed.  - Continued albuterol inhaler: Use up to 2 times daily as needed, technique reviewed and confirmed as correct.  - Started Mucinex DM: Take as directed for mucus and cough.           F/U with PCP as scheduled    All questions answered. Pt to  call/message the Primary Clinic for any additional concerns        Danilo Ramirez MD  [facility]    This note was generated with the assistance of ambient listening technology. Verbal consent was obtained by the patient and accompanying visitor(s) for the recording of patient appointment to facilitate this note. I attest to having reviewed and edited the generated note for accuracy, though some syntax or spelling errors may persist. Please contact the author of this note for any clarification.

## 2024-12-11 NOTE — ASSESSMENT & PLAN NOTE
- PFT in 2024 s/f: obstruction with moderately decreased DLCO  - Explained rationale for continuing current medication regimen.  - Discussed importance of proper inhaler technique.  - Continued Anoro: Once daily before bed.  - Continued albuterol inhaler: Use up to 2 times daily as needed, technique reviewed and confirmed as correct.  - Started Mucinex DM: Take as directed for mucus and cough.

## 2024-12-13 DIAGNOSIS — F41.9 ANXIETY: ICD-10-CM

## 2024-12-15 ENCOUNTER — PATIENT MESSAGE (OUTPATIENT)
Dept: PRIMARY CARE CLINIC | Facility: CLINIC | Age: 69
End: 2024-12-15
Payer: MEDICARE

## 2024-12-15 DIAGNOSIS — J44.9 CHRONIC OBSTRUCTIVE PULMONARY DISEASE, UNSPECIFIED COPD TYPE: Primary | ICD-10-CM

## 2024-12-16 RX ORDER — LORAZEPAM 0.5 MG/1
0.5 TABLET ORAL DAILY PRN
Qty: 30 TABLET | Refills: 3 | Status: SHIPPED | OUTPATIENT
Start: 2024-12-16

## 2024-12-16 RX ORDER — FLUTICASONE FUROATE, UMECLIDINIUM BROMIDE AND VILANTEROL TRIFENATATE 100; 62.5; 25 UG/1; UG/1; UG/1
1 POWDER RESPIRATORY (INHALATION) DAILY
Qty: 60 EACH | Refills: 2 | Status: SHIPPED | OUTPATIENT
Start: 2024-12-16

## 2024-12-16 NOTE — TELEPHONE ENCOUNTER
Can try changing to trelegy and stopping anoro ellipta. Please have her f/u in a week w/ cindy. Consider sleep study.

## 2024-12-17 ENCOUNTER — TELEPHONE (OUTPATIENT)
Dept: PRIMARY CARE CLINIC | Facility: CLINIC | Age: 69
End: 2024-12-17

## 2024-12-17 ENCOUNTER — OFFICE VISIT (OUTPATIENT)
Dept: PRIMARY CARE CLINIC | Facility: CLINIC | Age: 69
End: 2024-12-17
Payer: MEDICARE

## 2024-12-17 VITALS
OXYGEN SATURATION: 94 % | SYSTOLIC BLOOD PRESSURE: 124 MMHG | TEMPERATURE: 98 F | WEIGHT: 156.31 LBS | DIASTOLIC BLOOD PRESSURE: 72 MMHG | BODY MASS INDEX: 23.15 KG/M2 | HEIGHT: 69 IN | HEART RATE: 90 BPM

## 2024-12-17 DIAGNOSIS — R06.09 DOE (DYSPNEA ON EXERTION): Primary | ICD-10-CM

## 2024-12-17 DIAGNOSIS — J44.9 CHRONIC OBSTRUCTIVE PULMONARY DISEASE, UNSPECIFIED COPD TYPE: ICD-10-CM

## 2024-12-17 DIAGNOSIS — R06.02 WAKING AT NIGHT SHORT OF BREATH: ICD-10-CM

## 2024-12-17 PROCEDURE — 99214 OFFICE O/P EST MOD 30 MIN: CPT | Mod: PBBFAC,PN | Performed by: INTERNAL MEDICINE

## 2024-12-17 PROCEDURE — G2211 COMPLEX E/M VISIT ADD ON: HCPCS | Mod: S$PBB,,, | Performed by: INTERNAL MEDICINE

## 2024-12-17 PROCEDURE — 99214 OFFICE O/P EST MOD 30 MIN: CPT | Mod: S$PBB,,, | Performed by: INTERNAL MEDICINE

## 2024-12-17 PROCEDURE — 99999 PR PBB SHADOW E&M-EST. PATIENT-LVL IV: CPT | Mod: PBBFAC,,, | Performed by: INTERNAL MEDICINE

## 2024-12-17 RX ORDER — FUROSEMIDE 20 MG/1
20 TABLET ORAL DAILY
Qty: 5 TABLET | Refills: 0 | Status: SHIPPED | OUTPATIENT
Start: 2024-12-17 | End: 2025-12-17

## 2024-12-17 NOTE — PROGRESS NOTES
Subjective:       Patient ID: Karina Thomas is a 69 y.o. female.    Chief Complaint: No chief complaint on file.    Shortness of Breath  This is a recurrent problem. The current episode started more than 1 month ago. The problem occurs daily. The problem has been waxing and waning. The average episode lasts 30 seconds. Associated symptoms include neck pain, orthopnea, PND, sputum production and wheezing. Pertinent negatives include no abdominal pain, chest pain, claudication, coryza, ear pain, fever, headaches, leg pain, leg swelling, rash, rhinorrhea, sore throat, swollen glands, syncope or vomiting. The symptoms are aggravated by emotional upset, exercise and lying flat. The patient has no known risk factors for DVT/PE. She has tried beta agonist inhalers for the symptoms. The treatment provided moderate relief. Her past medical history is significant for COPD. There is no history of aspirin allergies, asthma, bronchiolitis, CAD, chronic lung disease, DVT, a heart failure, PE, pneumonia or a recent surgery.   Baseline COPD. Was on anoro ellipta. Pt had sent us messages about persistent sob and so changed to trelegy but hasn't picked up yet.   Quit smoking a month and half ago.     Reports 1 mo of SOB at night andrew lying down along w/ feelings of congestion - when she's laying down at night, feels a drip and causes her to cough. She's had to lay down in the recliner to prop herself up.   Saw Dr. Ramirez 12/10/24   Went back to work.   Progressively getting a little worse. Used albuterol 5x last night - can lay back down but cough. Hasn't picked up the trelegy. No leg swelling.   Reports when she's walking around, she has TRUJILLO. 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     No palpitations. No blood in stool or urine.   No fevers/chills. No green/yellow sputum production.     Of note,   CXR 12/10/24 -   No acute chest disease identified. No detrimental changes noted when compared to prior examination dated  "08/31/2024.     Pft 9/30/24 -   Spirometry shows moderate obstruction. Lung volume determination shows TLC is normal with evidence air trapping is  present. Spirometry remains unimproved following bronchodilator. Flow volume loops show scooping consistent with  obstruction. DLCO is moderately decreased.    Stress test 7/3/23 -     Normal myocardial perfusion scan. There is no evidence of myocardial ischemia or infarction.    The gated perfusion images showed an ejection fraction of 69% at rest. The gated perfusion images showed an ejection fraction of 59% post stress. Normal ejection fraction is greater than 47%.    There is normal wall motion at rest and post stress.    LV cavity size is normal at rest and normal at stress.    The ECG portion of the study is abnormal but not diagnostic due to resting ST-T abnormalities.    The patient reported no chest pain during the stress test.    There were no arrhythmias during stress.    There are no prior studies for comparison.    Review of Systems   Constitutional:  Negative for fever.   HENT:  Negative for ear pain, rhinorrhea and sore throat.    Respiratory:  Positive for sputum production, shortness of breath and wheezing.    Cardiovascular:  Positive for orthopnea and PND. Negative for chest pain, claudication, leg swelling and syncope.   Gastrointestinal:  Negative for abdominal pain and vomiting.   Musculoskeletal:  Positive for neck pain.   Skin:  Negative for rash.   Neurological:  Negative for headaches.         Objective:      Physical Exam    /72   Pulse 90   Temp 97.7 °F (36.5 °C) (Oral)   Ht 5' 9" (1.753 m)   Wt 70.9 kg (156 lb 4.9 oz)   SpO2 (!) 94%   BMI 23.08 kg/m²   Ambulating O2 went down to 88%    GEN - A+OX4, NAD   HEENT - PERRL, EOMI, OP clear. MMM. TM normal.   Neck - No cervical LAD.   CV - RRR, no m/r   Chest - distant heart sounds. CTAB, no wheezing or rhonchi. Normal work of breathing.   Abd - S/NT/ND/+BS.   Ext - 2+BDP and radial " pulses. No LE edema.   Neuro - 5/5 BUE and BLE strength.  MSK - normal gait.   Skin - No rash. Dry skin.     Previous labs reviewed.     Assessment/Plan     Karina was seen today for follow-up.    Diagnoses and all orders for this visit:    TRUJILLO (dyspnea on exertion)  -     CBC Auto Differential; Future  -     Comprehensive Metabolic Panel; Future  -     B-TYPE NATRIURETIC PEPTIDE; Future  -     Echo; Future  -     furosemide (LASIX) 20 MG tablet; Take 1 tablet (20 mg total) by mouth once daily.  -     CT Chest Without Contrast; Future    Chronic obstructive pulmonary disease, unspecified COPD type  -     Six Minute Walk Test to qualify for Home Oxygen; Future  -     CT Chest Without Contrast; Future    Waking at night short of breath    Don't think it's postnasal drip. No wheezing on exam. Albuterol doesn't seem to relieve her SOB when lying down. This is gradual and not sudden onset. No palpitations. On chronic anticoagulation so will get CBC. Check BNP. No leg swelling. Signs and symptoms consistent w/ diastolic HF but will do due diligence w/ CT of chest as long time smoker that quit about a month ago. Trial of lasix 20mg dialy starting today. F/u in 2 days w/ me. Low salt diet. BP ok today.     Follow up in about 2 days (around 12/19/2024).      Pinky Reyes MD  Department of Internal Medicine - [facility]  12:15 PM

## 2024-12-17 NOTE — TELEPHONE ENCOUNTER
----- Message from Billie sent at 12/17/2024  2:04 PM CST -----  Contact: [phone] patient  .[email or web address]     Patient is calling for Medical Advice regarding:Patient is calling due to testing that was schedule . Patient would like Elizabeth to call her back    How long has patient had these symptoms:    Pharmacy name and phone#:    Patient wants a call back or thru [facility]:call     Comments:    Please advise patient replies from provider may take up to 48 hours.

## 2024-12-18 ENCOUNTER — HOSPITAL ENCOUNTER (OUTPATIENT)
Dept: RADIOLOGY | Facility: HOSPITAL | Age: 69
Discharge: HOME | End: 2024-12-18
Attending: INTERNAL MEDICINE
Payer: MEDICARE

## 2024-12-18 ENCOUNTER — TELEPHONE (OUTPATIENT)
Dept: PRIMARY CARE CLINIC | Facility: CLINIC | Age: 69
End: 2024-12-18
Payer: MEDICARE

## 2024-12-18 DIAGNOSIS — R06.09 DOE (DYSPNEA ON EXERTION): ICD-10-CM

## 2024-12-18 DIAGNOSIS — R06.09 DOE (DYSPNEA ON EXERTION): Primary | ICD-10-CM

## 2024-12-18 DIAGNOSIS — J44.9 CHRONIC OBSTRUCTIVE PULMONARY DISEASE, UNSPECIFIED COPD TYPE: ICD-10-CM

## 2024-12-18 DIAGNOSIS — I27.20 PULMONARY HYPERTENSION: ICD-10-CM

## 2024-12-18 PROCEDURE — 71250 CT THORAX DX C-: CPT | Mod: 26,,, | Performed by: RADIOLOGY

## 2024-12-18 PROCEDURE — 71250 CT THORAX DX C-: CPT | Mod: TC

## 2024-12-18 NOTE — TELEPHONE ENCOUNTER
She had CT of the chest, which showed the pulm artery is more dilated. Per radiologist can be seen in pulmonary HTN. Was going to proceed w/ d dimer and possibly CTA but pt is consistent with eliquis BID and along w/ gradual chronicity of her SOB/TRUJILLO, PE unlikely. Proceed w/ TTE.

## 2024-12-18 NOTE — TELEPHONE ENCOUNTER
Spoke w/ pt about her labs and CT scan. Took lasix yesterday and started trelegy. Reports was able to sleep and not coughing as much. Worked well.

## 2024-12-19 ENCOUNTER — OFFICE VISIT (OUTPATIENT)
Dept: PRIMARY CARE CLINIC | Facility: CLINIC | Age: 69
End: 2024-12-19
Payer: MEDICARE

## 2024-12-19 VITALS
WEIGHT: 154.44 LBS | BODY MASS INDEX: 22.87 KG/M2 | TEMPERATURE: 98 F | SYSTOLIC BLOOD PRESSURE: 108 MMHG | OXYGEN SATURATION: 94 % | HEIGHT: 69 IN | HEART RATE: 94 BPM | DIASTOLIC BLOOD PRESSURE: 72 MMHG

## 2024-12-19 DIAGNOSIS — I27.20 PULMONARY HYPERTENSION: ICD-10-CM

## 2024-12-19 DIAGNOSIS — I50.33 ACUTE ON CHRONIC HEART FAILURE WITH PRESERVED EJECTION FRACTION: Primary | ICD-10-CM

## 2024-12-19 DIAGNOSIS — F51.01 PRIMARY INSOMNIA: ICD-10-CM

## 2024-12-19 DIAGNOSIS — J44.9 CHRONIC OBSTRUCTIVE PULMONARY DISEASE, UNSPECIFIED COPD TYPE: ICD-10-CM

## 2024-12-19 PROCEDURE — 99214 OFFICE O/P EST MOD 30 MIN: CPT | Mod: S$PBB,,, | Performed by: INTERNAL MEDICINE

## 2024-12-19 PROCEDURE — 99214 OFFICE O/P EST MOD 30 MIN: CPT | Mod: PBBFAC,PN | Performed by: INTERNAL MEDICINE

## 2024-12-19 PROCEDURE — 99999 PR PBB SHADOW E&M-EST. PATIENT-LVL IV: CPT | Mod: PBBFAC,,, | Performed by: INTERNAL MEDICINE

## 2024-12-19 NOTE — Clinical Note
Saw Ms. Collins who is having what sounds like orthopnea (whenever she lies down, she coughs and is SOB) for the past 2 months. CT chest w/ dilated pulm arteries. Baseline COPD - changed from anoro to trelegy. Did lasix daily for a few days. Started on jardiance 10mg daily today. Ordering a sleep study. Has echo scheduled tomorrow. Do you need to see her sooner? Pinky

## 2024-12-19 NOTE — PROGRESS NOTES
"Subjective:       Patient ID: Karina Thomas is a 69 y.o. female.    Chief Complaint: Follow-up    Follow-up  Associated symptoms include neck pain. Pertinent negatives include no arthralgias, chest pain, headaches, joint swelling, vomiting or weakness.   Started on lasix 20mg daily. Drinks a lot of water. Lost 2lbs in the last 2 days. Not peeing a whole lot. Slightly better w/ the breathing but still coughing w/ SOB when laying down and has to sleep on an incline.  Has baseline insomnia.     COPD and changed over to trelegy 2 days ago. Feels that this is helping more than the anoro ellipta.    Review of Systems   Constitutional:  Negative for activity change and unexpected weight change.   HENT:  Negative for hearing loss, rhinorrhea and trouble swallowing.    Eyes:  Negative for discharge and visual disturbance.   Respiratory:  Positive for wheezing. Negative for chest tightness.    Cardiovascular:  Negative for chest pain and palpitations.   Gastrointestinal:  Negative for blood in stool, constipation, diarrhea and vomiting.   Endocrine: Negative for polydipsia and polyuria.   Genitourinary:  Negative for difficulty urinating, dysuria, hematuria and menstrual problem.   Musculoskeletal:  Positive for neck pain. Negative for arthralgias and joint swelling.   Neurological:  Negative for weakness and headaches.   Psychiatric/Behavioral:  Negative for confusion and dysphoric mood.          Objective:      Physical Exam    /72   Pulse 94   Temp 97.6 °F (36.4 °C) (Oral)   Ht 5' 9" (1.753 m)   Wt 70.1 kg (154 lb 6.9 oz)   SpO2 (!) 94%   BMI 22.81 kg/m²   Ambulating O2 today went down to 93%.    GEN - A+OX4, NAD   HEENT - PERRL, EOMI, OP clear. MMM. TM normal.   Neck - No cervical LAD.   CV - RRR, no m/r   Chest - distant heart sounds. CTAB, no wheezing or rhonchi. Normal work of breathing.   Abd - S/NT/ND/+BS.   Ext - 2+BDP and radial pulses. No LE edema.   Neuro - 5/5 BUE and BLE strength.  MSK - normal gait. "   Skin - No rash. Dry skin.     Previous labs reviewed.        Assessment/Plan     Karina was seen today for follow-up.    Diagnoses and all orders for this visit:    Acute on chronic heart failure with preserved ejection fraction - cont daily lasix till f/u. Improvement in symptoms w/ lasix and changing anoro to trelegy. Has TTE scheduled tomorrow.   -     empagliflozin (JARDIANCE) 10 mg tablet; Take 1 tablet (10 mg total) by mouth once daily.  -     Home Sleep Study; Future    Pulmonary hypertension - CT consistent w/ pulm HTN. Home sleep study to r/o sleep apnea. Does have baseline COPD so that may be the reason for the HTN. Recently quit smoking.  -     empagliflozin (JARDIANCE) 10 mg tablet; Take 1 tablet (10 mg total) by mouth once daily.  -     Home Sleep Study; Future    Primary insomnia  -     Home Sleep Study; Future    Chronic obstructive pulmonary disease, unspecified COPD type - cont trelegy. Recently quit smoking. Good job!    Will send message to Dr. García as an update.    Follow up in about 4 days (around 12/23/2024). Phone visit.      Pinky Reyes MD  Department of Internal Medicine - [facility]  11:47 AM

## 2024-12-20 ENCOUNTER — HOSPITAL ENCOUNTER (OUTPATIENT)
Dept: CARDIOLOGY | Facility: HOSPITAL | Age: 69
Discharge: HOME | End: 2024-12-20
Attending: INTERNAL MEDICINE
Payer: MEDICARE

## 2024-12-20 ENCOUNTER — PATIENT MESSAGE (OUTPATIENT)
Dept: PRIMARY CARE CLINIC | Facility: CLINIC | Age: 69
End: 2024-12-20
Payer: MEDICARE

## 2024-12-20 VITALS
SYSTOLIC BLOOD PRESSURE: 108 MMHG | BODY MASS INDEX: 23.11 KG/M2 | HEIGHT: 69 IN | WEIGHT: 156 LBS | DIASTOLIC BLOOD PRESSURE: 72 MMHG | HEART RATE: 71 BPM

## 2024-12-20 DIAGNOSIS — R06.09 DOE (DYSPNEA ON EXERTION): ICD-10-CM

## 2024-12-20 LAB
ASCENDING AORTA: 3.03 CM
AV AREA BY CONTINUOUS VTI: 2.9 CM2
AV INDEX (PROSTH): 0.89
AV LVOT MEAN GRADIENT: 3 MMHG
AV LVOT PEAK GRADIENT: 6 MMHG
AV MEAN GRADIENT: 4.1 MMHG
AV PEAK GRADIENT: 9 MMHG
AV VALVE AREA BY VELOCITY RATIO: 2.5 CM²
AV VALVE AREA: 2.8 CM2
AV VELOCITY RATIO: 0.8
BSA FOR ECHO PROCEDURE: 1.86 M2
CV ECHO LV RWT: 0.36 CM
DOP CALC AO PEAK VEL: 1.5 M/S
DOP CALC AO VTI: 26.2 CM
DOP CALC LVOT AREA: 3.1 CM2
DOP CALC LVOT DIAMETER: 2 CM
DOP CALC LVOT PEAK VEL: 1.2 M/S
DOP CALC LVOT STROKE VOLUME: 73.2 CM3
DOP CALC RVOT AREA: 3.63 CM2
DOP CALC RVOT DIAMETER: 2.15 CM
DOP CALCLVOT PEAK VEL VTI: 23.3 CM
E WAVE DECELERATION TIME: 268.41 MS
E/A RATIO: 0.72
E/E' RATIO: 6.4 M/S
ECHO EF ESTIMATED: 58 %
ECHO LV POSTERIOR WALL: 0.8 CM (ref 0.6–1.1)
FRACTIONAL SHORTENING: 31.1 % (ref 28–44)
HR MV ECHO: 71 BPM
INTERVENTRICULAR SEPTUM: 0.8 CM (ref 0.6–1.1)
IVC DIAMETER: 1.12 CM
IVRT: 79.92 MS
LA MAJOR: 4.81 CM
LA MINOR: 4.9 CM
LA WIDTH: 3.66 CM
LEFT ATRIUM SIZE: 3.28 CM
LEFT ATRIUM VOLUME INDEX MOD: 19.9 ML/M2
LEFT ATRIUM VOLUME INDEX: 26.6 ML/M2
LEFT ATRIUM VOLUME MOD: 36.99 ML
LEFT ATRIUM VOLUME: 49.54 CM3
LEFT INTERNAL DIMENSION IN SYSTOLE: 3.1 CM (ref 2.1–4)
LEFT VENTRICLE DIASTOLIC VOLUME INDEX: 49.19 ML/M2
LEFT VENTRICLE DIASTOLIC VOLUME: 91.49 ML
LEFT VENTRICLE MASS INDEX: 61.1 G/M2
LEFT VENTRICLE SYSTOLIC VOLUME INDEX: 20.6 ML/M2
LEFT VENTRICLE SYSTOLIC VOLUME: 38.33 ML
LEFT VENTRICULAR INTERNAL DIMENSION IN DIASTOLE: 4.5 CM (ref 3.5–6)
LEFT VENTRICULAR MASS: 113.6 G
LV LATERAL E/E' RATIO: 6
LV SEPTAL E/E' RATIO: 6.86
MV A" WAVE DURATION": 85.63 MS
MV PEAK A VEL: 0.67 M/S
MV PEAK E VEL: 0.48 M/S
OHS CV RV/LV RATIO: 0.82 CM
OHS LV EJECTION FRACTION SIMPSONS BIPLANE MOD: 63 %
PISA TR MAX VEL: 2.95 M/S
PULM VEIN A" WAVE DURATION": 85.63 MS
PULM VEIN S/D RATIO: 1.43
PULMONIC VEIN PEAK A VELOCITY: 1.2 M/S
PV PEAK D VEL: 0.58 M/S
PV PEAK S VEL: 0.83 M/S
RA MAJOR: 4.79 CM
RA PRESSURE ESTIMATED: 3 MMHG
RA WIDTH: 3.86 CM
RIGHT ATRIAL AREA: 14.6 CM2
RIGHT VENTRICLE DIASTOLIC BASEL DIMENSION: 3.7 CM
RV TB RVSP: 6 MMHG
RV TISSUE DOPPLER FREE WALL SYSTOLIC VELOCITY 1 (APICAL 4 CHAMBER VIEW): 17.24 CM/S
SINUS: 3.5 CM
STJ: 2.71 CM
TDI LATERAL: 0.08 M/S
TDI SEPTAL: 0.07 M/S
TDI: 0.08 M/S
TR MAX PG: 35 MMHG
TRICUSPID ANNULAR PLANE SYSTOLIC EXCURSION: 2.05 CM
TV PEAK GRADIENT: 35 MMHG
TV REST PULMONARY ARTERY PRESSURE: 38 MMHG
Z-SCORE OF LEFT VENTRICULAR DIMENSION IN END DIASTOLE: -1.4
Z-SCORE OF LEFT VENTRICULAR DIMENSION IN END SYSTOLE: -0.23

## 2024-12-20 PROCEDURE — 93306 TTE W/DOPPLER COMPLETE: CPT

## 2024-12-20 PROCEDURE — 93306 TTE W/DOPPLER COMPLETE: CPT | Mod: 26,,, | Performed by: INTERNAL MEDICINE

## 2024-12-23 ENCOUNTER — TELEPHONE (OUTPATIENT)
Dept: PRIMARY CARE CLINIC | Facility: CLINIC | Age: 69
End: 2024-12-23

## 2024-12-23 ENCOUNTER — PATIENT MESSAGE (OUTPATIENT)
Dept: PRIMARY CARE CLINIC | Facility: CLINIC | Age: 69
End: 2024-12-23

## 2024-12-23 ENCOUNTER — TELEPHONE (OUTPATIENT)
Dept: SLEEP MEDICINE | Facility: OTHER | Age: 69
End: 2024-12-23
Payer: MEDICARE

## 2024-12-23 ENCOUNTER — OFFICE VISIT (OUTPATIENT)
Dept: PRIMARY CARE CLINIC | Facility: CLINIC | Age: 69
End: 2024-12-23
Payer: MEDICARE

## 2024-12-23 DIAGNOSIS — I50.32 CHRONIC HEART FAILURE WITH PRESERVED EJECTION FRACTION: ICD-10-CM

## 2024-12-23 DIAGNOSIS — J44.9 CHRONIC OBSTRUCTIVE PULMONARY DISEASE, UNSPECIFIED COPD TYPE: Primary | ICD-10-CM

## 2024-12-23 DIAGNOSIS — I27.20 PULMONARY HYPERTENSION: Primary | ICD-10-CM

## 2024-12-23 DIAGNOSIS — J44.9 CHRONIC OBSTRUCTIVE PULMONARY DISEASE, UNSPECIFIED COPD TYPE: ICD-10-CM

## 2024-12-23 PROCEDURE — 99499 UNLISTED E&M SERVICE: CPT | Mod: 95,,, | Performed by: INTERNAL MEDICINE

## 2024-12-23 RX ORDER — IPRATROPIUM BROMIDE AND ALBUTEROL SULFATE 2.5; .5 MG/3ML; MG/3ML
3 SOLUTION RESPIRATORY (INHALATION) EVERY 6 HOURS PRN
Qty: 75 ML | Refills: 0 | Status: SHIPPED | OUTPATIENT
Start: 2024-12-23 | End: 2024-12-24 | Stop reason: SDUPTHER

## 2024-12-23 NOTE — TELEPHONE ENCOUNTER
----- Message from Pinky Reyes MD sent at 12/23/2024 12:26 PM CST -----  4 week f/u. Sent in albuterol nebs. B, can you find a youtube video for accurate albuterol use technique and send it to her via portal? Referred to pulm.

## 2024-12-23 NOTE — Clinical Note
4 week f/u. Sent in albuterol nebs. B, can you find a youtube video for accurate albuterol use technique and send it to her via portal? Referred to pulm.

## 2024-12-23 NOTE — PROGRESS NOTES
Established Patient - Audio Only Telehealth Visit     The patient location is: [place], [place]  The chief complaint leading to consultation is: sob  Visit type: Virtual visit with audio only (telephone)  Total time spent with patient: 15 min       The reason for the audio only service rather than synchronous audio and video virtual visit was related to technical difficulties or patient preference/necessity.     Each patient to whom I provide medical services by telemedicine is:  (1) informed of the relationship between the physician and patient and the respective role of any other health care provider with respect to management of the patient; and (2) notified that they may decline to receive medical services by telemedicine and may withdraw from such care at any time. Patient verbally consented to receive this service via voice-only telephone call.       HPI:   Progressively worsened TRUJILLO/orthopnea. Stopped smoking a few weeks ago.  Changed anoro to trelegy. S/p lasix x a few days (off now). Some symptom relief but not resolution.   Still has to sleep on 3 pillows but able to sleep for a few hours at a time. Would wake up SOB. On trelegy daily. Using albuterol about twice a day. At our last visit, started Jardiance 10mg daily.     PFT 9/30/24  Spirometry shows moderate obstruction. Lung volume determination shows TLC is normal with evidence air trapping is present. Spirometry remains unimproved following bronchodilator. Flow volume loops show scooping consistent with obstruction. DLCO is   moderately decreased.   Â   Notes:   Â   The failure to demonstrate improvement in spirometry does not preclude a clinical response to a trial of bronchodilators. No recent hemoglobin value available. DLCO interpretation assumes a normal hemoglobin value.     Ct CHEST 12/18/24 -   Impression:  1. No acute cardiopulmonary disease.  2. Emphysema.  3. Dilation of the main pulmonary artery up to 4 cm, which can be seen in pulmonary  hypertension.  The pulmonary is more dilated when compared to 06/22/2021.  4. Severe coronary artery calcifications    TTE 12/20/24    Left Ventricle: The left ventricle is normal in size. Normal wall thickness. There is normal systolic function with a visually estimated ejection fraction of 60 - 65%. Quantitated ejection fraction is 63%. Grade I diastolic dysfunction.    Right Ventricle: Normal right ventricular cavity size. Systolic function is normal.    Pulmonary Artery: The estimated pulmonary artery systolic pressure is 38 mmHg.    IVC/SVC: Normal venous pressure at 3 mmHg.     Assessment and plan:    Diagnoses and all orders for this visit:    Pulmonary hypertension  -     Ambulatory referral/consult to Pulmonology; Future    Chronic obstructive pulmonary disease, unspecified COPD type  -     Ambulatory referral/consult to Pulmonology; Future    Chronic heart failure with preserved ejection fraction    Cont jardiance 10mg daily. Referral to pulm for pulm HTN. Proceed w/ home sleep study. Will rx albuterol nebs     4 week f/u, sooner if needed.     Pinky Reyes MD  Department of Internal Medicine - [facility]  12:25 PM         This service was not originating from a related E/M service provided within the previous 7 days nor will  to an E/M service or procedure within the next 24 hours or my soonest available appointment.  Prevailing standard of care was able to be met in this audio-only visit.

## 2024-12-24 ENCOUNTER — E-CONSULT (OUTPATIENT)
Dept: PHARMACY | Facility: CLINIC | Age: 69
End: 2024-12-24
Payer: MEDICARE

## 2024-12-24 ENCOUNTER — PATIENT MESSAGE (OUTPATIENT)
Dept: PRIMARY CARE CLINIC | Facility: CLINIC | Age: 69
End: 2024-12-24
Payer: MEDICARE

## 2024-12-24 DIAGNOSIS — Z71.89 OTHER SPECIFIED COUNSELING: Primary | ICD-10-CM

## 2024-12-24 DIAGNOSIS — J44.9 CHRONIC OBSTRUCTIVE PULMONARY DISEASE, UNSPECIFIED COPD TYPE: ICD-10-CM

## 2024-12-24 RX ORDER — IPRATROPIUM BROMIDE AND ALBUTEROL SULFATE 2.5; .5 MG/3ML; MG/3ML
3 SOLUTION RESPIRATORY (INHALATION) EVERY 6 HOURS PRN
Qty: 75 ML | Refills: 0 | Status: SHIPPED | OUTPATIENT
Start: 2024-12-24 | End: 2025-12-24

## 2024-12-24 NOTE — CONSULTS
[facility]  Response for E-Consult     Patient Name: Karina Thomas  MRN: [id number]  Primary Care Provider: Pinky Reyes MD   Requesting Provider: Pinky Reyes MD  E-Consult to Pharmacy  Consult performed by: Tanya Rodriguez PharmD  Consult ordered by: Pinky Reyes MD  Reason for consult: Cost savings/formulary question  Assessment/Recommendations: Script was denied via Medicare part D. Medication needs to be submitted by Medicare Part B and may require a CMS form in order for the nebulizer machine and ampules to be covered by insurance.    CMS form: https://www.cms.gov/Medicare/CMS-Forms/CMS-Forms/downloads/cms1490s-english.pdf          Recommendation: Medication needs to be submitted by Medicare Part B and may require a CMS form in order for the nebulizer machine and ampules to be covered by insurance.    CMS form: https://www.cms.gov/Medicare/CMS-Forms/CMS-Forms/downloads/cms1490s-english.pdf    Contingency that warrants a repeat eConsult or referral: n/a    Total time of Consultation: 15 minute    I did not speak to the requesting provider verbally about this.     *This eConsult is based on the clinical data available to me and is furnished without benefit of a physical examination. The eConsult will need to be interpreted in light of any clinical issues or changes in patient status not available to me at the time of filing this eConsults. Significant changes in patient condition or level of acuity should result in immediate formal consultation and reevaluation. Please alert me if you have further questions.    Thank you for this eConsult referral.     Tanya Rodriguez, Anthony  [facility]

## 2024-12-26 ENCOUNTER — TELEPHONE (OUTPATIENT)
Dept: PRIMARY CARE CLINIC | Facility: CLINIC | Age: 69
End: 2024-12-26
Payer: MEDICARE

## 2024-12-26 ENCOUNTER — PATIENT MESSAGE (OUTPATIENT)
Dept: PRIMARY CARE CLINIC | Facility: CLINIC | Age: 69
End: 2024-12-26
Payer: MEDICARE

## 2024-12-26 DIAGNOSIS — R06.2 WHEEZING: ICD-10-CM

## 2024-12-26 DIAGNOSIS — J44.9 CHRONIC OBSTRUCTIVE PULMONARY DISEASE, UNSPECIFIED COPD TYPE: ICD-10-CM

## 2024-12-26 NOTE — TELEPHONE ENCOUNTER
RN spoke to pt about inhaler and nebulizers.  RN completed forms for Medicare to cover nebs and nebulizer machine.

## 2024-12-27 RX ORDER — ALBUTEROL SULFATE 90 UG/1
2 INHALANT RESPIRATORY (INHALATION) EVERY 4 HOURS PRN
Qty: 54 G | Refills: 3 | Status: SHIPPED | OUTPATIENT
Start: 2024-12-27 | End: 2025-03-27

## 2025-01-03 NOTE — PROGRESS NOTES
HISTORY:    68-year-old female with a history of paroxysmal atrial fibrillation, hyperlipidemia, aortic atherosclerosis, +tob, GERD, and anxiety presenting for follow-up.    Patient had a recent admission for UTI status post treatment.  Noted to be in atrial fibrillation on presentation to the ED. Only clinical event. Converted to normal sinus rhythm spontaneously.     The patient denies any symptoms of chest pain, shortness of breath, or dyspnea on exertion. No palpitations.     Activity levels at baseline are moderate. Pt stays active during the days without dedicated exercise. Completes ADLs and cares for her own house. Helps with care of grandchildren.     The patient denies any previous history of myocardial infarction, coronary artery disease, peripheral arterial disease, stroke, congestive heart failure, or cardiomyopathy.    She currently tolerates metoprolol 25 x 2, apixiban 5x2, and rosuvastatin 20 x 1. Pt does believe the rosuvastatin is making her fatigued. Was previously on pravastatin.     PHYSICAL EXAM:    Vitals:    05/31/23 1335   BP: (!) 145/87   Pulse: 78       NAD, A+Ox3.  No jvd, no bruit.  RRR nml s1,s2. No murmurs.  CTA B no wheezes or crackles.  No edema.    LABS/STUDIES (imaging reviewed during clinic visit):    May 2023 hemoglobin 14.0.  Creatinine 0.6 with a BUN of 5.  Albumin 3.9.   and HDL 93.  Triglycerides 74.  Although renin ratio 26.4.  Normal epi/norepi.  Normal metanephrine/normetanephrine.  A1c normal.    ECG May 2023 one ecg with sinus rhythm with no Q-waves and non-specific Sts after an ecg with afib.  Holter May 2023 demonstrates sinus rhythm with average heart rate 83 beats per minute.  PACs and PVCs noted with a less than 1% burden.  No evidence of atrial fibrillation.  TTE May 2023 normal LV size with concentric remodeling and an EF of 50 55%.  Normal diastology.  CVP 3.    BASSAM 2020 to 2 minutes and 44 seconds on a medium ramp protocol.  No evidence of ischemia with  normal TTE at baseline.    Chest x-ray May 2023 clear with a normal heart size.  Aortic arch calcification noted.    Arterial duplex 2023 no evidence of significant stenosis bilaterally.  CT abdomen pelvis 2023 aortic calcification.      ASSESSMENT & PLAN:    1. Aortic atherosclerosis    2. Hyperlipidemia, unspecified hyperlipidemia type    3. Hypertension, unspecified type    4. Paroxysmal atrial fibrillation    5. Tobacco use                  Patient with paroxysmal atrial fibrillation that is newly recognized. No symptoms. Recently started on metoprolol. Chadsvasc 4. On apixiban 5x2.     Bps elevated on graphic trends. Have already uptitrated metoprolol to 25x2. Start home BP log.    Pt does note fatigue and achiness. Changed pravastatin 80 to rosuvastatin 20x1. Okay to hold rosuvastatin.    Patient understands the benefits in quitting tob.    Follow up in about 3 months (around 8/31/2023).      Rossana García MD     General

## 2025-01-09 ENCOUNTER — TELEPHONE (OUTPATIENT)
Dept: PHARMACY | Facility: CLINIC | Age: 70
End: 2025-01-09
Payer: MEDICARE

## 2025-01-09 NOTE — TELEPHONE ENCOUNTER
We have reviewed Ms. Thomas current medication list and/or insurance status. Unfortunately, The Pharmacy Patient Assistance Team is unable to assist Trelegy at this time due to the following reasons      Patient must demonstrate they are Low Income Subsidy(LIS)/Extra Help ineligible before applying to the requested program. Patient advised to contact [email or web address] for enrollment details           Next Steps:  .  If denied with Low Income subsidy patient is required to provide the denial letter and  Documentation of $600 out-of-pocket prescription expenses not met     Patient has been notified of this decision via letter and portal message         Bernadette Mondragon  Pharmacy Patient Assistance Team

## 2025-01-09 NOTE — LETTER
January 9, 2025    Karina Martha  [address] [place] [address]         Dear Ms. Thomas,      My name is Bernadette Mondragon.  I am reaching out on behalf of [facility]s Pharmacy Patient Assistance Team after receiving a referral from your Provider inquiring about assistance with your medication.  We have reviewed your current medication list and/or insurance status. Unfortunately, The Pharmacy Patient Assistance Team is unable to assist at this time due to the following reasons      Patient must demonstrate they are Low Income Subsidy(LIS)/Extra Help ineligible before applying to the requested program. Patient advised to contact [email or web address] for enrollment details      Next Steps:  .  If denied with Low Income subsidy patient is required to provide the denial letter and  Documentation of $600 out-of-pocket prescription expenses not met         Sincerely,   Gutierrez SY @[phone]  Pharmacy Patient Assistance  [address] [place] [address]  [address] [place] [address]  Fax: [phone]  Email: [email or web address]

## 2025-01-13 DIAGNOSIS — Z00.00 ENCOUNTER FOR MEDICARE ANNUAL WELLNESS EXAM: ICD-10-CM

## 2025-01-14 ENCOUNTER — OFFICE VISIT (OUTPATIENT)
Dept: PRIMARY CARE CLINIC | Facility: CLINIC | Age: 70
End: 2025-01-14
Payer: MEDICARE

## 2025-01-14 VITALS
HEART RATE: 75 BPM | DIASTOLIC BLOOD PRESSURE: 75 MMHG | BODY MASS INDEX: 23.18 KG/M2 | OXYGEN SATURATION: 95 % | SYSTOLIC BLOOD PRESSURE: 115 MMHG | WEIGHT: 156.94 LBS

## 2025-01-14 DIAGNOSIS — J43.2 CENTRILOBULAR EMPHYSEMA: ICD-10-CM

## 2025-01-14 DIAGNOSIS — F41.9 ANXIETY: Primary | ICD-10-CM

## 2025-01-14 DIAGNOSIS — I48.0 PAROXYSMAL ATRIAL FIBRILLATION: ICD-10-CM

## 2025-01-14 DIAGNOSIS — I70.0 AORTIC ATHEROSCLEROSIS: ICD-10-CM

## 2025-01-14 DIAGNOSIS — I27.20 PULMONARY HYPERTENSION: ICD-10-CM

## 2025-01-14 PROCEDURE — 99213 OFFICE O/P EST LOW 20 MIN: CPT | Mod: PBBFAC,PN | Performed by: PHYSICIAN ASSISTANT

## 2025-01-14 PROCEDURE — 99999 PR PBB SHADOW E&M-EST. PATIENT-LVL III: CPT | Mod: PBBFAC,,, | Performed by: PHYSICIAN ASSISTANT

## 2025-01-14 PROCEDURE — 99214 OFFICE O/P EST MOD 30 MIN: CPT | Mod: S$PBB,,, | Performed by: PHYSICIAN ASSISTANT

## 2025-01-14 NOTE — PROGRESS NOTES
Primary Care Provider Appointment   [facility]        Subjective:       Patient ID:  Karina is a 69 y.o. female being seen for Anxiety      Chief Complaint: Anxiety    HPI: 70 yo female presents for anxiety follow up. She is doing well with her anxiety. Went back to work. She takes ativan as needed which is not often.     She is doing well with trelegy, rarely needs albuterol. Scheduled to see pulm for pulm htn.   BP well controlled. Breathing is stable. She has stopped smoking.    She has no concerns today    Past Medical History:   Diagnosis Date    Anxiety     GERD (gastroesophageal reflux disease)     Hyperlipidemia        Review of Systems   Constitutional:  Negative for activity change and unexpected weight change.   HENT:  Negative for hearing loss, rhinorrhea and trouble swallowing.    Eyes:  Negative for discharge and visual disturbance.   Respiratory:  Positive for wheezing. Negative for chest tightness.    Cardiovascular:  Negative for chest pain and palpitations.   Gastrointestinal:  Negative for blood in stool, constipation, diarrhea and vomiting.   Endocrine: Negative for polydipsia and polyuria.   Genitourinary:  Negative for difficulty urinating, dysuria, hematuria and menstrual problem.   Musculoskeletal:  Negative for arthralgias, joint swelling and neck pain.   Neurological:  Negative for weakness and headaches.   Psychiatric/Behavioral:  Negative for confusion and dysphoric mood.              Health Maintenance         Date Due Completion Date    RSV Vaccine (Age 60+ and Pregnant patients) (1 - Risk 60-74 years 1-dose series) Never done ---    TETANUS VACCINE 10/01/2015 10/1/2005    Shingles Vaccine (2 of 3) 08/10/2016 6/15/2016    Influenza Vaccine (1) 06/30/2025 (Originally 9/1/2024) 10/23/2020    COVID-19 Vaccine (4 - 2024-25 season) 10/23/2025 (Originally 9/1/2024) 10/26/2021    DEXA Scan 06/09/2025 6/9/2020    Colorectal Cancer Screening 08/12/2025  "8/12/2020    Override on 8/12/2020: Done    Mammogram 10/16/2025 10/16/2024    LDCT Lung Screen 12/18/2025 12/18/2024    High Dose Statin 01/14/2026 1/14/2025    Lipid Panel 02/26/2029 2/26/2024                   Objective:      Vitals:    01/14/25 1251   BP: 115/75   BP Location: Left arm   Patient Position: Sitting   Pulse: 75   SpO2: 95%   Weight: 71.2 kg (156 lb 15.5 oz)     Estimated body mass index is 23.18 kg/m² as calculated from the following:    Height as of 12/20/24: 5' 9" (1.753 m).    Weight as of this encounter: 71.2 kg (156 lb 15.5 oz).  Physical Exam      Assessment and Plan:         1. Anxiety  Overview:  CLYDE-7 score 2    Assessment & Plan:  Continue ativan prn      2. Pulmonary hypertension  Assessment & Plan:  Breathing stable. BP controlled  Will see pulm      3. Centrilobular emphysema  Overview:  - Managed with Trelegy daily and PRN JULIO CESAR  - Stopped smoking 1 month ago    Assessment & Plan:  Continue trelegy and smoking cessation      4. Paroxysmal atrial fibrillation  Assessment & Plan:  Rate controlled on metoprolol, continue  Continue eliquis      5. Aortic atherosclerosis  Assessment & Plan:  Continue statin, on eliquis           Follow Up:   F/u in 2 months        Amy Lado, PA-C [facility]     "

## 2025-01-15 PROBLEM — I27.20 PULMONARY HYPERTENSION: Status: ACTIVE | Noted: 2025-01-15

## 2025-01-26 ENCOUNTER — PATIENT MESSAGE (OUTPATIENT)
Dept: PRIMARY CARE CLINIC | Facility: CLINIC | Age: 70
End: 2025-01-26
Payer: MEDICARE

## 2025-01-26 DIAGNOSIS — J44.9 CHRONIC OBSTRUCTIVE PULMONARY DISEASE, UNSPECIFIED COPD TYPE: ICD-10-CM

## 2025-01-27 ENCOUNTER — OFFICE VISIT (OUTPATIENT)
Dept: PRIMARY CARE CLINIC | Facility: CLINIC | Age: 70
End: 2025-01-27
Payer: MEDICARE

## 2025-01-27 VITALS
TEMPERATURE: 97 F | OXYGEN SATURATION: 96 % | HEIGHT: 69 IN | BODY MASS INDEX: 23.18 KG/M2 | HEART RATE: 100 BPM | SYSTOLIC BLOOD PRESSURE: 121 MMHG | DIASTOLIC BLOOD PRESSURE: 62 MMHG

## 2025-01-27 DIAGNOSIS — J10.1 INFLUENZA A: Primary | ICD-10-CM

## 2025-01-27 DIAGNOSIS — I27.20 PULMONARY HYPERTENSION: ICD-10-CM

## 2025-01-27 DIAGNOSIS — J44.1 COPD EXACERBATION: ICD-10-CM

## 2025-01-27 PROCEDURE — 99999PBSHW PR PBB SHADOW TECHNICAL ONLY FILED TO HB: Mod: JZ,PBBFAC,,

## 2025-01-27 PROCEDURE — 99999 PR PBB SHADOW E&M-EST. PATIENT-LVL III: CPT | Mod: PBBFAC,,, | Performed by: INTERNAL MEDICINE

## 2025-01-27 PROCEDURE — 99214 OFFICE O/P EST MOD 30 MIN: CPT | Mod: S$PBB,,, | Performed by: INTERNAL MEDICINE

## 2025-01-27 PROCEDURE — 99213 OFFICE O/P EST LOW 20 MIN: CPT | Mod: PBBFAC,PN | Performed by: INTERNAL MEDICINE

## 2025-01-27 PROCEDURE — 96372 THER/PROPH/DIAG INJ SC/IM: CPT | Mod: PBBFAC,PN

## 2025-01-27 RX ORDER — PREDNISONE 20 MG/1
TABLET ORAL
Qty: 10 TABLET | Refills: 0 | Status: SHIPPED | OUTPATIENT
Start: 2025-01-27

## 2025-01-27 RX ORDER — OSELTAMIVIR PHOSPHATE 75 MG/1
75 CAPSULE ORAL 2 TIMES DAILY
Qty: 10 CAPSULE | Refills: 0 | Status: SHIPPED | OUTPATIENT
Start: 2025-01-27 | End: 2025-02-01

## 2025-01-27 RX ORDER — METHYLPREDNISOLONE SOD SUCC 125 MG
80 VIAL (EA) INJECTION
Status: DISCONTINUED | OUTPATIENT
Start: 2025-01-27 | End: 2025-01-27

## 2025-01-27 RX ORDER — METHYLPREDNISOLONE SOD SUCC 125 MG
125 VIAL (EA) INJECTION
Status: COMPLETED | OUTPATIENT
Start: 2025-01-27 | End: 2025-01-27

## 2025-01-27 RX ADMIN — METHYLPREDNISOLONE SODIUM SUCCINATE 125 MG: 125 INJECTION, POWDER, FOR SOLUTION INTRAMUSCULAR; INTRAVENOUS at 09:01

## 2025-01-27 NOTE — PROGRESS NOTES
"Subjective:       Patient ID: Karina Thomas is a 69 y.o. female.    Chief Complaint: No chief complaint on file.    HPI  Pt sent the following message yesterday of new symptoms:  "Good morning. I hope you had a nice weekend. Thank goodness we are done with the snow and frozen streets.   The corners of my mouth are cracking and I have a sore right below my lip.   I also have redness in my genitalia.   My head/nose is very congested.  My sense of taste is not good. And Im having a bit of an upset stomach.   I dont know whats going on with me, Shraddha never had so many things happen to me all at once.   The 2 recent medications, Trelegy and Jardiance, could this be causing these symptoms?"  Here for same day appt.    2 days ago, started w/ congestion that was sudden. Some chest tightness. Cough but not productive of anything. No fevers. Felt cold. Hasn't been eating anything for 2 days - not hungry. Hydrating. Cracking at the corners of B mouth. Using triple antibiotic ointment and it's going away. A little wheezing. Has been using albuterol and also nebulizer machine. Nausea but no vomiting. No diarrhea. Pain at the top of the back and also across the chest whenever coughing and taking a deep breath.   Did a COVID test this morning and was negative. No sick contacts she knows of. Exhausted walking 10 ft.   Took some ibuprofen yesterday. Hasn't taken anything else.   Reports no longer w/ redness in the genitalia area. Previously w/ some itching. No discharge.     COPD - quit smoking 12/2024.   Currently on trelegy daily.   Pulmonary HTN and diastolic HF - jardiance 10mg daily. Home sleep study ordered but not done yet.  Pulm appt 2/25/25 - Dr. Po Chamberlain.    Review of Systems  Comprehensive review of systems otherwise negative. See history/subjective section for more details.    Objective:      Physical Exam    /62   Pulse 100   Temp 97.3 °F (36.3 °C) (Oral)   Ht 5' 9" (1.753 m)   SpO2 96%   BMI 23.18 " kg/m²     GEN - A+OX4, ill appearing.   HEENT - PERRL, EOMI, OP clear. MMM. TM normal. No sinus pain on palpation.   Neck - No cervical LAD.   CV - RRR, no m/r   Chest - distant heart sounds. CTAB, no wheezing or rhonchi. Coughing and audibly wheezing.   Abd - S/NT/ND/+BS.   Ext - 2+BDP and radial pulses. No LE edema.   MSK - normal gait.   Skin - cracked corners of the mouth and also sore at the L lower lip.      Previous labs reviewed.     Assessment/Plan     Diagnoses and all orders for this visit:    Influenza A positive in clinic. COVID negative.  -     POCT Influenza A/B Rapid Antigen  -     POCT COVID-19 Rapid Screening  -     oseltamivir (TAMIFLU) 75 MG capsule; Take 1 capsule (75 mg total) by mouth 2 (two) times daily. for 5 days    COPD exacerbation  -     predniSONE (DELTASONE) 20 MG tablet; Take 2 tabs daily x 5 days started 1/28  -     Discontinue: methylPREDNISolone sodium succinate injection 80 mg  -     methylPREDNISolone sodium succinate injection 125 mg    Pulmonary hypertension  F/u w/ pulm.      Follow up in about 1 week (around 2/3/2025).      Pinky Reyes MD  Department of Internal Medicine - [facility]  9:00 AM

## 2025-01-29 ENCOUNTER — PATIENT MESSAGE (OUTPATIENT)
Dept: RESEARCH | Facility: HOSPITAL | Age: 70
End: 2025-01-29
Payer: MEDICARE

## 2025-01-29 RX ORDER — AZITHROMYCIN 250 MG/1
TABLET, FILM COATED ORAL
Qty: 6 TABLET | Refills: 0 | Status: SHIPPED | OUTPATIENT
Start: 2025-01-29 | End: 2025-02-03

## 2025-01-30 RX ORDER — IPRATROPIUM BROMIDE AND ALBUTEROL SULFATE 2.5; .5 MG/3ML; MG/3ML
3 SOLUTION RESPIRATORY (INHALATION) EVERY 6 HOURS PRN
Qty: 75 ML | Refills: 3 | Status: SHIPPED | OUTPATIENT
Start: 2025-01-30 | End: 2026-01-30

## 2025-02-03 ENCOUNTER — OFFICE VISIT (OUTPATIENT)
Dept: PRIMARY CARE CLINIC | Facility: CLINIC | Age: 70
End: 2025-02-03
Payer: MEDICARE

## 2025-02-03 DIAGNOSIS — J44.9 CHRONIC OBSTRUCTIVE PULMONARY DISEASE, UNSPECIFIED COPD TYPE: Primary | ICD-10-CM

## 2025-02-03 DIAGNOSIS — J10.1 INFLUENZA A: ICD-10-CM

## 2025-02-03 PROCEDURE — 98004 SYNCH AUDIO-VIDEO EST SF 10: CPT | Mod: 95,,, | Performed by: INTERNAL MEDICINE

## 2025-02-03 NOTE — PROGRESS NOTES
The patient location is: [place], [place]  The chief complaint leading to consultation is: flu follow up    Visit type: audiovisual    Face to Face time with patient: 10 min  12 minutes of total time spent on the encounter, which includes face to face time and non-face to face time preparing to see the patient (eg, review of tests), Obtaining and/or reviewing separately obtained history, Documenting clinical information in the electronic or other health record, Independently interpreting results (not separately reported) and communicating results to the patient/family/caregiver, or Care coordination (not separately reported).         Each patient to whom he or she provides medical services by telemedicine is:  (1) informed of the relationship between the physician and patient and the respective role of any other health care provider with respect to management of the patient; and (2) notified that he or she may decline to receive medical services by telemedicine and may withdraw from such care at any time.    Notes:     Subjective:       Patient ID: Karina Thomas is a 69 y.o. female.    HPI  Pt dx w/ flu A 1/27/25. S/p tamiflu. Exacerbated her COPD - started on prednisone. Pt sent a message a few days later and reported change in sputum color and increased sputum. Thus also sent in Z pack.   Today, pt reports feeling much better but felt terrible 4 days last week. Didn't go in to work. Today she feels fairly well - well enough to work a few hours if she had to.  Still w/ some lingering cough and head congestion/HA but overall much better. No SOB. Occ wheezing. Generalized weakness but improving.    Review of Systems   Constitutional:  Positive for activity change. Negative for unexpected weight change.   HENT:  Positive for rhinorrhea. Negative for hearing loss and trouble swallowing.    Eyes:  Negative for discharge and visual disturbance.   Respiratory:  Positive for wheezing. Negative for chest tightness.     Cardiovascular:  Negative for chest pain and palpitations.   Gastrointestinal:  Negative for blood in stool, constipation, diarrhea and vomiting.   Endocrine: Negative for polydipsia and polyuria.   Genitourinary:  Negative for difficulty urinating, dysuria, hematuria and menstrual problem.   Musculoskeletal:  Negative for arthralgias, joint swelling and neck pain.   Neurological:  Positive for weakness and headaches.   Psychiatric/Behavioral:  Negative for confusion and dysphoric mood.          Objective:     Gen - A+OX4, NAD  CHEST - completing full sentences. Normal work of breathing.     Assessment/Plan     Diagnoses and all orders for this visit:    Chronic obstructive pulmonary disease, unspecified COPD type    Influenza A    S/p tamiflu, zpack and prednisone. Feeling much better overall. Reassurance that slight lingering congestion/cough is normal. Unless symptoms worsen, can continue w/ symptomatic treatment. Continue trelegy daily and smoking cessation. F/u as previously scheduled. Ok to return to work.      Follow up if symptoms worsen or fail to improve.      Pinky Reyes MD  Department of Internal Medicine - [facility]

## 2025-02-04 ENCOUNTER — TELEPHONE (OUTPATIENT)
Dept: SLEEP MEDICINE | Facility: OTHER | Age: 70
End: 2025-02-04
Payer: MEDICARE

## 2025-02-05 ENCOUNTER — PATIENT MESSAGE (OUTPATIENT)
Dept: PRIMARY CARE CLINIC | Facility: CLINIC | Age: 70
End: 2025-02-05
Payer: MEDICARE

## 2025-02-07 ENCOUNTER — TELEPHONE (OUTPATIENT)
Dept: RESEARCH | Facility: HOSPITAL | Age: 70
End: 2025-02-07
Payer: MEDICARE

## 2025-02-07 ENCOUNTER — TELEPHONE (OUTPATIENT)
Dept: NEUROLOGY | Facility: CLINIC | Age: 70
End: 2025-02-07
Payer: MEDICARE

## 2025-02-07 NOTE — TELEPHONE ENCOUNTER
Study title: JOVANIIL SAM  IRB #: [id number]      LVM to call back at [phone] in order to discuss the study.

## 2025-02-20 ENCOUNTER — TELEPHONE (OUTPATIENT)
Dept: SLEEP MEDICINE | Facility: OTHER | Age: 70
End: 2025-02-20
Payer: MEDICARE

## 2025-02-25 ENCOUNTER — PATIENT MESSAGE (OUTPATIENT)
Dept: PRIMARY CARE CLINIC | Facility: CLINIC | Age: 70
End: 2025-02-25
Payer: MEDICARE

## 2025-02-25 ENCOUNTER — OFFICE VISIT (OUTPATIENT)
Dept: PULMONOLOGY | Facility: CLINIC | Age: 70
End: 2025-02-25
Payer: MEDICARE

## 2025-02-25 VITALS
HEART RATE: 68 BPM | DIASTOLIC BLOOD PRESSURE: 83 MMHG | SYSTOLIC BLOOD PRESSURE: 122 MMHG | BODY MASS INDEX: 23.11 KG/M2 | WEIGHT: 156.5 LBS | OXYGEN SATURATION: 97 %

## 2025-02-25 DIAGNOSIS — J43.2 CENTRILOBULAR EMPHYSEMA: ICD-10-CM

## 2025-02-25 DIAGNOSIS — K59.00 CONSTIPATION, UNSPECIFIED CONSTIPATION TYPE: Primary | ICD-10-CM

## 2025-02-25 DIAGNOSIS — Z87.891 FORMER CIGARETTE SMOKER: Primary | ICD-10-CM

## 2025-02-25 DIAGNOSIS — I27.20 PULMONARY HYPERTENSION: ICD-10-CM

## 2025-02-25 DIAGNOSIS — J44.9 CHRONIC OBSTRUCTIVE PULMONARY DISEASE, UNSPECIFIED COPD TYPE: ICD-10-CM

## 2025-02-25 PROCEDURE — 99999 PR PBB SHADOW E&M-EST. PATIENT-LVL IV: CPT | Mod: PBBFAC,,, | Performed by: INTERNAL MEDICINE

## 2025-02-25 PROCEDURE — 99204 OFFICE O/P NEW MOD 45 MIN: CPT | Mod: S$PBB,,, | Performed by: INTERNAL MEDICINE

## 2025-02-25 PROCEDURE — G2211 COMPLEX E/M VISIT ADD ON: HCPCS | Mod: S$PBB,,, | Performed by: INTERNAL MEDICINE

## 2025-02-25 PROCEDURE — 99214 OFFICE O/P EST MOD 30 MIN: CPT | Mod: PBBFAC | Performed by: INTERNAL MEDICINE

## 2025-02-25 RX ORDER — ALBUTEROL SULFATE 0.63 MG/3ML
0.63 SOLUTION RESPIRATORY (INHALATION) EVERY 6 HOURS PRN
Qty: 360 ML | Refills: 11 | Status: SHIPPED | OUTPATIENT
Start: 2025-02-25

## 2025-02-25 NOTE — ASSESSMENT & PLAN NOTE
Echo with PASP mildly elevated. Suspect all Group 3. Check for hypoxia with 6MWD. Check for TANYA with home sleep study.  -repeat Echo in Dec to reassess PASP.

## 2025-02-25 NOTE — ASSESSMENT & PLAN NOTE
Significant chronic condition to be followed longitudinally with following long term treatment plan.    Moderate COPD on PFTs. Continue Trelegy 100; PRN Albuterol MDI.  -Nebs for emergency only. Avoid Ipratropium as competitively inhibits LAMA   -check 6MWD  -Eos 800

## 2025-02-25 NOTE — TELEPHONE ENCOUNTER
Sent in lactulose daily prn constipation. Stay hydrated. Alternative can try milk of magnesia OTC but not both together.

## 2025-02-25 NOTE — PROGRESS NOTES
Subjective:       Patient ID: Karina Thomas is a 69 y.o. female.    Chief Complaint: COPD    68 yo female with former smoking, COPD, HTN who presents to establish with pulmonary . She reports her breathing is doing much better since starting Trelegy. She has still been using a neb treatment about once a day. Went over roles of inhalers and neb treatments and medication interactions. Currently has exercise limitations from an old knee injury but none from dyspnea. Remains active. Quit smoking essentially in December. Has not yet scheduled sleep study ordered by PCP. No other questions or complaints.    COPD  Review of Systems      Past Medical History[1]     Family History   Problem Relation Name Age of Onset    Cancer Father          unknown CA    Alzheimer's disease Mother      Heart disease Brother          cad s/p stent 64    No Known Problems Daughter      No Known Problems Son      Breast cancer Neg Hx      Colon cancer Neg Hx      Ovarian cancer Neg Hx        If not mentioned in HPI, Family history is reviewed and not contributory    Social History[2]     Objective:        Vitals:    02/25/25 0848   BP: 122/83   Pulse: 68     Wt Readings from Last 3 Encounters:   02/25/25 71 kg (156 lb 8.4 oz)   01/14/25 71.2 kg (156 lb 15.5 oz)   12/20/24 70.8 kg (156 lb)     Temp Readings from Last 3 Encounters:   01/27/25 97.3 °F (36.3 °C) (Oral)   12/19/24 97.6 °F (36.4 °C) (Oral)   12/17/24 97.7 °F (36.5 °C) (Oral)     BP Readings from Last 3 Encounters:   02/25/25 122/83   01/27/25 121/62   01/14/25 115/75     Pulse Readings from Last 3 Encounters:   02/25/25 68   01/27/25 100   01/14/25 75       Physical Exam   Constitutional: She is oriented to person, place, and time. She appears well-developed and well-nourished.   HENT:   Head: Normocephalic.   Mouth/Throat: Oropharynx is clear and moist.   Cardiovascular: Normal rate, regular rhythm and normal heart sounds.   Pulmonary/Chest: Normal expansion, symmetric  chest wall expansion, effort normal and breath sounds normal. She has no wheezes.   Abdominal: Soft. She exhibits no distension.   Musculoskeletal:         General: No edema. Normal range of motion.      Cervical back: Neck supple.   Lymphadenopathy:     She has no cervical adenopathy.   Neurological: She is alert and oriented to person, place, and time. Gait normal.   Skin: Skin is warm and dry. No rash noted.   Psychiatric: She has a normal mood and affect. Her behavior is normal. Judgment and thought content normal.   Vitals reviewed.    CBC  Lab Results   Component Value Date    WBC 8.32 12/18/2024    HGB 15.2 12/18/2024    HCT 45.6 12/18/2024    MCV 96 12/18/2024     12/18/2024         CMP  Sodium   Date Value Ref Range Status   12/18/2024 136 136 - 145 mmol/L Final     Potassium   Date Value Ref Range Status   12/18/2024 3.6 3.5 - 5.1 mmol/L Final     Chloride   Date Value Ref Range Status   12/18/2024 102 95 - 110 mmol/L Final     CO2   Date Value Ref Range Status   12/18/2024 25 23 - 29 mmol/L Final     Glucose   Date Value Ref Range Status   12/18/2024 101 70 - 110 mg/dL Final     BUN   Date Value Ref Range Status   12/18/2024 4 (L) 8 - 23 mg/dL Final     Creatinine   Date Value Ref Range Status   12/18/2024 0.7 0.5 - 1.4 mg/dL Final     Calcium   Date Value Ref Range Status   12/18/2024 9.3 8.7 - 10.5 mg/dL Final     Total Protein   Date Value Ref Range Status   12/18/2024 6.8 6.0 - 8.4 g/dL Final     Albumin   Date Value Ref Range Status   12/18/2024 4.1 3.5 - 5.2 g/dL Final     Total Bilirubin   Date Value Ref Range Status   12/18/2024 0.5 0.1 - 1.0 mg/dL Final     Comment:     For infants and newborns, interpretation of results should be based  on gestational age, weight and in agreement with clinical  observations.    Premature Infant recommended reference ranges:  Up to 24 hours.............<8.0 mg/dL  Up to 48 hours............<12.0 mg/dL  3-5 days..................<15.0 mg/dL  6-29  "days.................<15.0 mg/dL       Alkaline Phosphatase   Date Value Ref Range Status   12/18/2024 51 40 - 150 U/L Final     AST   Date Value Ref Range Status   12/18/2024 25 10 - 40 U/L Final     ALT   Date Value Ref Range Status   12/18/2024 24 10 - 44 U/L Final     Anion Gap   Date Value Ref Range Status   12/18/2024 9 8 - 16 mmol/L Final     eGFR   Date Value Ref Range Status   12/18/2024 >60.0 >60 mL/min/1.73 m^2 Final       ABG  No results found for: "PH", "PO2", "PCO2"        Personal Diagnostic Review  I have personally reviewed the following data and added my own interpretation as below:  CT Chest 12/18/24 images personally reviewed and shows mild emphysema, no nodule or lesions, mildly dilated pulmonary arteries  PFTs- mod obst, air trapping  Echo with PASP 38  CBC with Eos 800, Hgb 15.2      2/25/2025     8:48 AM 1/27/2025     9:33 AM 1/14/2025    12:51 PM 12/20/2024     1:56 PM 12/19/2024    11:40 AM 12/17/2024     1:00 PM 12/10/2024     2:35 PM   Pulmonary Function Tests   SpO2 97 % 96 % 95 %  94 % 94 % 94 %   Height  5' 9" (1.753 m)  5' 9" (1.753 m) 5' 9" (1.753 m) 5' 9" (1.753 m)    Weight 71 kg (156 lb 8.4 oz)  71.2 kg (156 lb 15.5 oz) 70.8 kg (156 lb) 70.1 kg (154 lb 6.9 oz) 70.9 kg (156 lb 4.9 oz) 72.6 kg (159 lb 15.1 oz)   BMI (Calculated)   23.2 23 22.8 23.1          Assessment:       1. Former cigarette smoker    2. Pulmonary hypertension    3. Chronic obstructive pulmonary disease, unspecified COPD type    4. Centrilobular emphysema        Encounter Medications[3]  1. Pulmonary hypertension  - Ambulatory referral/consult to Pulmonology  - Stress test, pulmonary; Future  - Echo; Future    2. Chronic obstructive pulmonary disease, unspecified COPD type  - Ambulatory referral/consult to Pulmonology    3. Former cigarette smoker  - CT Chest Lung Screening Low Dose; Future    4. Centrilobular emphysema    Plan:     Problem List Items Addressed This Visit          Pulmonary    Centrilobular " emphysema    Overview   - Managed with Trelegy daily and PRN JULIO CESAR  - Stopped smoking 1 month ago         Current Assessment & Plan   Significant chronic condition to be followed longitudinally with following long term treatment plan.    Moderate COPD on PFTs. Continue Trelegy 100; PRN Albuterol MDI.  -Nebs for emergency only. Avoid Ipratropium as competitively inhibits LAMA   -check 6MWD  -Eos 800            Cardiac/Vascular    Pulmonary hypertension    Current Assessment & Plan   Echo with PASP mildly elevated. Suspect all Group 3. Check for hypoxia with 6MWD. Check for TANYA with home sleep study.  -repeat Echo in Dec to reassess PASP.         Relevant Orders    Stress test, pulmonary    Echo       Other    Former cigarette smoker - Primary    Overview   Note: Unchanged         Current Assessment & Plan   Quit in late 2024. Yearly LDCT next in Dec 2025         Relevant Orders    CT Chest Lung Screening Low Dose     Other Visit Diagnoses         Chronic obstructive pulmonary disease, unspecified COPD type                Please note Overview Notes are historic documentation. Please review A/P for current updates.  No follow-ups on file.    Future Appointments   Date Time Provider Department Center   3/6/2025  1:00 PM Pinky Reyes MD [facility] [address]   3/10/2025 12:45 PM PULMONARY LAB [facility] [place]   3/25/2025  1:20 PM Ryne Charles MD [facility] [place]   4/22/2025  1:40 PM Storm No MD [facility] Hortencia Chamberlain MD             [1]  Past Medical History:  Diagnosis Date    Anxiety     GERD (gastroesophageal reflux disease)     Hyperlipidemia    [2]  Social History  Tobacco Use    Smoking status: Every Day     Current packs/day: 1.00     Average packs/day: 1 pack/day for 26.0 years (26.0 ttl pk-yrs)     Types: Cigarettes    Smokeless tobacco: Never   Substance Use Topics    Alcohol use: Yes     Alcohol/week: 7.0 standard drinks of alcohol     Types: 7 Glasses of  wine per week    Drug use: No   [3]  Outpatient Encounter Medications as of 2/25/2025   Medication Sig Dispense Refill    albuterol (PROVENTIL/VENTOLIN HFA) 90 mcg/actuation inhaler Inhale 2 puffs into the lungs every 4 (four) hours as needed for Wheezing. Rescue 54 g 3    amLODIPine (NORVASC) 10 MG tablet Take 1 tablet (10 mg total) by mouth once daily. 90 tablet 3    apixaban (ELIQUIS) 5 mg Tab Take 1 tablet (5 mg total) by mouth 2 (two) times daily. 180 tablet 3    ascorbic acid (VITAMIN C ORAL) Take 1 tablet by mouth once daily.      calcium carbonate (TUMS) 200 mg calcium (500 mg) chewable tablet Take 2 tablets by mouth every 8 (eight) hours as needed for Heartburn.      empagliflozin (JARDIANCE) 10 mg tablet Take 1 tablet (10 mg total) by mouth once daily. 90 tablet 3    ergocalciferol, vitamin D2, (VITAMIN D ORAL) Take 1 tablet by mouth once daily.      esomeprazole (NEXIUM) 40 MG capsule Take 1 capsule (40 mg total) by mouth once daily. 90 capsule 3    fluticasone-umeclidin-vilanter (TRELEGY ELLIPTA) 100-62.5-25 mcg DsDv Inhale 1 puff into the lungs once daily. 180 each 3    furosemide (LASIX) 20 MG tablet Take 1 tablet (20 mg total) by mouth once daily. 5 tablet 0    ibuprofen (ADVIL,MOTRIN) 200 MG tablet Take 400-600 mg by mouth every 8 (eight) hours as needed for Pain. (Second choice)      L. rhamnosus GG/inulin (CULTURELLE PROBIOTICS ORAL) Take 1 capsule by mouth once daily.      LORazepam (ATIVAN) 0.5 MG tablet Take 1 tablet (0.5 mg total) by mouth daily as needed for Anxiety. 30 tablet 3    magnesium hydroxide 400 mg/5 ml (MILK OF MAGNESIA) 400 mg/5 mL Susp Take 30 mLs by mouth daily as needed (constipation).      metoprolol tartrate (LOPRESSOR) 50 MG tablet Take 1 tablet (50 mg total) by mouth 2 (two) times daily. 180 tablet 3    predniSONE (DELTASONE) 20 MG tablet Take 2 tabs daily x 5 days started 1/28 10 tablet 0    rosuvastatin (CRESTOR) 20 MG tablet Take 1 tablet (20 mg total)  by mouth once daily. 90 tablet 3    ubidecarenone/vitamin E mixed (COQ10  ORAL) Take 1 tablet by mouth once daily.      [DISCONTINUED] albuterol-ipratropium (DUO-NEB) 2.5 mg-0.5 mg/3 mL nebulizer solution Take 3 mLs by nebulization every 6 (six) hours as needed for Wheezing. Rescue 75 mL 3    albuterol (ACCUNEB) 0.63 mg/3 mL Nebu Take 3 mLs (0.63 mg total) by nebulization every 6 (six) hours as needed (if symptoms failed to improve with inhaler). Rescue 360 mL 11    [] azithromycin (Z-TAMMY) 250 MG tablet Take 2 tablets by mouth on day 1; Take 1 tablet by mouth on days 2-5 6 tablet 0    [] oseltamivir (TAMIFLU) 75 MG capsule Take 1 capsule (75 mg total) by mouth 2 (two) times daily. for 5 days 10 capsule 0    [DISCONTINUED] albuterol-ipratropium (DUO-NEB) 2.5 mg-0.5 mg/3 mL nebulizer solution Take 3 mLs by nebulization every 6 (six) hours as needed for Wheezing. Rescue 75 mL 0     No facility-administered encounter medications on file as of 2025.

## 2025-03-06 ENCOUNTER — OFFICE VISIT (OUTPATIENT)
Dept: PRIMARY CARE CLINIC | Facility: CLINIC | Age: 70
End: 2025-03-06
Payer: MEDICARE

## 2025-03-06 VITALS
DIASTOLIC BLOOD PRESSURE: 75 MMHG | SYSTOLIC BLOOD PRESSURE: 119 MMHG | WEIGHT: 156.19 LBS | BODY MASS INDEX: 23.13 KG/M2 | HEIGHT: 69 IN | TEMPERATURE: 98 F | HEART RATE: 79 BPM | OXYGEN SATURATION: 95 %

## 2025-03-06 DIAGNOSIS — Z87.891 FORMER SMOKER: ICD-10-CM

## 2025-03-06 DIAGNOSIS — J44.9 CHRONIC OBSTRUCTIVE PULMONARY DISEASE, UNSPECIFIED COPD TYPE: ICD-10-CM

## 2025-03-06 DIAGNOSIS — I50.32 CHRONIC HEART FAILURE WITH PRESERVED EJECTION FRACTION: Primary | ICD-10-CM

## 2025-03-06 DIAGNOSIS — K59.00 CONSTIPATION, UNSPECIFIED CONSTIPATION TYPE: ICD-10-CM

## 2025-03-06 DIAGNOSIS — I27.20 PULMONARY HYPERTENSION: ICD-10-CM

## 2025-03-06 PROCEDURE — 99999 PR PBB SHADOW E&M-EST. PATIENT-LVL IV: CPT | Mod: PBBFAC,,, | Performed by: INTERNAL MEDICINE

## 2025-03-06 PROCEDURE — G2211 COMPLEX E/M VISIT ADD ON: HCPCS | Mod: S$PBB,,, | Performed by: INTERNAL MEDICINE

## 2025-03-06 PROCEDURE — 99214 OFFICE O/P EST MOD 30 MIN: CPT | Mod: S$PBB,,, | Performed by: INTERNAL MEDICINE

## 2025-03-06 PROCEDURE — 99214 OFFICE O/P EST MOD 30 MIN: CPT | Mod: PBBFAC,PN | Performed by: INTERNAL MEDICINE

## 2025-03-06 NOTE — PROGRESS NOTES
"Subjective:       Patient ID: Karina Thomas is a 69 y.o. female.    Chief Complaint: Follow-up    HPI  HFpEF - jardiance 10mg daily.  Pulmonary hypertension  COPD - recent exacerbation 2/2025 after flu. Currently on trelegy daily. Saw Dr. Po Chamberlain in pulm 2/25/25.  Reports hasn't had to use the rescue much. No SOB/TRUJILLO.   Former smoker.   CT Chest 12/18/24 -   1. No acute cardiopulmonary disease.  2. Emphysema.  3. Dilation of the main pulmonary artery up to 4 cm, which can be seen in pulmonary hypertension.  The pulmonary is more dilated when compared to 06/22/2021.  4. Severe coronary artery calcifications    Constipation. Resolved w/ milk of magnesia. Reports after BM, even the back pain resolved.     Reports her tongue is very sensitive to acidic foods. Does rinse mouth after trelegy. When drinking a soda, it burns the tongue. No dysphagia.     Review of Systems   Constitutional:  Negative for activity change and unexpected weight change.   HENT:  Negative for hearing loss, rhinorrhea and trouble swallowing.    Eyes:  Negative for discharge and visual disturbance.   Respiratory:  Negative for chest tightness and wheezing.    Cardiovascular:  Negative for chest pain and palpitations.   Gastrointestinal:  Positive for constipation. Negative for blood in stool, diarrhea and vomiting.   Endocrine: Negative for polydipsia and polyuria.   Genitourinary:  Negative for difficulty urinating, dysuria, hematuria and menstrual problem.   Musculoskeletal:  Negative for arthralgias, joint swelling and neck pain.   Neurological:  Negative for weakness and headaches.   Psychiatric/Behavioral:  Negative for confusion and dysphoric mood.          Objective:      Physical Exam    /75   Pulse 79   Temp 98.3 °F (36.8 °C) (Oral)   Ht 5' 9" (1.753 m)   Wt 70.8 kg (156 lb 3.1 oz)   SpO2 95%   BMI 23.07 kg/m²     GEN - A+OX4, NAD   HEENT - PERRL, EOMI, OP clear. MMM.   Neck - No thyromegaly or cervical LAD. No thyroid " masses felt.  CV - RRR, no m/r   Chest - CTAB, no wheezing or rhonchi  Abd - S/NT/ND/+BS.   Ext - 2+BDP and radial pulses. No LE edema.  Msk - No spinal tenderness to palpation.  Skin - No rash.    Previous labs reviewed.    Assessment/Plan     Karina was seen today for follow-up.    Diagnoses and all orders for this visit:    Chronic heart failure with preserved ejection fraction - euvolemic today. Cont jardiance 10.     Pulmonary hypertension - likely due to COPD. F/u w/ pulm.     Chronic obstructive pulmonary disease, unspecified COPD type - cont trelegy. Doing well.     Former smoker - continued cessation.    Constipation, unspecified constipation type - consider miralax 1/2 cap daily or every other day to keep constipation at bay so she doesn't have to take milk of magnesia every week.         Follow up in about 3 months (around 6/6/2025).      Pinky Reyes MD  Department of Internal Medicine - [facility]  1:08 PM

## 2025-03-14 ENCOUNTER — TELEPHONE (OUTPATIENT)
Dept: SLEEP MEDICINE | Facility: OTHER | Age: 70
End: 2025-03-14
Payer: MEDICARE

## 2025-03-16 DIAGNOSIS — I48.0 PAROXYSMAL ATRIAL FIBRILLATION: ICD-10-CM

## 2025-03-16 DIAGNOSIS — E78.5 HYPERLIPIDEMIA, UNSPECIFIED HYPERLIPIDEMIA TYPE: ICD-10-CM

## 2025-03-16 DIAGNOSIS — I10 HYPERTENSION, UNSPECIFIED TYPE: ICD-10-CM

## 2025-03-17 ENCOUNTER — PATIENT MESSAGE (OUTPATIENT)
Dept: ADMINISTRATIVE | Facility: HOSPITAL | Age: 70
End: 2025-03-17
Payer: MEDICARE

## 2025-03-17 RX ORDER — METOPROLOL TARTRATE 50 MG/1
50 TABLET ORAL 2 TIMES DAILY
Qty: 180 TABLET | Refills: 3 | Status: SHIPPED | OUTPATIENT
Start: 2025-03-17

## 2025-03-24 ENCOUNTER — TELEPHONE (OUTPATIENT)
Dept: PRIMARY CARE CLINIC | Facility: CLINIC | Age: 70
End: 2025-03-24
Payer: MEDICARE

## 2025-03-26 ENCOUNTER — PATIENT MESSAGE (OUTPATIENT)
Dept: PRIMARY CARE CLINIC | Facility: CLINIC | Age: 70
End: 2025-03-26
Payer: MEDICARE

## 2025-04-04 ENCOUNTER — TELEPHONE (OUTPATIENT)
Dept: RESEARCH | Facility: HOSPITAL | Age: 70
End: 2025-04-04
Payer: MEDICARE

## 2025-04-04 NOTE — TELEPHONE ENCOUNTER
Called patient to follow up on portal message sent to her regarding the REACH multi-cancer early detection study. LVM with brief overview of study and callback number [phone].

## 2025-04-06 ENCOUNTER — PATIENT MESSAGE (OUTPATIENT)
Dept: CARDIOLOGY | Facility: CLINIC | Age: 70
End: 2025-04-06
Payer: MEDICARE

## 2025-04-13 ENCOUNTER — PATIENT MESSAGE (OUTPATIENT)
Dept: PRIMARY CARE CLINIC | Facility: CLINIC | Age: 70
End: 2025-04-13
Payer: MEDICARE

## 2025-04-15 DIAGNOSIS — E78.5 HYPERLIPIDEMIA, UNSPECIFIED HYPERLIPIDEMIA TYPE: ICD-10-CM

## 2025-04-15 DIAGNOSIS — I48.0 PAROXYSMAL ATRIAL FIBRILLATION: ICD-10-CM

## 2025-04-15 DIAGNOSIS — I10 HYPERTENSION, UNSPECIFIED TYPE: ICD-10-CM

## 2025-05-10 ENCOUNTER — PATIENT MESSAGE (OUTPATIENT)
Dept: PRIMARY CARE CLINIC | Facility: CLINIC | Age: 70
End: 2025-05-10
Payer: MEDICARE

## 2025-05-10 DIAGNOSIS — R30.0 DYSURIA: Primary | ICD-10-CM

## 2025-05-12 RX ORDER — CEPHALEXIN 500 MG/1
500 CAPSULE ORAL EVERY 12 HOURS
Qty: 10 CAPSULE | Refills: 0 | Status: SHIPPED | OUTPATIENT
Start: 2025-05-12 | End: 2025-05-17

## 2025-05-15 ENCOUNTER — PATIENT MESSAGE (OUTPATIENT)
Dept: PRIMARY CARE CLINIC | Facility: CLINIC | Age: 70
End: 2025-05-15
Payer: MEDICARE

## 2025-05-15 DIAGNOSIS — R73.02 IGT (IMPAIRED GLUCOSE TOLERANCE): ICD-10-CM

## 2025-05-15 DIAGNOSIS — E78.5 HYPERLIPIDEMIA, UNSPECIFIED HYPERLIPIDEMIA TYPE: Primary | ICD-10-CM

## 2025-05-15 DIAGNOSIS — I10 HYPERTENSION, UNSPECIFIED TYPE: ICD-10-CM

## 2025-05-15 NOTE — TELEPHONE ENCOUNTER
Please call and notify pt:    Blood counts, liver, kidney and thyroid functions are normal.  You do not have diabetes. Vitamin D level is normal.     Cholesterol is mildly high. Please confirm she is taking pravastatin 80mg daily and fish oil daily. If so, continue at current meds.        Blake Schilling cristopher  Urology  [address] [place] [id number]  Phone: [phone]  Fax: [phone]  Follow Up Time:

## 2025-05-20 ENCOUNTER — TELEPHONE (OUTPATIENT)
Dept: PRIMARY CARE CLINIC | Facility: CLINIC | Age: 70
End: 2025-05-20
Payer: MEDICARE

## 2025-05-22 ENCOUNTER — LAB VISIT (OUTPATIENT)
Dept: LAB | Facility: HOSPITAL | Age: 70
End: 2025-05-22
Attending: INTERNAL MEDICINE
Payer: MEDICARE

## 2025-05-22 DIAGNOSIS — I10 HYPERTENSION, UNSPECIFIED TYPE: ICD-10-CM

## 2025-05-22 DIAGNOSIS — E78.5 HYPERLIPIDEMIA, UNSPECIFIED HYPERLIPIDEMIA TYPE: ICD-10-CM

## 2025-05-22 DIAGNOSIS — R73.02 IGT (IMPAIRED GLUCOSE TOLERANCE): ICD-10-CM

## 2025-05-22 LAB
ABSOLUTE EOSINOPHIL (OHS): 0.13 K/UL
ABSOLUTE MONOCYTE (OHS): 0.58 K/UL (ref 0.3–1)
ABSOLUTE NEUTROPHIL COUNT (OHS): 3.56 K/UL (ref 1.8–7.7)
ALBUMIN SERPL BCP-MCNC: 3.8 G/DL (ref 3.5–5.2)
ALP SERPL-CCNC: 57 UNIT/L (ref 40–150)
ALT SERPL W/O P-5'-P-CCNC: 28 UNIT/L (ref 10–44)
ANION GAP (OHS): 8 MMOL/L (ref 8–16)
AST SERPL-CCNC: 25 UNIT/L (ref 11–45)
BASOPHILS # BLD AUTO: 0.04 K/UL
BASOPHILS NFR BLD AUTO: 0.6 %
BILIRUB SERPL-MCNC: 0.5 MG/DL (ref 0.1–1)
BUN SERPL-MCNC: 10 MG/DL (ref 8–23)
CALCIUM SERPL-MCNC: 9 MG/DL (ref 8.7–10.5)
CHLORIDE SERPL-SCNC: 101 MMOL/L (ref 95–110)
CHOLEST SERPL-MCNC: 185 MG/DL (ref 120–199)
CHOLEST/HDLC SERPL: 2.1 {RATIO} (ref 2–5)
CO2 SERPL-SCNC: 25 MMOL/L (ref 23–29)
CREAT SERPL-MCNC: 0.7 MG/DL (ref 0.5–1.4)
EAG (OHS): 100 MG/DL (ref 68–131)
ERYTHROCYTE [DISTWIDTH] IN BLOOD BY AUTOMATED COUNT: 12.6 % (ref 11.5–14.5)
GFR SERPLBLD CREATININE-BSD FMLA CKD-EPI: >60 ML/MIN/1.73/M2
GLUCOSE SERPL-MCNC: 96 MG/DL (ref 70–110)
HBA1C MFR BLD: 5.1 % (ref 4–5.6)
HCT VFR BLD AUTO: 42.7 % (ref 37–48.5)
HDLC SERPL-MCNC: 87 MG/DL (ref 40–75)
HDLC SERPL: 47 % (ref 20–50)
HGB BLD-MCNC: 13.7 GM/DL (ref 12–16)
IMM GRANULOCYTES # BLD AUTO: 0.01 K/UL (ref 0–0.04)
IMM GRANULOCYTES NFR BLD AUTO: 0.1 % (ref 0–0.5)
LDLC SERPL CALC-MCNC: 87 MG/DL (ref 63–159)
LYMPHOCYTES # BLD AUTO: 2.45 K/UL (ref 1–4.8)
MCH RBC QN AUTO: 31.5 PG (ref 27–31)
MCHC RBC AUTO-ENTMCNC: 32.1 G/DL (ref 32–36)
MCV RBC AUTO: 98 FL (ref 82–98)
NONHDLC SERPL-MCNC: 98 MG/DL
NUCLEATED RBC (/100WBC) (OHS): 0 /100 WBC
PLATELET # BLD AUTO: 296 K/UL (ref 150–450)
PMV BLD AUTO: 10 FL (ref 9.2–12.9)
POTASSIUM SERPL-SCNC: 4.2 MMOL/L (ref 3.5–5.1)
PROT SERPL-MCNC: 6.7 GM/DL (ref 6–8.4)
RBC # BLD AUTO: 4.35 M/UL (ref 4–5.4)
RELATIVE EOSINOPHIL (OHS): 1.9 %
RELATIVE LYMPHOCYTE (OHS): 36.2 % (ref 18–48)
RELATIVE MONOCYTE (OHS): 8.6 % (ref 4–15)
RELATIVE NEUTROPHIL (OHS): 52.6 % (ref 38–73)
SODIUM SERPL-SCNC: 134 MMOL/L (ref 136–145)
TRIGL SERPL-MCNC: 55 MG/DL (ref 30–150)
WBC # BLD AUTO: 6.77 K/UL (ref 3.9–12.7)

## 2025-05-22 PROCEDURE — 85025 COMPLETE CBC W/AUTO DIFF WBC: CPT

## 2025-05-22 PROCEDURE — 83036 HEMOGLOBIN GLYCOSYLATED A1C: CPT

## 2025-05-22 PROCEDURE — 82040 ASSAY OF SERUM ALBUMIN: CPT

## 2025-05-22 PROCEDURE — 80061 LIPID PANEL: CPT

## 2025-05-22 PROCEDURE — 36415 COLL VENOUS BLD VENIPUNCTURE: CPT

## 2025-06-05 ENCOUNTER — TELEPHONE (OUTPATIENT)
Dept: PRIMARY CARE CLINIC | Facility: CLINIC | Age: 70
End: 2025-06-05

## 2025-06-05 ENCOUNTER — OFFICE VISIT (OUTPATIENT)
Dept: PRIMARY CARE CLINIC | Facility: CLINIC | Age: 70
End: 2025-06-05
Payer: MEDICARE

## 2025-06-05 ENCOUNTER — TELEPHONE (OUTPATIENT)
Facility: CLINIC | Age: 70
End: 2025-06-05
Payer: MEDICARE

## 2025-06-05 VITALS
HEART RATE: 68 BPM | BODY MASS INDEX: 23.35 KG/M2 | DIASTOLIC BLOOD PRESSURE: 82 MMHG | SYSTOLIC BLOOD PRESSURE: 116 MMHG | HEIGHT: 69 IN | OXYGEN SATURATION: 96 % | WEIGHT: 157.63 LBS | TEMPERATURE: 98 F

## 2025-06-05 DIAGNOSIS — I27.20 PULMONARY HYPERTENSION: Primary | ICD-10-CM

## 2025-06-05 DIAGNOSIS — N39.0 RECURRENT UTI: ICD-10-CM

## 2025-06-05 DIAGNOSIS — J44.9 CHRONIC OBSTRUCTIVE PULMONARY DISEASE, UNSPECIFIED COPD TYPE: ICD-10-CM

## 2025-06-05 PROCEDURE — G2211 COMPLEX E/M VISIT ADD ON: HCPCS | Mod: ,,, | Performed by: INTERNAL MEDICINE

## 2025-06-05 PROCEDURE — 99999 PR PBB SHADOW E&M-EST. PATIENT-LVL IV: CPT | Mod: PBBFAC,,, | Performed by: INTERNAL MEDICINE

## 2025-06-05 PROCEDURE — 99214 OFFICE O/P EST MOD 30 MIN: CPT | Mod: S$PBB,,, | Performed by: INTERNAL MEDICINE

## 2025-06-05 PROCEDURE — 99214 OFFICE O/P EST MOD 30 MIN: CPT | Mod: PBBFAC,PN | Performed by: INTERNAL MEDICINE

## 2025-06-05 RX ORDER — CEPHALEXIN 500 MG/1
500 CAPSULE ORAL EVERY 12 HOURS
Qty: 14 CAPSULE | Refills: 0 | Status: SHIPPED | OUTPATIENT
Start: 2025-06-05 | End: 2025-06-12

## 2025-06-06 ENCOUNTER — DOCUMENTATION ONLY (OUTPATIENT)
Dept: PRIMARY CARE CLINIC | Facility: CLINIC | Age: 70
End: 2025-06-06
Payer: MEDICARE

## 2025-06-06 ENCOUNTER — PATIENT MESSAGE (OUTPATIENT)
Dept: PRIMARY CARE CLINIC | Facility: CLINIC | Age: 70
End: 2025-06-06
Payer: MEDICARE

## 2025-06-11 ENCOUNTER — TELEPHONE (OUTPATIENT)
Dept: NEUROLOGY | Facility: CLINIC | Age: 70
End: 2025-06-11
Payer: MEDICARE

## 2025-06-21 DIAGNOSIS — F41.9 ANXIETY: ICD-10-CM

## 2025-06-23 RX ORDER — LORAZEPAM 0.5 MG/1
0.5 TABLET ORAL DAILY PRN
Qty: 30 TABLET | Refills: 3 | Status: SHIPPED | OUTPATIENT
Start: 2025-06-23

## 2025-07-01 ENCOUNTER — PATIENT MESSAGE (OUTPATIENT)
Dept: PRIMARY CARE CLINIC | Facility: CLINIC | Age: 70
End: 2025-07-01
Payer: MEDICARE

## 2025-07-01 DIAGNOSIS — R30.0 DYSURIA: Primary | ICD-10-CM

## 2025-07-02 ENCOUNTER — LAB VISIT (OUTPATIENT)
Dept: LAB | Facility: HOSPITAL | Age: 70
End: 2025-07-02
Attending: INTERNAL MEDICINE
Payer: MEDICARE

## 2025-07-02 ENCOUNTER — RESULTS FOLLOW-UP (OUTPATIENT)
Dept: PRIMARY CARE CLINIC | Facility: CLINIC | Age: 70
End: 2025-07-02
Payer: MEDICARE

## 2025-07-02 ENCOUNTER — PATIENT MESSAGE (OUTPATIENT)
Dept: PRIMARY CARE CLINIC | Facility: CLINIC | Age: 70
End: 2025-07-02
Payer: MEDICARE

## 2025-07-02 DIAGNOSIS — N39.0 URINARY TRACT INFECTION WITHOUT HEMATURIA, SITE UNSPECIFIED: ICD-10-CM

## 2025-07-02 DIAGNOSIS — R30.0 DYSURIA: ICD-10-CM

## 2025-07-02 LAB
BACTERIA #/AREA URNS AUTO: ABNORMAL /HPF
BILIRUB UR QL STRIP.AUTO: NEGATIVE
CLARITY UR: CLEAR
COLOR UR AUTO: YELLOW
GLUCOSE UR QL STRIP: NEGATIVE
HGB UR QL STRIP: NEGATIVE
KETONES UR QL STRIP: NEGATIVE
LEUKOCYTE ESTERASE UR QL STRIP: ABNORMAL
MICROSCOPIC COMMENT: ABNORMAL
NITRITE UR QL STRIP: NEGATIVE
PH UR STRIP: 6 [PH]
PROT UR QL STRIP: NEGATIVE
RBC #/AREA URNS AUTO: 1 /HPF (ref 0–4)
SP GR UR STRIP: 1.01
SQUAMOUS #/AREA URNS AUTO: <1 /HPF
UROBILINOGEN UR STRIP-ACNC: NEGATIVE EU/DL
WBC #/AREA URNS AUTO: 9 /HPF (ref 0–5)

## 2025-07-02 PROCEDURE — 87086 URINE CULTURE/COLONY COUNT: CPT

## 2025-07-02 PROCEDURE — 81003 URINALYSIS AUTO W/O SCOPE: CPT

## 2025-07-03 LAB — HOLD SPECIMEN: NORMAL

## 2025-07-03 RX ORDER — PHENAZOPYRIDINE HYDROCHLORIDE 200 MG/1
200 TABLET, FILM COATED ORAL 3 TIMES DAILY PRN
Qty: 30 TABLET | Refills: 0 | Status: SHIPPED | OUTPATIENT
Start: 2025-07-03 | End: 2025-07-13

## 2025-07-04 LAB — BACTERIA UR CULT: NO GROWTH

## 2025-07-08 NOTE — PROGRESS NOTES
"OBSTETRICS AND GYNECOLOGY      Chief Complaint:  Well Woman Exam     HPI:      Karina Thomas is a 70 y.o.  who presents today for well woman exam.    LMP: No LMP recorded. Patient has had a hysterectomy. Patient denies abnormal vaginal bleeding, abnormal discharge/odor, or pelvic pain.  Ms. Thomas is currently sexually active with a single male partner.  She declines STD screening today.  Recurrent UTIs, upcoming Urology apt.  She denies additional acute issues, problems, or complaints.    Past Medical History:   Diagnosis Date    Anxiety     Dyspareunia     GERD (gastroesophageal reflux disease)     Hyperlipidemia     Urinary incontinence       OB History          4    Para   4    Term   2            AB        Living   2         SAB        IAB        Ectopic        Multiple        Live Births                     ROS:     GENERAL: Feeling well overall.   BREASTS: Denies breast skin changes, lumps or nipple discharge.   URINARY: Denies dysuria, hematuria.    Physical Exam:      PHYSICAL EXAM:  /78 (BP Location: Left arm, Patient Position: Sitting)   Ht 5' 9" (1.753 m)   Wt 71.6 kg (157 lb 13.6 oz)   BMI 23.31 kg/m²   Body mass index is 23.31 kg/m².     APPEARANCE:  Well nourished, well developed, in no acute distress.  Able to smile appropriately during our encounter. Makes eye contact. Pleasant.  PSYCH: Appropriate mood and affect.  SKIN:   No acne or hirsutism.  CARDIOVASCULAR:  No edema of peripheral extremities. Well perfused throughout.  RESP:  No accessory muscle use to breathe. Speaking comfortably in complete sentences.   BREASTS:  Symmetrical, with no visible skin lesions or scars, no palpable masses. No nipple discharge or peau d'orange bilaterally. No palpable axillary LAD.  ABDOMEN:  Soft. Nonacute.  PELVIC:  Normal external genitalia without lesions. Normal hair distribution. Adequate perineal body, normal urethral meatus. Vagina moist and well rugated. Without lesions, " without discharge. Adnexa without masses or tenderness, perhaps slightly more fullness R vs L.      Female chaperone present.    Assessment/Plan:     -- Counseled patient regarding healthy diet and regular exercise.    -- BP normotensive   -- She denies abuse and feels safe at home.   -- STD screening:  declines   -- TVH  for h/o abnormal paps, would now be >30yrs  -- MMG:  BiRADS Category 1, TC 2.7%; 10/2024, schedule    -- DEXA screenin normal BMD    -- Primary OB/GYN rec repeat pelvic US to follow simple appearing ROV cyst   Schedule     Follow up in about 1 year (around 7/10/2026) for WWE.    Counseling:     Patient was counseled today on current ASCCP pap guidelines, the recommendation for yearly physical exams, breast self awareness and annual mammograms. She is to see her PCP for other health maintenance.     Use of the [facility] Patient Portal discussed and encouraged during today's visit.                 As of 2021, the Cures Act has been passed nationally. This new law requires that all doctors progress notes, lab results, pathology reports and radiology reports be released IMMEDIATELY to the patient in the patient portal. That means that the results are released to you at the EXACT same time they are released to me. Therefore, with all of the patients that I have I am not able to reply to each patient exactly when the results come in. So there will be a delay from when you see the results to when I see them and have time to come up with a response to send you. Also I only see these results when I am on the computer at work. So if the results come in over the weekend or after 5 pm of a work day, I will not see them until the next business day. As you can tell, this is a challenge as a physician to give every patient the quick response they hope for and deserve. So please be patient!   Thanks for your understanding and patience.

## 2025-07-10 ENCOUNTER — OFFICE VISIT (OUTPATIENT)
Dept: OBSTETRICS AND GYNECOLOGY | Facility: CLINIC | Age: 70
End: 2025-07-10
Payer: MEDICARE

## 2025-07-10 VITALS
BODY MASS INDEX: 23.38 KG/M2 | SYSTOLIC BLOOD PRESSURE: 110 MMHG | HEIGHT: 69 IN | DIASTOLIC BLOOD PRESSURE: 78 MMHG | WEIGHT: 157.88 LBS

## 2025-07-10 DIAGNOSIS — N83.201 RIGHT OVARIAN CYST: ICD-10-CM

## 2025-07-10 DIAGNOSIS — Z12.31 ENCOUNTER FOR SCREENING MAMMOGRAM FOR MALIGNANT NEOPLASM OF BREAST: ICD-10-CM

## 2025-07-10 DIAGNOSIS — Z01.419 ENCOUNTER FOR WELL WOMAN EXAM: Primary | ICD-10-CM

## 2025-07-10 PROCEDURE — 99214 OFFICE O/P EST MOD 30 MIN: CPT | Mod: PBBFAC | Performed by: STUDENT IN AN ORGANIZED HEALTH CARE EDUCATION/TRAINING PROGRAM

## 2025-07-10 PROCEDURE — 99999 PR PBB SHADOW E&M-EST. PATIENT-LVL IV: CPT | Mod: PBBFAC,,, | Performed by: STUDENT IN AN ORGANIZED HEALTH CARE EDUCATION/TRAINING PROGRAM

## 2025-07-14 ENCOUNTER — TELEPHONE (OUTPATIENT)
Dept: UROLOGY | Facility: CLINIC | Age: 70
End: 2025-07-14
Payer: MEDICARE

## 2025-07-14 NOTE — TELEPHONE ENCOUNTER
Moved appt to 1:00 pm    Copied from CRM #[id number]. Topic: Appointments - Appointment Access  >> Jul 14, 2025 11:13 AM Iraida wrote:  Patient calling to advise she has a 10:40 pulm appt on 7/17, may run late for her urology appt. If there is a later time patient would like to change time .

## 2025-07-17 ENCOUNTER — OFFICE VISIT (OUTPATIENT)
Dept: PULMONOLOGY | Facility: CLINIC | Age: 70
End: 2025-07-17
Payer: MEDICARE

## 2025-07-17 ENCOUNTER — OFFICE VISIT (OUTPATIENT)
Dept: UROLOGY | Facility: CLINIC | Age: 70
End: 2025-07-17
Payer: MEDICARE

## 2025-07-17 VITALS
BODY MASS INDEX: 23.32 KG/M2 | HEIGHT: 69 IN | HEART RATE: 72 BPM | BODY MASS INDEX: 23.25 KG/M2 | OXYGEN SATURATION: 97 % | SYSTOLIC BLOOD PRESSURE: 126 MMHG | DIASTOLIC BLOOD PRESSURE: 78 MMHG | DIASTOLIC BLOOD PRESSURE: 87 MMHG | SYSTOLIC BLOOD PRESSURE: 120 MMHG | WEIGHT: 157.44 LBS | WEIGHT: 157.44 LBS | HEART RATE: 72 BPM

## 2025-07-17 DIAGNOSIS — I27.20 PULMONARY HYPERTENSION: Primary | ICD-10-CM

## 2025-07-17 DIAGNOSIS — G47.33 OSA (OBSTRUCTIVE SLEEP APNEA): ICD-10-CM

## 2025-07-17 DIAGNOSIS — I48.0 PAROXYSMAL ATRIAL FIBRILLATION: ICD-10-CM

## 2025-07-17 DIAGNOSIS — N95.8 GENITOURINARY SYNDROME OF MENOPAUSE: Primary | ICD-10-CM

## 2025-07-17 DIAGNOSIS — Z87.891 FORMER CIGARETTE SMOKER: ICD-10-CM

## 2025-07-17 DIAGNOSIS — J43.2 CENTRILOBULAR EMPHYSEMA: ICD-10-CM

## 2025-07-17 DIAGNOSIS — R30.0 DYSURIA: ICD-10-CM

## 2025-07-17 PROCEDURE — 99214 OFFICE O/P EST MOD 30 MIN: CPT | Mod: PBBFAC | Performed by: INTERNAL MEDICINE

## 2025-07-17 PROCEDURE — 99214 OFFICE O/P EST MOD 30 MIN: CPT | Mod: PBBFAC,27

## 2025-07-17 PROCEDURE — 99999 PR PBB SHADOW E&M-EST. PATIENT-LVL IV: CPT | Mod: PBBFAC,,, | Performed by: INTERNAL MEDICINE

## 2025-07-17 PROCEDURE — 99999 PR PBB SHADOW E&M-EST. PATIENT-LVL IV: CPT | Mod: PBBFAC,,,

## 2025-07-17 RX ORDER — ESTRADIOL 0.1 MG/G
1 CREAM VAGINAL
Qty: 42.5 G | Refills: 3 | Status: SHIPPED | OUTPATIENT
Start: 2025-07-18 | End: 2026-07-18

## 2025-07-17 NOTE — ASSESSMENT & PLAN NOTE
Significant chronic condition to be followed longitudinally with following long term treatment plan.    Moderate COPD on PFTs. Continue Trelegy 100; PRN Albuterol MDI.  -Nebs for emergency only. Avoid Ipratropium as competitively inhibits LAMA   -check 6MWD  -Eos 800-> 130

## 2025-07-17 NOTE — PROGRESS NOTES
[facility] Department of Urology      [facility] Note    7/17/2025    Referred by:  Pinky Reyes MD    History of Present Illness    CHIEF COMPLAINT:  Patient presents today for recurrent urinary symptoms concerning for UTIs.    HISTORY OF PRESENT ILLNESS:  She experiences characterized by pressure sensation and severe burning with urination. During these episodes, she has minimal urine output despite having the urge to urinate. She denies any fever, chills, or systemic symptoms. She is currently asymptomatic. A recent urine culture from the beginning of the month showed no bacterial growth. She was advised by Dr. Reyes to take Azo for symptomatic relief, which she is currently taking.     URINARY SYMPTOMS:  She denies issues with bladder emptying and does not need to push while urinating. She reports no daytime urinary frequency or urgency. She experiences occasional stress incontinence requiring a small pad for urine leakage with coughing or sudden movements.    MEDICAL HISTORY:  She has a history of recurrent urinary tract infections, with a particularly severe episode in the last couple of years. She denies history of breast cancer. The most recent positive culture is from January 2024 with Klebsiella.     PERSONAL CARE:  She uses sensitive skin pads and soaps free of perfumes and harsh chemicals to minimize skin irritation.        A review of 10+ systems was conducted with pertinent positive and negative findings documented in HPI with all other systems reviewed and negative.    Past medical, family, surgical and social history reviewed as documented in chart with pertinent positive medical, family, surgical and social history detailed in HPI.    Exam Findings:    Const: no acute distress, conversant and alert  Eyes: anicteric, extraocular muscles intact  ENMT: normocephalic, Nl oral membranes  Cardio: no cyanosis, nl cap refill  Pulm: no tachypnea; no resp distress  Abd: soft, no tenderness  Musc: no laceration, no  tenderness  Neuro: alert; oriented x 3  Skin: warm, dry; no petichiae  Psych: no anxiety; normal speech       Assessment/Plan:    Assessment & Plan    IMPRESSION:  - Assessed UTI-like symptoms and recent negative urine culture.  - Determined symptoms likely due to genitourinary syndrome of menopause (vaginal atrophy) rather than UTI.  - Considered pelvic floor dysfunction as potential cause of pressure symptoms.  - Also considered potential for IC diagnosis.   - Decided against immediate antibiotic treatment to avoid increasing susceptibility to future UTIs.  - Plan to initiate vaginal estrogen cream to improve vaginal tissue health and potentially reduce UTI-like symptoms.  - Considered pelvic floor physical therapy as future option if symptoms persist after estrogen treatment.    POSTMENOPAUSAL ATROPHIC VAGINITIS (GENITOURINARY SYNDROME OF MENOPAUSE):  1. Explained genitourinary syndrome of menopause (formerly called vaginal atrophy), its effects on vaginal tissue, and how vaginal tissue changes after menopause.  2. Explained how atrophied vaginal tissue can lead to increased susceptibility to UTIs.  3. Started vaginal estrogen cream: Apply pea-sized amount with clean finger vaginally at night, 3 times per week (Monday, Wednesday, Friday).    URINARY TRACT INFECTION:  1. Contact the office if experiencing UTI-like symptoms for catheterized urine specimen collection and culture.    INTERSTITIAL CYSTITIS:  1. Mentioned interstitial cystitis as another condition that can mimic UTI symptoms.    FOLLOW-UP:  1. Follow up in 3 months to assess improvement.  2. Use patient portal to message about recurring symptoms.     I spent a total of 45 minutes on the day of the visit.This includes face to face time and non-face to face time preparing to see the patient (eg, review of tests), obtaining and/or reviewing separately obtained history, documenting clinical information in the electronic or other health record,  independently interpreting results and communicating results to the patient/family/caregiver, or care coordinator.              Visit complexity today is associated with medical care services that are part of the ongoing care related to the single serious and/or complex condition of Genitourinary syndrome of menopause (GSM). A longitudinal relationship exists or is being developed between the patient and this practitioner for the care of this condition.

## 2025-07-17 NOTE — PROGRESS NOTES
Subjective:       Patient ID: Karina Thomas is a 70 y.o. female.  The patient's last visit with me was on 2/25/2025.     HPI  History of Present Illness    CHIEF COMPLAINT:  Patient presents today for follow up of COPD.    COPD:  She reports Trelegy is working effectively for COPD management with good medication compliance. She consistently performs proper 10-second breath hold technique and thorough mouth rinsing, including teeth brushing and Listerine use after each dose. She experiences morning symptoms characterized by generalized aching upon waking, with a mild cough that resolves within an hour. She rarely uses nebulizer treatments, reserving them for emergencies. She currently denies shortness of breath and reports being able to climb stairs without difficulty.    SLEEP:  She acknowledges the recommendation for a sleep study which has not yet been completed and expresses interest in proceeding with the evaluation.        Review of Systems    Objective:      Vitals:    07/17/25 1022   BP: 120/87   Pulse: 72     Wt Readings from Last 3 Encounters:   07/17/25 71.4 kg (157 lb 6.5 oz)   07/10/25 71.6 kg (157 lb 13.6 oz)   06/05/25 71.5 kg (157 lb 10.1 oz)     Temp Readings from Last 3 Encounters:   06/05/25 98.2 °F (36.8 °C) (Oral)   03/06/25 98.3 °F (36.8 °C) (Oral)   01/27/25 97.3 °F (36.3 °C) (Oral)     BP Readings from Last 3 Encounters:   07/17/25 120/87   07/10/25 110/78   06/05/25 116/82     Pulse Readings from Last 3 Encounters:   07/17/25 72   06/05/25 68   03/06/25 79       Physical Exam    CBC  Lab Results   Component Value Date    WBC 6.77 05/22/2025    HGB 13.7 05/22/2025    HCT 42.7 05/22/2025    MCV 98 05/22/2025     05/22/2025         CMP  Sodium   Date Value Ref Range Status   05/22/2025 134 (L) 136 - 145 mmol/L Final   12/18/2024 136 136 - 145 mmol/L Final     Potassium   Date Value Ref Range Status   05/22/2025 4.2 3.5 - 5.1 mmol/L Final   12/18/2024 3.6 3.5 - 5.1 mmol/L Final     Chloride    Date Value Ref Range Status   05/22/2025 101 95 - 110 mmol/L Final   12/18/2024 102 95 - 110 mmol/L Final     CO2   Date Value Ref Range Status   05/22/2025 25 23 - 29 mmol/L Final   12/18/2024 25 23 - 29 mmol/L Final     Glucose   Date Value Ref Range Status   05/22/2025 96 70 - 110 mg/dL Final   12/18/2024 101 70 - 110 mg/dL Final     BUN   Date Value Ref Range Status   05/22/2025 10 8 - 23 mg/dL Final     Creatinine   Date Value Ref Range Status   05/22/2025 0.7 0.5 - 1.4 mg/dL Final     Calcium   Date Value Ref Range Status   05/22/2025 9.0 8.7 - 10.5 mg/dL Final   12/18/2024 9.3 8.7 - 10.5 mg/dL Final     Protein Total   Date Value Ref Range Status   05/22/2025 6.7 6.0 - 8.4 gm/dL Final     Total Protein   Date Value Ref Range Status   12/18/2024 6.8 6.0 - 8.4 g/dL Final     Albumin   Date Value Ref Range Status   05/22/2025 3.8 3.5 - 5.2 g/dL Final   12/18/2024 4.1 3.5 - 5.2 g/dL Final     Total Bilirubin   Date Value Ref Range Status   12/18/2024 0.5 0.1 - 1.0 mg/dL Final     Comment:     For infants and newborns, interpretation of results should be based  on gestational age, weight and in agreement with clinical  observations.    Premature Infant recommended reference ranges:  Up to 24 hours.............<8.0 mg/dL  Up to 48 hours............<12.0 mg/dL  3-5 days..................<15.0 mg/dL  6-29 days.................<15.0 mg/dL       Bilirubin Total   Date Value Ref Range Status   05/22/2025 0.5 0.1 - 1.0 mg/dL Final     Comment:     For infants and newborns, interpretation of results should be based   on gestational age, weight and in agreement with clinical   observations.    Premature Infant recommended reference ranges:   0-24 hours:  <8.0 mg/dL   24-48 hours: <12.0 mg/dL   3-5 days:    <15.0 mg/dL   6-29 days:   <15.0 mg/dL     Alkaline Phosphatase   Date Value Ref Range Status   12/18/2024 51 40 - 150 U/L Final     ALP   Date Value Ref Range Status   05/22/2025 57 40 - 150 unit/L Final     AST  "  Date Value Ref Range Status   05/22/2025 25 11 - 45 unit/L Final   12/18/2024 25 10 - 40 U/L Final     ALT   Date Value Ref Range Status   05/22/2025 28 10 - 44 unit/L Final   12/18/2024 24 10 - 44 U/L Final     Anion Gap   Date Value Ref Range Status   05/22/2025 8 8 - 16 mmol/L Final     eGFR   Date Value Ref Range Status   05/22/2025 >60 >60 mL/min/1.73/m2 Final     Comment:     Estimated GFR calculated using the CKD-EPI creatinine (2021) equation.   12/18/2024 >60.0 >60 mL/min/1.73 m^2 Final       ABG  No results found for: "PH", "PO2", "PCO2"        Personal Diagnostic Review  I have personally reviewed the following data and added my own interpretation as below:        7/17/2025    10:39 AM 7/17/2025    10:22 AM 7/10/2025     1:38 PM 6/5/2025    12:58 PM 3/6/2025     1:14 PM 2/25/2025     8:48 AM 1/27/2025     9:33 AM   Pulmonary Function Tests   SpO2  97 %  96 % 95 % 97 % 96 %   Height  5' 9" (1.753 m) 5' 9" (1.753 m) 5' 9" (1.753 m) 5' 9" (1.753 m)  5' 9" (1.753 m)   Weight 71.4 kg (157 lb 6.5 oz) 71.4 kg (157 lb 6.5 oz) 71.6 kg (157 lb 13.6 oz) 71.5 kg (157 lb 10.1 oz) 70.8 kg (156 lb 3.1 oz) 71 kg (156 lb 8.4 oz)    BMI (Calculated) 23.2 23.2 23.3 23.3 23.1           Assessment:       1. Pulmonary hypertension    2. Former cigarette smoker    3. Paroxysmal atrial fibrillation    4. Centrilobular emphysema    5. TANYA (obstructive sleep apnea)        Encounter Medications[1]  1. Pulmonary hypertension  - Home Sleep Study; Future    2. Former cigarette smoker    3. Paroxysmal atrial fibrillation    4. Centrilobular emphysema    5. TANYA (obstructive sleep apnea)  - Home Sleep Study; Future    Plan:     Problem List Items Addressed This Visit          Pulmonary    Centrilobular emphysema    Overview   - Managed with Trelegy daily and PRN JULIO CESAR  - Stopped smoking 1 month ago         Current Assessment & Plan   Significant chronic condition to be followed longitudinally with following long term treatment " plan.    Moderate COPD on PFTs. Continue Trelegy 100; PRN Albuterol MDI.  -Nebs for emergency only. Avoid Ipratropium as competitively inhibits LAMA   -check 6MWD  -Eos 800-> 130            Cardiac/Vascular    Paroxysmal atrial fibrillation    Current Assessment & Plan   Safe to utilize beta 1 selective beta blockers.         Pulmonary hypertension - Primary    Current Assessment & Plan   Echo with PASP mildly elevated. Suspect all Group 3. Check for hypoxia with 6MWD. Check for TANYA with home sleep study. Encouraged patient to schedule  -repeat Echo in Dec to reassess PASP.         Relevant Orders    Home Sleep Study       Other    Former cigarette smoker    Overview   Note: Unchanged          Other Visit Diagnoses         TANYA (obstructive sleep apnea)        Relevant Orders    Home Sleep Study            Assessment & Plan    I27.20 Pulmonary hypertension  I48.0 Paroxysmal atrial fibrillation  J43.2 Centrilobular emphysema  Z87.891 Former cigarette smoker  G47.33 TANYA (obstructive sleep apnea)    IMPRESSION:  Reviewed primary care notes and recent imaging results.  Noted improvement in COPD symptoms with Trelegy, reducing reliance on nebulizer treatments.  Anticipated potential improvement in pulmonary pressures due to better COPD management.    I27.20 PULMONARY HYPERTENSION:  - Ordered echocardiogram for December 2025 to reassess pulmonary HTN.  - Office will schedule closer to December 2025.    J43.2 CENTRILOBULAR EMPHYSEMA:  - Patient to continue thorough rinsing after using inhaler and maintain proper inhaler technique, including 10-second breath hold.  - Continued Trelegy at current dose for COPD management, use nebulizer treatments only for emergencies.    Z87.891 FORMER CIGARETTE SMOKER:  - Explained importance of lung cancer screening for early detection and improved curability.  - Clarified that most nodules found during screening are not cancerous but require monitoring.  - Ordered low-dose CT Chest for  December 2025 to continue lung cancer screening.  - Office will schedule closer to December 2025.    G47.33 TANYA (OBSTRUCTIVE SLEEP APNEA):  - Discussed potential link between sleep apnea and PH  - Ordered sleep study (home-based) to investigate morning soreness, potentially related to sleep apnea.  - Sleep department will contact patient to arrange study.         Orders Placed This Encounter    Home Sleep Study     Standing Status:   Future     Expiration Date:   7/17/2026       Please note Overview Notes are historic documentation. Please review A/P for current updates.  Follow up in about 5 months (around 12/17/2025).    Future Appointments   Date Time Provider Department Center   7/17/2025  1:00 PM Suri Blanton NP [facility] URO [place]   7/21/2025  9:40 AM Danilo Walker MD [facility] NEURO [facility]   8/12/2025  1:00 PM [facility] US1 [facility] ULTRA [place]   10/8/2025  1:00 PM Pinky Reyes MD [facility] 65PLUS 65+ [place]   10/17/2025  1:20 PM Rossana García MD [facility] CARDIO [place]   10/21/2025  1:00 PM [facility] MAMMO2 [facility] MAMMO [place]             Po Chamberlain MD           [1]   Outpatient Encounter Medications as of 7/17/2025   Medication Sig Dispense Refill    albuterol (ACCUNEB) 0.63 mg/3 mL Nebu Take 3 mLs (0.63 mg total) by nebulization every 6 (six) hours as needed (if symptoms failed to improve with inhaler). Rescue 360 mL 11    amLODIPine (NORVASC) 10 MG tablet Take 1 tablet (10 mg total) by mouth once daily. 90 tablet 3    apixaban (ELIQUIS) 5 mg Tab Take 1 tablet (5 mg total) by mouth 2 (two) times daily. 180 tablet 3    ascorbic acid (VITAMIN C ORAL) Take 1 tablet by mouth once daily.      calcium carbonate (TUMS) 200 mg calcium (500 mg) chewable tablet Take 2 tablets by mouth every 8 (eight) hours as needed for Heartburn.      ergocalciferol, vitamin D2, (VITAMIN D ORAL) Take 1 tablet by mouth once daily.      esomeprazole (NEXIUM) 40 MG capsule Take 1 capsule (40 mg total) by mouth once daily. 90 capsule  3    fluticasone-umeclidin-vilanter (TRELEGY ELLIPTA) 100-62.5-25 mcg DsDv Inhale 1 puff into the lungs once daily. 180 each 3    furosemide (LASIX) 20 MG tablet Take 1 tablet (20 mg total) by mouth once daily. 5 tablet 0    ibuprofen (ADVIL,MOTRIN) 200 MG tablet Take 400-600 mg by mouth every 8 (eight) hours as needed for Pain. (Second choice)      L. rhamnosus GG/inulin (CULTURELLE PROBIOTICS ORAL) Take 1 capsule by mouth once daily.      lactulose (CHRONULAC) 20 gram/30 mL Soln Take 30 mLs (20 g total) by mouth daily as needed (sever constipation). 300 mL 0    LORazepam (ATIVAN) 0.5 MG tablet Take 1 tablet (0.5 mg total) by mouth daily as needed for Anxiety. 30 tablet 3    magnesium hydroxide 400 mg/5 ml (MILK OF MAGNESIA) 400 mg/5 mL Susp Take 30 mLs by mouth daily as needed (constipation).      metoprolol tartrate (LOPRESSOR) 50 MG tablet Take 1 tablet (50 mg total) by mouth 2 (two) times daily. 180 tablet 3    predniSONE (DELTASONE) 20 MG tablet Take 2 tabs daily x 5 days started  10 tablet 0    rosuvastatin (CRESTOR) 20 MG tablet Take 1 tablet (20 mg total) by mouth once daily. 90 tablet 3    ubidecarenone/vitamin E mixed (COQ10  ORAL) Take 1 tablet by mouth once daily.      [] phenazopyridine (PYRIDIUM) 200 MG tablet Take 1 tablet (200 mg total) by mouth 3 (three) times daily as needed for Pain. 30 tablet 0    [DISCONTINUED] LORazepam (ATIVAN) 0.5 MG tablet Take 1 tablet (0.5 mg total) by mouth daily as needed for Anxiety. 30 tablet 3     No facility-administered encounter medications on file as of 2025.

## 2025-07-17 NOTE — ASSESSMENT & PLAN NOTE
Echo with PASP mildly elevated. Suspect all Group 3. Check for hypoxia with 6MWD. Check for TANYA with home sleep study. Encouraged patient to schedule  -repeat Echo in Dec to reassess PASP.

## 2025-07-18 ENCOUNTER — PATIENT MESSAGE (OUTPATIENT)
Dept: UROLOGY | Facility: CLINIC | Age: 70
End: 2025-07-18
Payer: MEDICARE

## 2025-07-18 DIAGNOSIS — R30.0 DYSURIA: Primary | ICD-10-CM

## 2025-07-19 ENCOUNTER — PATIENT MESSAGE (OUTPATIENT)
Dept: PRIMARY CARE CLINIC | Facility: CLINIC | Age: 70
End: 2025-07-19
Payer: MEDICARE

## 2025-07-19 DIAGNOSIS — N95.8 GENITOURINARY SYNDROME OF MENOPAUSE: ICD-10-CM

## 2025-07-21 RX ORDER — ESTRADIOL 0.1 MG/G
1 CREAM VAGINAL
Qty: 42.5 G | Refills: 3 | Status: SHIPPED | OUTPATIENT
Start: 2025-07-21 | End: 2026-07-21

## 2025-07-22 ENCOUNTER — DOCUMENTATION ONLY (OUTPATIENT)
Dept: PRIMARY CARE CLINIC | Facility: CLINIC | Age: 70
End: 2025-07-22
Payer: MEDICARE

## 2025-07-22 ENCOUNTER — OFFICE VISIT (OUTPATIENT)
Dept: UROLOGY | Facility: CLINIC | Age: 70
End: 2025-07-22
Payer: MEDICARE

## 2025-07-22 VITALS
WEIGHT: 157.44 LBS | BODY MASS INDEX: 23.25 KG/M2 | HEART RATE: 79 BPM | SYSTOLIC BLOOD PRESSURE: 135 MMHG | DIASTOLIC BLOOD PRESSURE: 76 MMHG

## 2025-07-22 DIAGNOSIS — N95.8 GENITOURINARY SYNDROME OF MENOPAUSE: ICD-10-CM

## 2025-07-22 DIAGNOSIS — R30.0 DYSURIA: Primary | ICD-10-CM

## 2025-07-22 PROCEDURE — 87186 SC STD MICRODIL/AGAR DIL: CPT

## 2025-07-22 PROCEDURE — G2211 COMPLEX E/M VISIT ADD ON: HCPCS | Mod: ,,,

## 2025-07-22 PROCEDURE — 99214 OFFICE O/P EST MOD 30 MIN: CPT | Mod: PBBFAC

## 2025-07-22 PROCEDURE — 99999 PR PBB SHADOW E&M-EST. PATIENT-LVL IV: CPT | Mod: PBBFAC,,,

## 2025-07-22 PROCEDURE — 99214 OFFICE O/P EST MOD 30 MIN: CPT | Mod: S$PBB,,,

## 2025-07-22 NOTE — PROGRESS NOTES
[facility]      Return [facility] Note    7/22/2025    Referred by:  No ref. provider found    History of Present Illness    CHIEF COMPLAINT:  Patient presents today for follow up regarding vaginal symptoms and continued dysuria.     GENITOURINARY/VAGINAL SYMPTOMS:  She currently uses vaginal estrogen cream but reports difficulty with the treatment regimen. She expresses significant anxiety about potentially developing a UTI during upcoming travel. She has a history of UTIs with varying antibiotic sensitivities and is concerned about having appropriate treatment available during travel. She continues to have dysuria.        A review of 10+ systems was conducted with pertinent positive and negative findings documented in HPI with all other systems reviewed and negative.    Past medical, family, surgical and social history reviewed as documented in chart with pertinent positive medical, family, surgical and social history detailed in HPI    Exam Findings:    Const: no acute distress, conversant and alert  Eyes: anicteric, extraocular muscles intact  ENMT: normocephalic, Nl oral membranes  Cardio: no cyanosis, nl cap refill  Pulm: no tachypnea; no resp distress  Abd: soft, no tenderness  Musc: no laceration, no tenderness  Neuro: alert; oriented x 3  Skin: warm, dry; no petichiae  Psych: no anxiety; normal speech    Cath PVR 80 mL       Assessment/Plan:    Assessment & Plan    IMPRESSION:  - Plan to prescribe culture-sensitive antibiotic if results are available before departure, or empiric therapy if results are delayed.    URINARY TRACT INFECTION:  1. Explained importance of using antibiotics only when necessary and based on culture sensitivity to avoid ineffective treatment.  2. Discussed potential for different UTI-causing bacteria to have varying antibiotic sensitivities.  3. Ordered urine culture to rule out infection and guide potential antibiotic selection.  4. Will send antibiotic prescription to  local [facility] if needed during trip.  5. The catheterized specimen was sent for culture     POSTMENOPAUSAL ATROPHIC VAGINITIS:  1. Increased estrogen cream use to daily for 2 weeks, then transitioning to 3 times weekly to address symptoms.       I spent a total of 30 minutes on the day of the visit.  This includes face to face time and non-face to face time preparing to see the patient (eg, review of tests), obtaining and/or reviewing separately obtained history, documenting clinical information in the electronic or other health record, independently interpreting results and communicating results to the patient/family/caregiver, or care coordinator.          Visit complexity today is associated with medical care services that are part of the ongoing care related to the single serious and/or complex condition of Genitourinary syndrome of menopause (GSM). A longitudinal relationship exists or is being developed between the patient and this practitioner for the care of this condition.

## 2025-07-23 ENCOUNTER — TELEPHONE (OUTPATIENT)
Dept: PRIMARY CARE CLINIC | Facility: CLINIC | Age: 70
End: 2025-07-23
Payer: MEDICARE

## 2025-07-23 RX ORDER — SULFAMETHOXAZOLE AND TRIMETHOPRIM 800; 160 MG/1; MG/1
1 TABLET ORAL 2 TIMES DAILY
Qty: 10 TABLET | Refills: 0 | Status: SHIPPED | OUTPATIENT
Start: 2025-07-23 | End: 2025-07-28

## 2025-07-23 NOTE — TELEPHONE ENCOUNTER
Received request for cardiac clearance for Cscope/EGD due to pt being on eliquis. Will generate letter. Please fax to Dr. Simmons' office. Thanks!

## 2025-07-23 NOTE — TELEPHONE ENCOUNTER
Spoke with patient offered 7/30 she stated that she will be out of town , appointment scheduled 8/14 Please fax clearance form [phone]    Judy

## 2025-07-23 NOTE — LETTER
65 [address] [place]  [id number] [facility] [id number]     July 23, 2025    Dear Dr. Simmons,    Ms. Karina Thomas ([birth year]) is medically optimized to undergo her EGD and colonoscopy. Of note, she does have some pulmonary hypertension seen on echocardiogram and centrilobular emphysema seen on imaging due to former smoking history. She is currently on Eliquis for paroxysmal atrial fibrillation. She may hold Eliquis the day prior the procedure and resume once hemostasis is achieved after procedure. Please call if any questions.     Sincerely,         Pinky Reyes MD  Department of Internal Medicine - [facility] 65[facility]

## 2025-07-24 ENCOUNTER — PATIENT MESSAGE (OUTPATIENT)
Dept: UROLOGY | Facility: CLINIC | Age: 70
End: 2025-07-24
Payer: MEDICARE

## 2025-07-24 LAB — BACTERIA UR CULT: ABNORMAL

## 2025-07-28 ENCOUNTER — DOCUMENTATION ONLY (OUTPATIENT)
Dept: PRIMARY CARE CLINIC | Facility: CLINIC | Age: 70
End: 2025-07-28
Payer: MEDICARE

## 2025-08-09 ENCOUNTER — PATIENT MESSAGE (OUTPATIENT)
Dept: UROLOGY | Facility: CLINIC | Age: 70
End: 2025-08-09
Payer: MEDICARE

## 2025-08-11 ENCOUNTER — PATIENT MESSAGE (OUTPATIENT)
Dept: PRIMARY CARE CLINIC | Facility: CLINIC | Age: 70
End: 2025-08-11
Payer: MEDICARE

## 2025-08-14 ENCOUNTER — PATIENT MESSAGE (OUTPATIENT)
Dept: CARDIOLOGY | Facility: CLINIC | Age: 70
End: 2025-08-14

## 2025-08-14 ENCOUNTER — OFFICE VISIT (OUTPATIENT)
Dept: CARDIOLOGY | Facility: CLINIC | Age: 70
End: 2025-08-14
Payer: MEDICARE

## 2025-08-14 VITALS
SYSTOLIC BLOOD PRESSURE: 130 MMHG | HEART RATE: 75 BPM | WEIGHT: 155.44 LBS | DIASTOLIC BLOOD PRESSURE: 72 MMHG | BODY MASS INDEX: 22.95 KG/M2

## 2025-08-14 DIAGNOSIS — Z01.810 PREOP CARDIOVASCULAR EXAM: Primary | ICD-10-CM

## 2025-08-14 DIAGNOSIS — I70.0 AORTIC ATHEROSCLEROSIS: ICD-10-CM

## 2025-08-14 DIAGNOSIS — I10 PRIMARY HYPERTENSION: ICD-10-CM

## 2025-08-14 DIAGNOSIS — I10 HYPERTENSION, UNSPECIFIED TYPE: ICD-10-CM

## 2025-08-14 DIAGNOSIS — Z87.891 FORMER CIGARETTE SMOKER: ICD-10-CM

## 2025-08-14 DIAGNOSIS — E78.5 HYPERLIPIDEMIA, UNSPECIFIED HYPERLIPIDEMIA TYPE: ICD-10-CM

## 2025-08-14 DIAGNOSIS — I48.0 PAROXYSMAL ATRIAL FIBRILLATION: ICD-10-CM

## 2025-08-14 PROCEDURE — 93005 ELECTROCARDIOGRAM TRACING: CPT | Mod: PBBFAC | Performed by: INTERNAL MEDICINE

## 2025-08-14 PROCEDURE — 99999 PR PBB SHADOW E&M-EST. PATIENT-LVL III: CPT | Mod: PBBFAC,,, | Performed by: INTERNAL MEDICINE

## 2025-08-14 PROCEDURE — 99213 OFFICE O/P EST LOW 20 MIN: CPT | Mod: PBBFAC | Performed by: INTERNAL MEDICINE

## 2025-08-14 RX ORDER — AMLODIPINE BESYLATE 10 MG/1
10 TABLET ORAL DAILY
Qty: 90 TABLET | Refills: 3 | Status: SHIPPED | OUTPATIENT
Start: 2025-08-14

## 2025-08-14 RX ORDER — METOPROLOL TARTRATE 50 MG/1
50 TABLET ORAL 2 TIMES DAILY
Qty: 180 TABLET | Refills: 3 | Status: SHIPPED | OUTPATIENT
Start: 2025-08-14

## 2025-08-14 RX ORDER — ROSUVASTATIN CALCIUM 20 MG/1
20 TABLET, COATED ORAL DAILY
Qty: 90 TABLET | Refills: 3 | Status: SHIPPED | OUTPATIENT
Start: 2025-08-14

## 2025-08-15 LAB
OHS QRS DURATION: 88 MS
OHS QTC CALCULATION: 424 MS

## 2025-08-20 ENCOUNTER — PATIENT MESSAGE (OUTPATIENT)
Dept: UROLOGY | Facility: CLINIC | Age: 70
End: 2025-08-20
Payer: MEDICARE

## 2025-08-28 ENCOUNTER — OFFICE VISIT (OUTPATIENT)
Dept: UROLOGY | Facility: CLINIC | Age: 70
End: 2025-08-28
Payer: MEDICARE

## 2025-08-28 VITALS
BODY MASS INDEX: 23.11 KG/M2 | DIASTOLIC BLOOD PRESSURE: 68 MMHG | SYSTOLIC BLOOD PRESSURE: 115 MMHG | WEIGHT: 156.5 LBS | HEART RATE: 82 BPM

## 2025-08-28 DIAGNOSIS — N95.8 GENITOURINARY SYNDROME OF MENOPAUSE: Primary | ICD-10-CM

## 2025-08-28 PROCEDURE — 99999 PR PBB SHADOW E&M-EST. PATIENT-LVL III: CPT | Mod: PBBFAC,,,

## 2025-08-28 PROCEDURE — 99214 OFFICE O/P EST MOD 30 MIN: CPT | Mod: S$PBB,,,

## 2025-08-28 PROCEDURE — 99213 OFFICE O/P EST LOW 20 MIN: CPT | Mod: PBBFAC
